# Patient Record
Sex: MALE | Race: WHITE | Employment: OTHER | URBAN - METROPOLITAN AREA
[De-identification: names, ages, dates, MRNs, and addresses within clinical notes are randomized per-mention and may not be internally consistent; named-entity substitution may affect disease eponyms.]

---

## 2017-10-22 ENCOUNTER — HOSPITAL ENCOUNTER (EMERGENCY)
Age: 62
Discharge: HOME | End: 2017-10-22
Attending: EMERGENCY MEDICINE
Payer: MEDICARE

## 2017-10-22 ENCOUNTER — APPOINTMENT (OUTPATIENT)
Dept: GENERAL RADIOLOGY | Age: 62
End: 2017-10-22
Payer: MEDICARE

## 2017-10-22 VITALS
WEIGHT: 250 LBS | TEMPERATURE: 98.1 F | SYSTOLIC BLOOD PRESSURE: 142 MMHG | DIASTOLIC BLOOD PRESSURE: 63 MMHG | BODY MASS INDEX: 35.79 KG/M2 | OXYGEN SATURATION: 95 % | HEIGHT: 70 IN | HEART RATE: 75 BPM | RESPIRATION RATE: 18 BRPM

## 2017-10-22 DIAGNOSIS — L02.511 ABSCESS OF RIGHT INDEX FINGER: Primary | ICD-10-CM

## 2017-10-22 LAB
ANION GAP SERPL CALCULATED.3IONS-SCNC: 19 MEQ/L (ref 7–13)
BASOPHILS ABSOLUTE: 0.3 K/UL (ref 0–0.2)
BASOPHILS RELATIVE PERCENT: 1.2 %
BUN BLDV-MCNC: 19 MG/DL (ref 8–23)
CALCIUM SERPL-MCNC: 9.3 MG/DL (ref 8.6–10.2)
CHLORIDE BLD-SCNC: 98 MEQ/L (ref 98–107)
CO2: 21 MEQ/L (ref 22–29)
CREAT SERPL-MCNC: 1.09 MG/DL (ref 0.7–1.2)
EOSINOPHILS ABSOLUTE: 1.3 K/UL (ref 0–0.7)
EOSINOPHILS RELATIVE PERCENT: 5.7 %
GFR AFRICAN AMERICAN: >60
GFR NON-AFRICAN AMERICAN: >60
GLUCOSE BLD-MCNC: 148 MG/DL (ref 74–109)
HCT VFR BLD CALC: 41.7 % (ref 42–52)
HEMOGLOBIN: 13.6 G/DL (ref 14–18)
LACTIC ACID: 1.5 MMOL/L (ref 0.5–2.2)
LYMPHOCYTES ABSOLUTE: 6.5 K/UL (ref 1–4.8)
LYMPHOCYTES RELATIVE PERCENT: 28.7 %
MCH RBC QN AUTO: 28.4 PG (ref 27–31.3)
MCHC RBC AUTO-ENTMCNC: 32.6 % (ref 33–37)
MCV RBC AUTO: 87.2 FL (ref 80–100)
MONOCYTES ABSOLUTE: 2.6 K/UL (ref 0.2–0.8)
MONOCYTES RELATIVE PERCENT: 11.4 %
NEUTROPHILS ABSOLUTE: 12.1 K/UL (ref 1.4–6.5)
NEUTROPHILS RELATIVE PERCENT: 53 %
PDW BLD-RTO: 14 % (ref 11.5–14.5)
PLATELET # BLD: 274 K/UL (ref 130–400)
POTASSIUM SERPL-SCNC: 4.5 MEQ/L (ref 3.5–5.1)
RBC # BLD: 4.78 M/UL (ref 4.7–6.1)
SODIUM BLD-SCNC: 138 MEQ/L (ref 132–144)
WBC # BLD: 22.8 K/UL (ref 4.8–10.8)

## 2017-10-22 PROCEDURE — 85025 COMPLETE CBC W/AUTO DIFF WBC: CPT

## 2017-10-22 PROCEDURE — 96366 THER/PROPH/DIAG IV INF ADDON: CPT

## 2017-10-22 PROCEDURE — 87070 CULTURE OTHR SPECIMN AEROBIC: CPT

## 2017-10-22 PROCEDURE — 80048 BASIC METABOLIC PNL TOTAL CA: CPT

## 2017-10-22 PROCEDURE — 87077 CULTURE AEROBIC IDENTIFY: CPT

## 2017-10-22 PROCEDURE — 99283 EMERGENCY DEPT VISIT LOW MDM: CPT

## 2017-10-22 PROCEDURE — 87075 CULTR BACTERIA EXCEPT BLOOD: CPT

## 2017-10-22 PROCEDURE — 6360000002 HC RX W HCPCS: Performed by: EMERGENCY MEDICINE

## 2017-10-22 PROCEDURE — 96375 TX/PRO/DX INJ NEW DRUG ADDON: CPT

## 2017-10-22 PROCEDURE — 87186 SC STD MICRODIL/AGAR DIL: CPT

## 2017-10-22 PROCEDURE — 83605 ASSAY OF LACTIC ACID: CPT

## 2017-10-22 PROCEDURE — 87205 SMEAR GRAM STAIN: CPT

## 2017-10-22 PROCEDURE — 87147 CULTURE TYPE IMMUNOLOGIC: CPT

## 2017-10-22 PROCEDURE — 96365 THER/PROPH/DIAG IV INF INIT: CPT

## 2017-10-22 PROCEDURE — 2580000003 HC RX 258: Performed by: EMERGENCY MEDICINE

## 2017-10-22 PROCEDURE — 73140 X-RAY EXAM OF FINGER(S): CPT

## 2017-10-22 PROCEDURE — 87040 BLOOD CULTURE FOR BACTERIA: CPT

## 2017-10-22 RX ORDER — OXYCODONE HYDROCHLORIDE AND ACETAMINOPHEN 5; 325 MG/1; MG/1
1-2 TABLET ORAL EVERY 6 HOURS PRN
Qty: 20 TABLET | Refills: 0 | Status: SHIPPED | OUTPATIENT
Start: 2017-10-22 | End: 2017-10-29

## 2017-10-22 RX ORDER — CLINDAMYCIN HYDROCHLORIDE 150 MG/1
450 CAPSULE ORAL 3 TIMES DAILY
Qty: 90 CAPSULE | Refills: 0 | Status: SHIPPED | OUTPATIENT
Start: 2017-10-22 | End: 2017-11-01

## 2017-10-22 RX ORDER — 0.9 % SODIUM CHLORIDE 0.9 %
1000 INTRAVENOUS SOLUTION INTRAVENOUS ONCE
Status: COMPLETED | OUTPATIENT
Start: 2017-10-22 | End: 2017-10-22

## 2017-10-22 RX ORDER — CHLORHEXIDINE GLUCONATE 4 G/100ML
SOLUTION TOPICAL
Qty: 236 ML | Refills: 0 | Status: SHIPPED | OUTPATIENT
Start: 2017-10-22 | End: 2017-11-05

## 2017-10-22 RX ADMIN — SODIUM CHLORIDE 1000 ML: 9 INJECTION, SOLUTION INTRAVENOUS at 18:29

## 2017-10-22 RX ADMIN — VANCOMYCIN HYDROCHLORIDE 1000 MG: 1 INJECTION, POWDER, LYOPHILIZED, FOR SOLUTION INTRAVENOUS at 18:53

## 2017-10-22 RX ADMIN — HYDROMORPHONE HYDROCHLORIDE 1 MG: 1 INJECTION, SOLUTION INTRAMUSCULAR; INTRAVENOUS; SUBCUTANEOUS at 18:29

## 2017-10-22 ASSESSMENT — PAIN SCALES - GENERAL
PAINLEVEL_OUTOF10: 7
PAINLEVEL_OUTOF10: 0
PAINLEVEL_OUTOF10: 7
PAINLEVEL_OUTOF10: 0

## 2017-10-22 ASSESSMENT — ENCOUNTER SYMPTOMS
EYE PAIN: 0
WHEEZING: 0
BACK PAIN: 0
COUGH: 0
SHORTNESS OF BREATH: 0
VOMITING: 0
DIARRHEA: 0
ABDOMINAL PAIN: 0
CONSTIPATION: 0
APNEA: 0
ABDOMINAL DISTENTION: 0
SINUS PRESSURE: 0
PHOTOPHOBIA: 0
SORE THROAT: 0
COLOR CHANGE: 0
NAUSEA: 0
RHINORRHEA: 0

## 2017-10-22 ASSESSMENT — PAIN DESCRIPTION - ONSET: ONSET: GRADUAL

## 2017-10-22 ASSESSMENT — PAIN DESCRIPTION - PROGRESSION: CLINICAL_PROGRESSION: GRADUALLY WORSENING

## 2017-10-22 ASSESSMENT — PAIN DESCRIPTION - PAIN TYPE: TYPE: ACUTE PAIN

## 2017-10-22 ASSESSMENT — PAIN DESCRIPTION - LOCATION: LOCATION: FINGER (COMMENT WHICH ONE)

## 2017-10-22 ASSESSMENT — PAIN DESCRIPTION - FREQUENCY: FREQUENCY: CONTINUOUS

## 2017-10-22 ASSESSMENT — PAIN DESCRIPTION - DESCRIPTORS: DESCRIPTORS: DULL;ACHING

## 2017-10-22 ASSESSMENT — PAIN DESCRIPTION - ORIENTATION: ORIENTATION: RIGHT;OTHER (COMMENT)

## 2017-10-22 NOTE — ED TRIAGE NOTES
Patient to room #8 for c/o abscess on right hand first finger. Abscess is draining at this time. Dr. Ethel Aranda at bedside.

## 2017-10-22 NOTE — ED PROVIDER NOTES
[address] [place] [address] ED  eMERGENCY dEPARTMENT eNCOUnter      Pt Name: Mike Byrd  MRN: [id number]  [place] 1955  Date of evaluation: 10/22/2017  Provider: Keena Page MD    [address] [place] [address]       Chief Complaint   Patient presents with    Abscess     right first finger       HISTORY OF PRESENT ILLNESS   (Location/Symptom, Timing/Onset, Context/Setting, Quality, Duration, Modifying Factors, Severity)  Note limiting factors. Mike Byrd is a 63 y.o. male who presents to the emergency department with complaint of Right index finger abscess. History of diabetes on insulin. Was seen by family doctor Dr. Gladys Dunlap and started him on Bactrim last week. Abscess has however continued to worsen. Draining purulence. Also worsening pain and swelling. Denies fever or chills. Denies nausea or vomiting. HPI    Nursing Notes were reviewed. REVIEW OF SYSTEMS    (2-9 systems for level 4, 10 or more for level 5)     Review of Systems   Constitutional: Negative. Negative for activity change, appetite change, chills, fatigue and fever. HENT: Negative for congestion, ear discharge, ear pain, hearing loss, rhinorrhea, sinus pressure and sore throat. Eyes: Negative for photophobia, pain and visual disturbance. Respiratory: Negative for apnea, cough, shortness of breath and wheezing. Cardiovascular: Negative for chest pain, palpitations and leg swelling. Gastrointestinal: Negative for abdominal distention, abdominal pain, constipation, diarrhea, nausea and vomiting. Endocrine: Negative for cold intolerance, heat intolerance and polyuria. Genitourinary: Negative for dysuria, flank pain, frequency and urgency. Musculoskeletal: Positive for joint swelling. Negative for arthralgias, back pain, gait problem, myalgias and neck stiffness. Skin: Negative for color change, pallor and rash. Allergic/Immunologic: Negative for food allergies and immunocompromised state.    Neurological: Negative for dizziness, tremors, syncope, weakness, light-headedness and headaches. Psychiatric/Behavioral: Negative for agitation, confusion and hallucinations. All other systems reviewed and are negative. Except as noted above the remainder of the review of systems was reviewed and negative. PAST MEDICAL HISTORY     Past Medical History:   Diagnosis Date    [facility]     Depression     Hodgkin disease [facility]     Hyperlipidemia     Hypertension     MRSA infection 9/2014    Left Groin     Sleep apnea     [facility]     Type II or unspecified type diabetes mellitus without mention of complication, not stated as uncontrolled          SURGICAL HISTORY       Past Surgical History:   Procedure Laterality Date    ABDOMEN SURGERY      hodgkin     CHOLECYSTECTOMY      COLONOSCOPY      COLONOSCOPY N/A 11/22/2016    COLONOSCOPY performed by Cheryl Holt MD at [facility]      umbilical    SPLENECTOMY, TOTAL     [address] [place]       Discharge Medication List as of 10/22/2017  7:01 PM      CONTINUE these medications which have NOT CHANGED    Details   aspirin 81 MG EC tablet Take 81 mg by mouth daily      insulin NPH (HUMULIN N;NOVOLIN N) 100 UNIT/ML injection vial Inject into the skin 2 times daily (before meals)      Dulaglutide (TRULICITY) 9.09 CX/6.4MA SOPN Inject into the skin      clonazePAM (KLONOPIN) 1 MG tablet Take 1 mg by mouth 2 times daily as needed      !! metoprolol (TOPROL-XL) 25 MG XL tablet Historical Med      Ipratropium-Albuterol (COMBIVENT RESPIMAT IN) Inhale  into the lungs. levothyroxine (SYNTHROID) 175 MCG tablet Take 175 mcg by mouth Daily. atorvastatin (LIPITOR) 40 MG tablet Take 40 mg by mouth daily. buPROPion SR (WELLBUTRIN SR) 150 MG SR tablet Take 150 mg by mouth 2 times daily.

## 2017-10-25 LAB
ANAEROBIC CULTURE: ABNORMAL
GRAM STAIN RESULT: ABNORMAL
ORGANISM: ABNORMAL
WOUND/ABSCESS: ABNORMAL

## 2017-10-27 LAB
BLOOD CULTURE, ROUTINE: NORMAL
CULTURE, BLOOD 2: NORMAL

## 2017-11-01 ENCOUNTER — TELEPHONE (OUTPATIENT)
Dept: INFECTIOUS DISEASES | Age: 62
End: 2017-11-01

## 2017-11-01 NOTE — TELEPHONE ENCOUNTER
Lab results of Vanco trough rcvd, Result is 8.4. Per review with Dr Coral Ring, Patient to stay on same dose. Fax Rcvd from [facility] and [facility]. Per [facility], Patient is on 105 IV Vanco Q 24 hours.

## 2017-11-08 ENCOUNTER — TELEPHONE (OUTPATIENT)
Dept: INFECTIOUS DISEASES | Age: 62
End: 2017-11-08

## 2017-11-17 ENCOUNTER — OFFICE VISIT (OUTPATIENT)
Dept: INFECTIOUS DISEASES | Age: 62
End: 2017-11-17

## 2017-11-17 VITALS
TEMPERATURE: 98.6 F | WEIGHT: 269 LBS | BODY MASS INDEX: 38.51 KG/M2 | HEART RATE: 95 BPM | RESPIRATION RATE: 20 BRPM | DIASTOLIC BLOOD PRESSURE: 68 MMHG | SYSTOLIC BLOOD PRESSURE: 124 MMHG | HEIGHT: 70 IN

## 2017-11-17 DIAGNOSIS — Z90.81 H/O SPLENECTOMY: ICD-10-CM

## 2017-11-17 DIAGNOSIS — D72.823 LEUKEMOID REACTION: ICD-10-CM

## 2017-11-17 DIAGNOSIS — L03.90 MRSA CELLULITIS: ICD-10-CM

## 2017-11-17 DIAGNOSIS — L03.011 CELLULITIS OF FINGER OF RIGHT HAND: Primary | ICD-10-CM

## 2017-11-17 DIAGNOSIS — B95.62 MRSA CELLULITIS: ICD-10-CM

## 2017-11-17 PROCEDURE — G8484 FLU IMMUNIZE NO ADMIN: HCPCS | Performed by: INTERNAL MEDICINE

## 2017-11-17 PROCEDURE — 3017F COLORECTAL CA SCREEN DOC REV: CPT | Performed by: INTERNAL MEDICINE

## 2017-11-17 PROCEDURE — G8427 DOCREV CUR MEDS BY ELIG CLIN: HCPCS | Performed by: INTERNAL MEDICINE

## 2017-11-17 PROCEDURE — 1036F TOBACCO NON-USER: CPT | Performed by: INTERNAL MEDICINE

## 2017-11-17 PROCEDURE — G8417 CALC BMI ABV UP PARAM F/U: HCPCS | Performed by: INTERNAL MEDICINE

## 2017-11-17 PROCEDURE — 99214 OFFICE O/P EST MOD 30 MIN: CPT | Performed by: INTERNAL MEDICINE

## 2017-11-17 RX ORDER — DOXYCYCLINE HYCLATE 100 MG
100 TABLET ORAL 2 TIMES DAILY
Qty: 14 TABLET | Refills: 0 | Status: SHIPPED | OUTPATIENT
Start: 2017-11-17 | End: 2017-11-24

## 2017-11-17 NOTE — PROGRESS NOTES
Infectious Disease Progress Note    11/17/2017    Patient is a followup regarding R finger infection with cellulitis - not currently on any abx. Of note he does not have a spleen. ( hodgkins ) and picc is out. Still having some erythema and swelling of the finger. Has concerns about this. No fevers. Appears to be doing ok. Subjectively, no new complaints at this time. General: Patient in no acute distress, cooperative  Skin: no new rashes   HEENT:  EOMI, MMM, Neck is supple  Heart: S1 S2  Lungs: clear bilaterally   Abdomen: soft, ND, +BS, NTTP  Extrem:+pulses, no calf pain, right finger with slight warmth and swelling, mild erythema. Neuro exam: CN II-XII intact    Imaging and labs were reviewed per medical records and any ID pertinent labs were addressed with the patient. Lab Results   Component Value Date    WBC 22.8 (H) 10/22/2017    HGB 13.6 (L) 10/22/2017    HCT 41.7 (L) 10/22/2017    MCV 87.2 10/22/2017     10/22/2017         ASSESSMENT  R index finger cellulitis - subtherapeutic vanco levels and still some leukocytosis  Hx of MRSA  Hx of DM2  Hx of splenectomy  leukomoid reaction    PLAN:    S/p Vanco ( dc'ed 11/11 ) x 2 weeks. Doxy PO x 7 days      Follow up as needed. Time spent with patient: 25 min    >50% of time spent counseling on: Wound must be kept clean. Patient must wash hands prior to and after touching the wound area. Patient has been discouraged from picking at the skin or scratching.         Jen Quintero DO

## 2018-02-02 ENCOUNTER — APPOINTMENT (OUTPATIENT)
Dept: GENERAL RADIOLOGY | Age: 63
DRG: 193 | End: 2018-02-02
Payer: MEDICARE

## 2018-02-02 ENCOUNTER — HOSPITAL ENCOUNTER (INPATIENT)
Age: 63
LOS: 3 days | Discharge: HOME | DRG: 193 | End: 2018-02-05
Attending: EMERGENCY MEDICINE | Admitting: INTERNAL MEDICINE
Payer: MEDICARE

## 2018-02-02 DIAGNOSIS — E87.1 HYPONATREMIA: ICD-10-CM

## 2018-02-02 DIAGNOSIS — R73.9 HYPERGLYCEMIA: ICD-10-CM

## 2018-02-02 DIAGNOSIS — E86.0 DEHYDRATION: ICD-10-CM

## 2018-02-02 DIAGNOSIS — J18.9 PNEUMONIA DUE TO ORGANISM: Primary | ICD-10-CM

## 2018-02-02 LAB
ALBUMIN SERPL-MCNC: 3.8 G/DL (ref 3.9–4.9)
ALP BLD-CCNC: 146 U/L (ref 35–104)
ALT SERPL-CCNC: 30 U/L (ref 0–41)
ANION GAP SERPL CALCULATED.3IONS-SCNC: 18 MEQ/L (ref 7–13)
ANION GAP SERPL CALCULATED.3IONS-SCNC: 19 MEQ/L (ref 7–13)
ANION GAP SERPL CALCULATED.3IONS-SCNC: 20 MEQ/L (ref 7–13)
AST SERPL-CCNC: 21 U/L (ref 0–40)
BANDED NEUTROPHILS RELATIVE PERCENT: 7 %
BASOPHILS ABSOLUTE: 0 K/UL (ref 0–0.2)
BASOPHILS RELATIVE PERCENT: 0.2 %
BILIRUB SERPL-MCNC: 1.4 MG/DL (ref 0–1.2)
BILIRUBIN URINE: NEGATIVE
BLOOD, URINE: NEGATIVE
BUN BLDV-MCNC: 21 MG/DL (ref 8–23)
BUN BLDV-MCNC: 28 MG/DL (ref 8–23)
BUN BLDV-MCNC: 30 MG/DL (ref 8–23)
CALCIUM SERPL-MCNC: 8.8 MG/DL (ref 8.6–10.2)
CALCIUM SERPL-MCNC: 8.9 MG/DL (ref 8.6–10.2)
CALCIUM SERPL-MCNC: 9.1 MG/DL (ref 8.6–10.2)
CHLORIDE BLD-SCNC: 89 MEQ/L (ref 98–107)
CHLORIDE BLD-SCNC: 90 MEQ/L (ref 98–107)
CHLORIDE BLD-SCNC: 91 MEQ/L (ref 98–107)
CLARITY: CLEAR
CO2: 17 MEQ/L (ref 22–29)
CO2: 18 MEQ/L (ref 22–29)
CO2: 20 MEQ/L (ref 22–29)
COLOR: YELLOW
CREAT SERPL-MCNC: 1.28 MG/DL (ref 0.7–1.2)
CREAT SERPL-MCNC: 1.33 MG/DL (ref 0.7–1.2)
CREAT SERPL-MCNC: 1.37 MG/DL (ref 0.7–1.2)
EKG ATRIAL RATE: 95 BPM
EKG P AXIS: 51 DEGREES
EKG P-R INTERVAL: 198 MS
EKG Q-T INTERVAL: 354 MS
EKG QRS DURATION: 100 MS
EKG QTC CALCULATION (BAZETT): 444 MS
EKG R AXIS: 1 DEGREES
EKG T AXIS: 31 DEGREES
EKG VENTRICULAR RATE: 95 BPM
EOSINOPHILS ABSOLUTE: 0.3 K/UL (ref 0–0.7)
EOSINOPHILS RELATIVE PERCENT: 1 %
GFR AFRICAN AMERICAN: >60
GFR NON-AFRICAN AMERICAN: 52.8
GFR NON-AFRICAN AMERICAN: 54.7
GFR NON-AFRICAN AMERICAN: 57.2
GLOBULIN: 3.4 G/DL (ref 2.3–3.5)
GLUCOSE BLD-MCNC: 285 MG/DL (ref 60–115)
GLUCOSE BLD-MCNC: 290 MG/DL (ref 60–115)
GLUCOSE BLD-MCNC: 312 MG/DL (ref 74–109)
GLUCOSE BLD-MCNC: 338 MG/DL (ref 60–115)
GLUCOSE BLD-MCNC: 346 MG/DL (ref 74–109)
GLUCOSE BLD-MCNC: 451 MG/DL (ref 60–115)
GLUCOSE BLD-MCNC: 454 MG/DL (ref 60–115)
GLUCOSE BLD-MCNC: 465 MG/DL (ref 60–115)
GLUCOSE BLD-MCNC: 471 MG/DL (ref 60–115)
GLUCOSE BLD-MCNC: 499 MG/DL (ref 60–115)
GLUCOSE BLD-MCNC: 501 MG/DL (ref 74–109)
GLUCOSE BLD-MCNC: 523 MG/DL (ref 60–115)
GLUCOSE URINE: 500 MG/DL
HBA1C MFR BLD: 10.7 % (ref 4.8–5.9)
HCT VFR BLD CALC: 42.4 % (ref 42–52)
HEMOGLOBIN: 14.3 G/DL (ref 14–18)
KETONES, URINE: ABNORMAL MG/DL
LACTIC ACID: 1.8 MMOL/L (ref 0.5–2.2)
LEUKOCYTE ESTERASE, URINE: NEGATIVE
LIPASE: 23 U/L (ref 13–60)
LYMPHOCYTES ABSOLUTE: 5.9 K/UL (ref 1–4.8)
LYMPHOCYTES RELATIVE PERCENT: 20 %
MAGNESIUM: 2.1 MG/DL (ref 1.7–2.3)
MCH RBC QN AUTO: 28.7 PG (ref 27–31.3)
MCHC RBC AUTO-ENTMCNC: 33.7 % (ref 33–37)
MCV RBC AUTO: 85.2 FL (ref 80–100)
MONOCYTES ABSOLUTE: 1.5 K/UL (ref 0.2–0.8)
MONOCYTES RELATIVE PERCENT: 5 %
NEUTROPHILS ABSOLUTE: 21.8 K/UL (ref 1.4–6.5)
NEUTROPHILS RELATIVE PERCENT: 67 %
NITRITE, URINE: NEGATIVE
PDW BLD-RTO: 14.1 % (ref 11.5–14.5)
PERFORMED ON: ABNORMAL
PH UA: 5 (ref 5–9)
PLATELET # BLD: 264 K/UL (ref 130–400)
POTASSIUM SERPL-SCNC: 4.2 MEQ/L (ref 3.5–5.1)
POTASSIUM SERPL-SCNC: 4.5 MEQ/L (ref 3.5–5.1)
POTASSIUM SERPL-SCNC: 4.8 MEQ/L (ref 3.5–5.1)
PRO-BNP: 114 PG/ML
PROTEIN UA: NEGATIVE MG/DL
RAPID INFLUENZA  B AGN: NEGATIVE
RAPID INFLUENZA A AGN: NEGATIVE
RBC # BLD: 4.98 M/UL (ref 4.7–6.1)
SODIUM BLD-SCNC: 125 MEQ/L (ref 132–144)
SODIUM BLD-SCNC: 128 MEQ/L (ref 132–144)
SODIUM BLD-SCNC: 129 MEQ/L (ref 132–144)
SPECIFIC GRAVITY UA: 1.01 (ref 1–1.03)
TOTAL PROTEIN: 7.2 G/DL (ref 6.4–8.1)
URINE REFLEX TO CULTURE: ABNORMAL
UROBILINOGEN, URINE: 0.2 E.U./DL
WBC # BLD: 29.5 K/UL (ref 4.8–10.8)

## 2018-02-02 PROCEDURE — 97162 PT EVAL MOD COMPLEX 30 MIN: CPT

## 2018-02-02 PROCEDURE — 2700000000 HC OXYGEN THERAPY PER DAY

## 2018-02-02 PROCEDURE — 93005 ELECTROCARDIOGRAM TRACING: CPT

## 2018-02-02 PROCEDURE — 2580000003 HC RX 258: Performed by: INTERNAL MEDICINE

## 2018-02-02 PROCEDURE — 6370000000 HC RX 637 (ALT 250 FOR IP): Performed by: EMERGENCY MEDICINE

## 2018-02-02 PROCEDURE — 1210000000 HC MED SURG R&B

## 2018-02-02 PROCEDURE — 36415 COLL VENOUS BLD VENIPUNCTURE: CPT

## 2018-02-02 PROCEDURE — G8979 MOBILITY GOAL STATUS: HCPCS

## 2018-02-02 PROCEDURE — 96365 THER/PROPH/DIAG IV INF INIT: CPT

## 2018-02-02 PROCEDURE — 2500000003 HC RX 250 WO HCPCS: Performed by: INTERNAL MEDICINE

## 2018-02-02 PROCEDURE — 2580000003 HC RX 258: Performed by: EMERGENCY MEDICINE

## 2018-02-02 PROCEDURE — 83690 ASSAY OF LIPASE: CPT

## 2018-02-02 PROCEDURE — 87449 NOS EACH ORGANISM AG IA: CPT

## 2018-02-02 PROCEDURE — 6370000000 HC RX 637 (ALT 250 FOR IP): Performed by: INTERNAL MEDICINE

## 2018-02-02 PROCEDURE — 99285 EMERGENCY DEPT VISIT HI MDM: CPT

## 2018-02-02 PROCEDURE — 83880 ASSAY OF NATRIURETIC PEPTIDE: CPT

## 2018-02-02 PROCEDURE — 86403 PARTICLE AGGLUT ANTBDY SCRN: CPT

## 2018-02-02 PROCEDURE — 6360000002 HC RX W HCPCS: Performed by: EMERGENCY MEDICINE

## 2018-02-02 PROCEDURE — 81003 URINALYSIS AUTO W/O SCOPE: CPT

## 2018-02-02 PROCEDURE — 80053 COMPREHEN METABOLIC PANEL: CPT

## 2018-02-02 PROCEDURE — 97166 OT EVAL MOD COMPLEX 45 MIN: CPT

## 2018-02-02 PROCEDURE — 83605 ASSAY OF LACTIC ACID: CPT

## 2018-02-02 PROCEDURE — 96375 TX/PRO/DX INJ NEW DRUG ADDON: CPT

## 2018-02-02 PROCEDURE — 94761 N-INVAS EAR/PLS OXIMETRY MLT: CPT

## 2018-02-02 PROCEDURE — 94640 AIRWAY INHALATION TREATMENT: CPT

## 2018-02-02 PROCEDURE — 83735 ASSAY OF MAGNESIUM: CPT

## 2018-02-02 PROCEDURE — 85025 COMPLETE CBC W/AUTO DIFF WBC: CPT

## 2018-02-02 PROCEDURE — 71046 X-RAY EXAM CHEST 2 VIEWS: CPT

## 2018-02-02 PROCEDURE — G8994 SUB PT/OT GOAL STATUS: HCPCS

## 2018-02-02 PROCEDURE — G8993 SUB PT/OT CURRENT STATUS: HCPCS

## 2018-02-02 PROCEDURE — 83036 HEMOGLOBIN GLYCOSYLATED A1C: CPT

## 2018-02-02 PROCEDURE — G8978 MOBILITY CURRENT STATUS: HCPCS

## 2018-02-02 PROCEDURE — 87040 BLOOD CULTURE FOR BACTERIA: CPT

## 2018-02-02 PROCEDURE — 6360000002 HC RX W HCPCS: Performed by: INTERNAL MEDICINE

## 2018-02-02 RX ORDER — LEVOFLOXACIN 5 MG/ML
750 INJECTION, SOLUTION INTRAVENOUS EVERY 24 HOURS
Status: DISCONTINUED | OUTPATIENT
Start: 2018-02-02 | End: 2018-02-05 | Stop reason: HOSPADM

## 2018-02-02 RX ORDER — IPRATROPIUM BROMIDE AND ALBUTEROL SULFATE 2.5; .5 MG/3ML; MG/3ML
1 SOLUTION RESPIRATORY (INHALATION)
Status: DISCONTINUED | OUTPATIENT
Start: 2018-02-02 | End: 2018-02-05 | Stop reason: HOSPADM

## 2018-02-02 RX ORDER — NICOTINE POLACRILEX 4 MG
15 LOZENGE BUCCAL PRN
Status: DISCONTINUED | OUTPATIENT
Start: 2018-02-02 | End: 2018-02-02 | Stop reason: SDUPTHER

## 2018-02-02 RX ORDER — METHYLPREDNISOLONE SODIUM SUCCINATE 125 MG/2ML
125 INJECTION, POWDER, LYOPHILIZED, FOR SOLUTION INTRAMUSCULAR; INTRAVENOUS ONCE
Status: COMPLETED | OUTPATIENT
Start: 2018-02-02 | End: 2018-02-02

## 2018-02-02 RX ORDER — LEVOFLOXACIN 5 MG/ML
750 INJECTION, SOLUTION INTRAVENOUS ONCE
Status: COMPLETED | OUTPATIENT
Start: 2018-02-02 | End: 2018-02-02

## 2018-02-02 RX ORDER — PHENTERMINE HYDROCHLORIDE 37.5 MG/1
37.5 TABLET ORAL
Status: DISCONTINUED | OUTPATIENT
Start: 2018-02-03 | End: 2018-02-02

## 2018-02-02 RX ORDER — SODIUM CHLORIDE 0.9 % (FLUSH) 0.9 %
10 SYRINGE (ML) INJECTION PRN
Status: DISCONTINUED | OUTPATIENT
Start: 2018-02-02 | End: 2018-02-05 | Stop reason: HOSPADM

## 2018-02-02 RX ORDER — DEXTROSE AND SODIUM CHLORIDE 5; .9 G/100ML; G/100ML
INJECTION, SOLUTION INTRAVENOUS PRN
Status: DISCONTINUED | OUTPATIENT
Start: 2018-02-02 | End: 2018-02-05 | Stop reason: HOSPADM

## 2018-02-02 RX ORDER — LISINOPRIL 10 MG/1
10 TABLET ORAL DAILY
Status: DISCONTINUED | OUTPATIENT
Start: 2018-02-02 | End: 2018-02-02

## 2018-02-02 RX ORDER — AMLODIPINE BESYLATE AND ATORVASTATIN CALCIUM 10; 20 MG/1; MG/1
1 TABLET, FILM COATED ORAL DAILY
Status: DISCONTINUED | OUTPATIENT
Start: 2018-02-02 | End: 2018-02-02

## 2018-02-02 RX ORDER — 0.9 % SODIUM CHLORIDE 0.9 %
1000 INTRAVENOUS SOLUTION INTRAVENOUS ONCE
Status: COMPLETED | OUTPATIENT
Start: 2018-02-02 | End: 2018-02-02

## 2018-02-02 RX ORDER — AMLODIPINE BESYLATE 10 MG/1
10 TABLET ORAL DAILY
Status: DISCONTINUED | OUTPATIENT
Start: 2018-02-02 | End: 2018-02-02

## 2018-02-02 RX ORDER — BISACODYL 10 MG
10 SUPPOSITORY, RECTAL RECTAL DAILY PRN
Status: DISCONTINUED | OUTPATIENT
Start: 2018-02-02 | End: 2018-02-05 | Stop reason: HOSPADM

## 2018-02-02 RX ORDER — BUPROPION HYDROCHLORIDE 150 MG/1
150 TABLET, EXTENDED RELEASE ORAL 2 TIMES DAILY
Status: DISCONTINUED | OUTPATIENT
Start: 2018-02-02 | End: 2018-02-05 | Stop reason: HOSPADM

## 2018-02-02 RX ORDER — INSULIN GLARGINE 100 [IU]/ML
30 INJECTION, SOLUTION SUBCUTANEOUS ONCE
Status: COMPLETED | OUTPATIENT
Start: 2018-02-02 | End: 2018-02-02

## 2018-02-02 RX ORDER — CLONAZEPAM 0.5 MG/1
1 TABLET ORAL 2 TIMES DAILY PRN
Status: DISCONTINUED | OUTPATIENT
Start: 2018-02-02 | End: 2018-02-05 | Stop reason: HOSPADM

## 2018-02-02 RX ORDER — IPRATROPIUM BROMIDE AND ALBUTEROL SULFATE 2.5; .5 MG/3ML; MG/3ML
1 SOLUTION RESPIRATORY (INHALATION)
Status: DISCONTINUED | OUTPATIENT
Start: 2018-02-02 | End: 2018-02-02

## 2018-02-02 RX ORDER — IPRATROPIUM BROMIDE AND ALBUTEROL SULFATE 2.5; .5 MG/3ML; MG/3ML
1 SOLUTION RESPIRATORY (INHALATION) ONCE
Status: COMPLETED | OUTPATIENT
Start: 2018-02-02 | End: 2018-02-02

## 2018-02-02 RX ORDER — DEXTROSE MONOHYDRATE 25 G/50ML
12.5 INJECTION, SOLUTION INTRAVENOUS PRN
Status: DISCONTINUED | OUTPATIENT
Start: 2018-02-02 | End: 2018-02-02 | Stop reason: SDUPTHER

## 2018-02-02 RX ORDER — ATORVASTATIN CALCIUM 10 MG/1
20 TABLET, FILM COATED ORAL DAILY
Status: DISCONTINUED | OUTPATIENT
Start: 2018-02-02 | End: 2018-02-05 | Stop reason: HOSPADM

## 2018-02-02 RX ORDER — SODIUM CHLORIDE 9 MG/ML
INJECTION, SOLUTION INTRAVENOUS
Status: DISCONTINUED
Start: 2018-02-02 | End: 2018-02-03

## 2018-02-02 RX ORDER — METHYLPREDNISOLONE SODIUM SUCCINATE 40 MG/ML
40 INJECTION, POWDER, LYOPHILIZED, FOR SOLUTION INTRAMUSCULAR; INTRAVENOUS EVERY 8 HOURS
Status: DISCONTINUED | OUTPATIENT
Start: 2018-02-02 | End: 2018-02-03

## 2018-02-02 RX ORDER — ACETAMINOPHEN 325 MG/1
650 TABLET ORAL EVERY 4 HOURS PRN
Status: DISCONTINUED | OUTPATIENT
Start: 2018-02-02 | End: 2018-02-05 | Stop reason: HOSPADM

## 2018-02-02 RX ORDER — ASPIRIN 81 MG/1
81 TABLET ORAL DAILY
Status: DISCONTINUED | OUTPATIENT
Start: 2018-02-02 | End: 2018-02-05 | Stop reason: HOSPADM

## 2018-02-02 RX ORDER — DOCUSATE SODIUM 100 MG/1
100 CAPSULE, LIQUID FILLED ORAL 2 TIMES DAILY
Status: DISCONTINUED | OUTPATIENT
Start: 2018-02-02 | End: 2018-02-05 | Stop reason: HOSPADM

## 2018-02-02 RX ORDER — NICOTINE POLACRILEX 4 MG
15 LOZENGE BUCCAL PRN
Status: DISCONTINUED | OUTPATIENT
Start: 2018-02-02 | End: 2018-02-05 | Stop reason: HOSPADM

## 2018-02-02 RX ORDER — PANTOPRAZOLE SODIUM 20 MG/1
20 TABLET, DELAYED RELEASE ORAL DAILY
Status: DISCONTINUED | OUTPATIENT
Start: 2018-02-02 | End: 2018-02-02

## 2018-02-02 RX ORDER — SODIUM CHLORIDE 0.9 % (FLUSH) 0.9 %
10 SYRINGE (ML) INJECTION EVERY 12 HOURS SCHEDULED
Status: DISCONTINUED | OUTPATIENT
Start: 2018-02-02 | End: 2018-02-05 | Stop reason: HOSPADM

## 2018-02-02 RX ORDER — DEXTROSE MONOHYDRATE 50 MG/ML
100 INJECTION, SOLUTION INTRAVENOUS PRN
Status: DISCONTINUED | OUTPATIENT
Start: 2018-02-02 | End: 2018-02-05 | Stop reason: HOSPADM

## 2018-02-02 RX ORDER — DEXTROSE MONOHYDRATE 50 MG/ML
100 INJECTION, SOLUTION INTRAVENOUS PRN
Status: DISCONTINUED | OUTPATIENT
Start: 2018-02-02 | End: 2018-02-02 | Stop reason: SDUPTHER

## 2018-02-02 RX ORDER — DEXTROSE MONOHYDRATE 25 G/50ML
12.5 INJECTION, SOLUTION INTRAVENOUS PRN
Status: DISCONTINUED | OUTPATIENT
Start: 2018-02-02 | End: 2018-02-05 | Stop reason: HOSPADM

## 2018-02-02 RX ORDER — METOPROLOL SUCCINATE 25 MG/1
25 TABLET, EXTENDED RELEASE ORAL DAILY
Status: DISCONTINUED | OUTPATIENT
Start: 2018-02-02 | End: 2018-02-05 | Stop reason: HOSPADM

## 2018-02-02 RX ORDER — ONDANSETRON 2 MG/ML
4 INJECTION INTRAMUSCULAR; INTRAVENOUS EVERY 6 HOURS PRN
Status: DISCONTINUED | OUTPATIENT
Start: 2018-02-02 | End: 2018-02-05 | Stop reason: HOSPADM

## 2018-02-02 RX ADMIN — LEVOTHYROXINE SODIUM 175 MCG: 75 TABLET ORAL at 21:37

## 2018-02-02 RX ADMIN — IPRATROPIUM BROMIDE AND ALBUTEROL SULFATE 1 AMPULE: .5; 3 SOLUTION RESPIRATORY (INHALATION) at 18:05

## 2018-02-02 RX ADMIN — ATORVASTATIN CALCIUM 20 MG: 10 TABLET, FILM COATED ORAL at 21:37

## 2018-02-02 RX ADMIN — SODIUM CHLORIDE 12.15 UNITS/HR: 9 INJECTION, SOLUTION INTRAVENOUS at 18:39

## 2018-02-02 RX ADMIN — METHYLPREDNISOLONE SODIUM SUCCINATE 40 MG: 40 INJECTION, POWDER, FOR SOLUTION INTRAMUSCULAR; INTRAVENOUS at 19:30

## 2018-02-02 RX ADMIN — IPRATROPIUM BROMIDE AND ALBUTEROL SULFATE 1 AMPULE: .5; 3 SOLUTION RESPIRATORY (INHALATION) at 10:42

## 2018-02-02 RX ADMIN — SODIUM BICARBONATE: 84 INJECTION, SOLUTION INTRAVENOUS at 17:04

## 2018-02-02 RX ADMIN — SODIUM CHLORIDE 1000 ML: 9 INJECTION, SOLUTION INTRAVENOUS at 10:42

## 2018-02-02 RX ADMIN — INSULIN GLARGINE 30 UNITS: 100 INJECTION, SOLUTION SUBCUTANEOUS at 16:31

## 2018-02-02 RX ADMIN — IPRATROPIUM BROMIDE AND ALBUTEROL SULFATE 1 AMPULE: .5; 3 SOLUTION RESPIRATORY (INHALATION) at 21:49

## 2018-02-02 RX ADMIN — IPRATROPIUM BROMIDE AND ALBUTEROL SULFATE 1 AMPULE: .5; 3 SOLUTION RESPIRATORY (INHALATION) at 13:55

## 2018-02-02 RX ADMIN — BUPROPION HYDROCHLORIDE 150 MG: 150 TABLET, EXTENDED RELEASE ORAL at 21:37

## 2018-02-02 RX ADMIN — METHYLPREDNISOLONE SODIUM SUCCINATE 125 MG: 125 INJECTION, POWDER, FOR SOLUTION INTRAMUSCULAR; INTRAVENOUS at 10:42

## 2018-02-02 RX ADMIN — METFORMIN HYDROCHLORIDE 500 MG: 500 TABLET, FILM COATED ORAL at 16:31

## 2018-02-02 RX ADMIN — PANTOPRAZOLE SODIUM 20 MG: 20 TABLET, DELAYED RELEASE ORAL at 14:28

## 2018-02-02 RX ADMIN — LEVOFLOXACIN 750 MG: 5 INJECTION, SOLUTION INTRAVENOUS at 11:47

## 2018-02-02 ASSESSMENT — ENCOUNTER SYMPTOMS
ABDOMINAL DISTENTION: 0
WHEEZING: 0
PHOTOPHOBIA: 0
BACK PAIN: 0
SHORTNESS OF BREATH: 1
VOMITING: 0
SINUS PRESSURE: 0
COLOR CHANGE: 0
ABDOMINAL PAIN: 0
RHINORRHEA: 0
CHEST TIGHTNESS: 1
DIARRHEA: 0
SORE THROAT: 0
CONSTIPATION: 0
NAUSEA: 0
APNEA: 0
COUGH: 1
EYE PAIN: 0

## 2018-02-02 ASSESSMENT — PAIN SCALES - GENERAL: PAINLEVEL_OUTOF10: 0

## 2018-02-02 NOTE — ED NOTES
Paged Dr. Lance St at 11:36, he returned call at 11:43. Dr. Dejuan Rob decision to admit 11:46. Supervisor called 11:47, bed 210.   Brian Ellis  02/02/18 [id number]

## 2018-02-02 NOTE — PROGRESS NOTES
Physical Therapy    Facility/Department: [facility] MED SURG UNIT  Initial Assessment    NAME: Pricila Segovia  : 1955  MRN: [id number]    Date of Service: 2018    Patient Diagnosis(es): The primary encounter diagnosis was Pneumonia due to organism. Diagnoses of Hyperglycemia, Hyponatremia, and Dehydration were also pertinent to this visit. has a past medical history of COPD (chronic obstructive pulmonary disease) ([facility] [address] Depression; Hodgkin disease ([facility] Hyperlipidemia; Hypertension; MRSA infection; Sleep apnea; Thyroid cancer ([facility] [address] and Type II or unspecified type diabetes mellitus without mention of complication, not stated as uncontrolled. has a past surgical history that includes Total Thyroidectomy; Cholecystectomy; Splenectomy, total; Tooth Extraction; Colonoscopy; Endoscopy, colon, diagnostic; hernia repair; Abdomen surgery; and Colonoscopy (N/A, 2016).     Restrictions  Restrictions/Precautions  Restrictions/Precautions:  (MRSA R hand, 1st digit in )  Required Braces or Orthoses?: Yes  Implants present? : Metal implants (surgical clamps abdominal region)  Position Activity Restriction  Other position/activity restrictions: 2L 02 via NC  Vision/Hearing        Subjective  General  Chart Reviewed: Yes  Patient assessed for rehabilitation services?: Yes  Additional Pertinent Hx: MRSA in index finger on right hand in 2018  Family / Caregiver Present: No  Referring Practitioner: Dr Richard Gipson  Referral Date : 18  Diagnosis: pneumonia  Subjective  Subjective: Sick for about 3 days  Pain Screening  Patient Currently in Pain: No  Vital Signs  Patient Currently in Pain: No       Orientation  Orientation  Overall Orientation Status: Within Functional Limits    Social/Functional History  Social/Functional History  Lives With: Significant other (girlfriend)  Type of Home: House  Home Layout: One level (basement, doesn't use)  Home Access: Stairs to enter without rails  Entrance Stairs - Number of Steps: 2  Bathroom Shower/Tub: Tub/Shower unit  Bathroom Toilet: Standard  Bathroom Equipment:  (none)  Home Equipment:  (None)  ADL Assistance: Independent  Homemaking Assistance: Independent  Homemaking Responsibilities: Yes  Ambulation Assistance: Independent  Transfer Assistance: Independent  Active : Yes  Mode of Transportation: Car  Occupation: Retired  Type of occupation: retired  Leisure & Hobbies: hunting (reports he gave up hunting when it became too hard), golfing, pet care: 2 hunting beagles and a cat  IADL Comments: pet care- 2 beagles (outside pets) and a cat  Objective     Observation/Palpation  Observation: 2L of O2 via NC, mild SOB    AROM RLE (degrees)  RLE AROM: WFL  AROM LLE (degrees)  LLE AROM : WFL  AROM RUE (degrees)  RUE AROM : WFL  AROM LUE (degrees)  LUE AROM : WFL  Strength RLE  Comment: grossly 4+/5 hip, knee, and ankle  Strength LLE  Comment: grossly 4+/5 hip, knee and ankle  Strength RUE  Comment: see OT eval  Strength LUE  Comment: see OT eval        Bed mobility  Rolling to Left: Independent  Rolling to Right: Independent  Supine to Sit: Independent  Sit to Supine: Independent  Transfers  Sit to Stand: Independent  Stand to sit:  Independent  Bed to Chair: Independent  Comment: maneuvers IV pole safely  Ambulation  Ambulation?: Yes  WB Status:  (CONTACT MRSA only cleared ambulation in room)  Ambulation 1  Surface: level tile  Device: No Device  Assistance: Modified Independent  Quality of Gait: wide MISSY small steps as only cleared to ambulate in room with or without IV pole, will call for assist as needed   Distance: 30ft  Comments: IV LUE, O2 at 2L  Stairs/Curb  Stairs?:  (NT as CONTACT can't leave pts room)     Balance  Sitting - Static: Good  Sitting - Dynamic: Good  Standing - Static: Good  Standing - Dynamic: Good  Exercises  Comments: verbally reviewed ankle pumps for circulation and to try to get up and walk every hour while awake     Assessment   Body structures, Functions, Activity limitations: Decreased functional mobility ; Decreased endurance  Assessment: 62yom with new onset pneumonia, history of COPD and noted decreased in fucntional mobility endurance appropriate for PT follow for endurance and strengthening and increase recovery  Treatment Diagnosis: decreased endurance for functional mobility  Decision Making: Low Complexity  Activity Tolerance  Activity Tolerance: Patient Tolerated treatment well;Patient limited by endurance (limited by breathing)     Discharge Recommendations:   home with assist PRN      Plan   Plan  Times per week:  (5-7x/week)  Times per day:  (1-2x/day)  Plan weeks: <1  Current Treatment Recommendations: Strengthening, Endurance Training, Functional Mobility Training    G-Code  PT G-Codes  Functional Assessment Tool Used: per therapy evaluation, pt was able to walk [employer] with cart pus normally  Score: approx 75% impairment only 30ft of ambulation  Functional Limitation: Mobility: Walking and moving around  Mobility: Walking and Moving Around Current Status (): At least 60 percent but less than 80 percent impaired, limited or restricted  Mobility: Walking and Moving Around Goal Status ():  At least 1 percent but less than 20 percent impaired, limited or restricted          Goals  Short term goals  Time Frame for Short term goals: 3-5 days  Short term goal 1: tolerate >= 150ft of walking before need to rest without AD or with IV pole  Short term goal 2: Curb step modified independent  Short term goal 3: tolerate 2x10 LE JENNIFER with rest periods  Short term goal 4: HEP in place for dischare  Long term goals  Time Frame for Long term goals : same as STg medical patient  Patient Goals   Patient goals : breathe better and go home       Therapy Time   Individual Concurrent Group Co-treatment   Time In  200         Time Out  230         Minutes  [id number] [place], [id number]

## 2018-02-02 NOTE — ED NOTES
Patient presents to the ED with complaint of shortness of breath and not feeling well for the past few days. Patient alert oriented. Patient has history of COPD and uses a CPap at home. Nursing Adult Assessment    General Appearance  [x] Facial Expressions, extremities, & body posture are relaxed. [] Exceptions:    Cognitive  [x] Alert, make eye contact when prompted. [x] Oriented to person, place, & situation. [] Exceptions:    Respiratory  [x] Unlabored breathing   [] Speaks in clear and complete sentences   [x] Chest expansion is symmetrical with breaths   [] Breath sounds are clear bilaterally. [x] Exceptions: faint exp wheezing noted right posterior field. Cardiovascular  [x] Regular apical heart sounds   [x] Peripheral pulses are palpable   [x] Capillary refill < 3 seconds in all extremities. [] Exceptions:    Abdomen  [x] Non-tender   [x] Non-distended   [x] Bowel sounds present. [] Exceptions:    Skin  [x] Color appropriate for ethnicity   [x] No rash or discoloration present at the area(s) of complaint   [x] Warm and dry to touch. [] Exceptions:    Extremities  [x] Non-tender   [x] Normal range of motion   [x] Normal sensation   [x] Normal Appearance, No Edema.   [x] Exceptions:      Ok Whittkaer RN  02/02/18 9738

## 2018-02-02 NOTE — ED PROVIDER NOTES
present. Cardiovascular: Normal rate, regular rhythm, normal heart sounds and intact distal pulses. Exam reveals no gallop and no friction rub. No murmur heard. Pulmonary/Chest: Effort normal and breath sounds normal. No stridor. No respiratory distress. He has no wheezes. He has no rales. He exhibits no tenderness. Abdominal: Soft. Bowel sounds are normal. He exhibits no distension and no mass. There is no tenderness. There is no rebound and no guarding. Musculoskeletal: Normal range of motion. He exhibits no edema, tenderness or deformity. Lymphadenopathy:     He has no cervical adenopathy. Neurological: He is alert and oriented to person, place, and time. He has normal reflexes. No cranial nerve deficit. He exhibits normal muscle tone. Coordination normal.   Skin: Skin is warm and dry. No rash noted. He is not diaphoretic. No erythema. No pallor. Psychiatric: He has a normal mood and affect. His behavior is normal. Judgment and thought content normal.   Nursing note and vitals reviewed. DIAGNOSTIC RESULTS     EKG: All EKG's are interpreted by the Emergency Department Physician who either signs or Co-signs this chart in the absence of a cardiologist.    Twelve-lead EKG shows normal sinus rhythm, rate 95 beats a minute, normal intervals, normal electrical axis, no acute ST-T wave changes. RADIOLOGY:   Non-plain film images such as CT, Ultrasound and MRI are read by the radiologist. Plain radiographic images are visualized and preliminarily interpreted by the emergency physician with the below findings:    Chest x-ray showed no acute cardiopulmonary process.     Interpretation per the Radiologist below, if available at the time of this note:    XR CHEST STANDARD (2 VW)    (Results Pending)         ED BEDSIDE ULTRASOUND:   Performed by ED Physician - none    LABS:  Labs Reviewed   COMPREHENSIVE METABOLIC PANEL - Abnormal; Notable for the following:        Result Value    Sodium 128 (*)

## 2018-02-03 PROBLEM — E11.10 DKA, TYPE 2, NOT AT GOAL (HCC): Status: ACTIVE | Noted: 2018-02-03

## 2018-02-03 LAB
ANION GAP SERPL CALCULATED.3IONS-SCNC: 16 MEQ/L (ref 7–13)
ANION GAP SERPL CALCULATED.3IONS-SCNC: 19 MEQ/L (ref 7–13)
ANION GAP SERPL CALCULATED.3IONS-SCNC: 20 MEQ/L (ref 7–13)
BANDED NEUTROPHILS RELATIVE PERCENT: 15 %
BASOPHILS ABSOLUTE: 0 K/UL (ref 0–0.2)
BASOPHILS RELATIVE PERCENT: 0 %
BUN BLDV-MCNC: 32 MG/DL (ref 8–23)
BUN BLDV-MCNC: 36 MG/DL (ref 8–23)
BUN BLDV-MCNC: 39 MG/DL (ref 8–23)
CALCIUM SERPL-MCNC: 8.6 MG/DL (ref 8.6–10.2)
CALCIUM SERPL-MCNC: 8.7 MG/DL (ref 8.6–10.2)
CALCIUM SERPL-MCNC: 8.7 MG/DL (ref 8.6–10.2)
CHLORIDE BLD-SCNC: 88 MEQ/L (ref 98–107)
CHLORIDE BLD-SCNC: 90 MEQ/L (ref 98–107)
CHLORIDE BLD-SCNC: 93 MEQ/L (ref 98–107)
CO2: 20 MEQ/L (ref 22–29)
CO2: 21 MEQ/L (ref 22–29)
CO2: 22 MEQ/L (ref 22–29)
CREAT SERPL-MCNC: 1.44 MG/DL (ref 0.7–1.2)
CREAT SERPL-MCNC: 1.72 MG/DL (ref 0.7–1.2)
CREAT SERPL-MCNC: 1.88 MG/DL (ref 0.7–1.2)
EOSINOPHILS ABSOLUTE: 0 K/UL (ref 0–0.7)
EOSINOPHILS RELATIVE PERCENT: 0 %
GFR AFRICAN AMERICAN: 44.4
GFR AFRICAN AMERICAN: 49.2
GFR AFRICAN AMERICAN: >60
GFR NON-AFRICAN AMERICAN: 36.7
GFR NON-AFRICAN AMERICAN: 40.6
GFR NON-AFRICAN AMERICAN: 49.9
GLUCOSE BLD-MCNC: 237 MG/DL (ref 60–115)
GLUCOSE BLD-MCNC: 240 MG/DL (ref 60–115)
GLUCOSE BLD-MCNC: 251 MG/DL (ref 60–115)
GLUCOSE BLD-MCNC: 252 MG/DL (ref 60–115)
GLUCOSE BLD-MCNC: 259 MG/DL (ref 60–115)
GLUCOSE BLD-MCNC: 270 MG/DL (ref 74–109)
GLUCOSE BLD-MCNC: 271 MG/DL (ref 60–115)
GLUCOSE BLD-MCNC: 272 MG/DL (ref 74–109)
GLUCOSE BLD-MCNC: 277 MG/DL (ref 60–115)
GLUCOSE BLD-MCNC: 277 MG/DL (ref 60–115)
GLUCOSE BLD-MCNC: 279 MG/DL (ref 60–115)
GLUCOSE BLD-MCNC: 291 MG/DL (ref 60–115)
GLUCOSE BLD-MCNC: 296 MG/DL (ref 60–115)
GLUCOSE BLD-MCNC: 319 MG/DL (ref 60–115)
GLUCOSE BLD-MCNC: 329 MG/DL (ref 60–115)
GLUCOSE BLD-MCNC: 400 MG/DL (ref 60–115)
GLUCOSE BLD-MCNC: 439 MG/DL (ref 60–115)
GLUCOSE BLD-MCNC: 458 MG/DL (ref 60–115)
GLUCOSE BLD-MCNC: 495 MG/DL (ref 74–109)
GLUCOSE BLD-MCNC: 512 MG/DL (ref 60–115)
GLUCOSE BLD-MCNC: 515 MG/DL (ref 60–115)
HCT VFR BLD CALC: 37.7 % (ref 42–52)
HEMOGLOBIN: 12.5 G/DL (ref 14–18)
LYMPHOCYTES ABSOLUTE: 1.6 K/UL (ref 1–4.8)
LYMPHOCYTES RELATIVE PERCENT: 5 %
MCH RBC QN AUTO: 28.4 PG (ref 27–31.3)
MCHC RBC AUTO-ENTMCNC: 33 % (ref 33–37)
MCV RBC AUTO: 85.8 FL (ref 80–100)
MONOCYTES ABSOLUTE: 1.3 K/UL (ref 0.2–0.8)
MONOCYTES RELATIVE PERCENT: 4 %
NEUTROPHILS ABSOLUTE: 29.7 K/UL (ref 1.4–6.5)
NEUTROPHILS RELATIVE PERCENT: 76 %
PDW BLD-RTO: 14.6 % (ref 11.5–14.5)
PERFORMED ON: ABNORMAL
PLATELET # BLD: 297 K/UL (ref 130–400)
POTASSIUM SERPL-SCNC: 4.2 MEQ/L (ref 3.5–5.1)
POTASSIUM SERPL-SCNC: 4.4 MEQ/L (ref 3.5–5.1)
POTASSIUM SERPL-SCNC: 4.4 MEQ/L (ref 3.5–5.1)
RBC # BLD: 4.4 M/UL (ref 4.7–6.1)
RBC # BLD: NORMAL 10*6/UL
SODIUM BLD-SCNC: 127 MEQ/L (ref 132–144)
SODIUM BLD-SCNC: 130 MEQ/L (ref 132–144)
SODIUM BLD-SCNC: 132 MEQ/L (ref 132–144)
WBC # BLD: 32.6 K/UL (ref 4.8–10.8)

## 2018-02-03 PROCEDURE — 6370000000 HC RX 637 (ALT 250 FOR IP): Performed by: INTERNAL MEDICINE

## 2018-02-03 PROCEDURE — 2700000000 HC OXYGEN THERAPY PER DAY

## 2018-02-03 PROCEDURE — 1210000000 HC MED SURG R&B

## 2018-02-03 PROCEDURE — 97110 THERAPEUTIC EXERCISES: CPT

## 2018-02-03 PROCEDURE — 94640 AIRWAY INHALATION TREATMENT: CPT

## 2018-02-03 PROCEDURE — 97116 GAIT TRAINING THERAPY: CPT

## 2018-02-03 PROCEDURE — 2580000003 HC RX 258: Performed by: INTERNAL MEDICINE

## 2018-02-03 PROCEDURE — 6360000002 HC RX W HCPCS: Performed by: INTERNAL MEDICINE

## 2018-02-03 PROCEDURE — 85025 COMPLETE CBC W/AUTO DIFF WBC: CPT

## 2018-02-03 PROCEDURE — 36415 COLL VENOUS BLD VENIPUNCTURE: CPT

## 2018-02-03 PROCEDURE — 80048 BASIC METABOLIC PNL TOTAL CA: CPT

## 2018-02-03 PROCEDURE — 2500000003 HC RX 250 WO HCPCS: Performed by: INTERNAL MEDICINE

## 2018-02-03 RX ORDER — INSULIN GLARGINE 100 [IU]/ML
35 INJECTION, SOLUTION SUBCUTANEOUS DAILY
Status: DISCONTINUED | OUTPATIENT
Start: 2018-02-03 | End: 2018-02-03

## 2018-02-03 RX ORDER — PREDNISONE 10 MG/1
10 TABLET ORAL 2 TIMES DAILY
Status: DISCONTINUED | OUTPATIENT
Start: 2018-02-04 | End: 2018-02-04

## 2018-02-03 RX ORDER — PREDNISONE 20 MG/1
20 TABLET ORAL 2 TIMES DAILY
Status: DISCONTINUED | OUTPATIENT
Start: 2018-02-03 | End: 2018-02-03

## 2018-02-03 RX ORDER — SODIUM CHLORIDE 9 MG/ML
INJECTION, SOLUTION INTRAVENOUS
Status: DISCONTINUED
Start: 2018-02-03 | End: 2018-02-03

## 2018-02-03 RX ORDER — DEXTROSE MONOHYDRATE 50 MG/ML
100 INJECTION, SOLUTION INTRAVENOUS PRN
Status: DISCONTINUED | OUTPATIENT
Start: 2018-02-03 | End: 2018-02-05 | Stop reason: HOSPADM

## 2018-02-03 RX ORDER — DEXTROSE MONOHYDRATE 25 G/50ML
12.5 INJECTION, SOLUTION INTRAVENOUS PRN
Status: DISCONTINUED | OUTPATIENT
Start: 2018-02-03 | End: 2018-02-05 | Stop reason: HOSPADM

## 2018-02-03 RX ORDER — NICOTINE POLACRILEX 4 MG
15 LOZENGE BUCCAL PRN
Status: DISCONTINUED | OUTPATIENT
Start: 2018-02-03 | End: 2018-02-05 | Stop reason: HOSPADM

## 2018-02-03 RX ORDER — DEXTROSE AND SODIUM CHLORIDE 5; .9 G/100ML; G/100ML
125 INJECTION, SOLUTION INTRAVENOUS CONTINUOUS
Status: DISCONTINUED | OUTPATIENT
Start: 2018-02-03 | End: 2018-02-04

## 2018-02-03 RX ORDER — INSULIN GLARGINE 100 [IU]/ML
20 INJECTION, SOLUTION SUBCUTANEOUS ONCE
Status: COMPLETED | OUTPATIENT
Start: 2018-02-03 | End: 2018-02-03

## 2018-02-03 RX ORDER — SODIUM CHLORIDE 9 MG/ML
INJECTION, SOLUTION INTRAVENOUS
Status: DISCONTINUED
Start: 2018-02-03 | End: 2018-02-04

## 2018-02-03 RX ORDER — INSULIN GLARGINE 100 [IU]/ML
55 INJECTION, SOLUTION SUBCUTANEOUS DAILY
Status: DISCONTINUED | OUTPATIENT
Start: 2018-02-04 | End: 2018-02-03

## 2018-02-03 RX ADMIN — PREDNISONE 20 MG: 20 TABLET ORAL at 10:57

## 2018-02-03 RX ADMIN — SODIUM CHLORIDE 10.8 UNITS/HR: 9 INJECTION, SOLUTION INTRAVENOUS at 08:51

## 2018-02-03 RX ADMIN — BUPROPION HYDROCHLORIDE 150 MG: 150 TABLET, EXTENDED RELEASE ORAL at 21:34

## 2018-02-03 RX ADMIN — METHYLPREDNISOLONE SODIUM SUCCINATE 40 MG: 40 INJECTION, POWDER, FOR SOLUTION INTRAMUSCULAR; INTRAVENOUS at 03:32

## 2018-02-03 RX ADMIN — METFORMIN HYDROCHLORIDE 500 MG: 500 TABLET, FILM COATED ORAL at 17:01

## 2018-02-03 RX ADMIN — CLONAZEPAM 1 MG: 0.5 TABLET ORAL at 04:40

## 2018-02-03 RX ADMIN — METFORMIN HYDROCHLORIDE 500 MG: 500 TABLET, FILM COATED ORAL at 08:10

## 2018-02-03 RX ADMIN — IPRATROPIUM BROMIDE AND ALBUTEROL SULFATE 1 AMPULE: .5; 3 SOLUTION RESPIRATORY (INHALATION) at 18:16

## 2018-02-03 RX ADMIN — IPRATROPIUM BROMIDE AND ALBUTEROL SULFATE 1 AMPULE: .5; 3 SOLUTION RESPIRATORY (INHALATION) at 22:04

## 2018-02-03 RX ADMIN — INSULIN GLARGINE 35 UNITS: 100 INJECTION, SOLUTION SUBCUTANEOUS at 09:25

## 2018-02-03 RX ADMIN — SODIUM BICARBONATE: 84 INJECTION, SOLUTION INTRAVENOUS at 15:54

## 2018-02-03 RX ADMIN — IPRATROPIUM BROMIDE AND ALBUTEROL SULFATE 1 AMPULE: .5; 3 SOLUTION RESPIRATORY (INHALATION) at 09:41

## 2018-02-03 RX ADMIN — INSULIN GLARGINE 20 UNITS: 100 INJECTION, SOLUTION SUBCUTANEOUS at 15:00

## 2018-02-03 RX ADMIN — CLONAZEPAM 1 MG: 0.5 TABLET ORAL at 21:38

## 2018-02-03 RX ADMIN — ATORVASTATIN CALCIUM 20 MG: 10 TABLET, FILM COATED ORAL at 21:34

## 2018-02-03 RX ADMIN — IPRATROPIUM BROMIDE AND ALBUTEROL SULFATE 1 AMPULE: .5; 3 SOLUTION RESPIRATORY (INHALATION) at 13:43

## 2018-02-03 RX ADMIN — SODIUM CHLORIDE 8.7 UNITS/HR: 9 INJECTION, SOLUTION INTRAVENOUS at 08:07

## 2018-02-03 RX ADMIN — SODIUM CHLORIDE 8.8 UNITS/HR: 9 INJECTION, SOLUTION INTRAVENOUS at 03:40

## 2018-02-03 RX ADMIN — PREDNISONE 20 MG: 20 TABLET ORAL at 21:38

## 2018-02-03 RX ADMIN — DEXTROSE AND SODIUM CHLORIDE 125 ML/HR: 5; 900 INJECTION, SOLUTION INTRAVENOUS at 22:55

## 2018-02-03 RX ADMIN — LEVOTHYROXINE SODIUM 175 MCG: 75 TABLET ORAL at 21:34

## 2018-02-03 RX ADMIN — BUPROPION HYDROCHLORIDE 150 MG: 150 TABLET, EXTENDED RELEASE ORAL at 08:11

## 2018-02-03 RX ADMIN — SODIUM CHLORIDE 15.16 UNITS/HR: 9 INJECTION, SOLUTION INTRAVENOUS at 18:13

## 2018-02-03 RX ADMIN — IPRATROPIUM BROMIDE AND ALBUTEROL SULFATE 1 AMPULE: .5; 3 SOLUTION RESPIRATORY (INHALATION) at 06:27

## 2018-02-03 RX ADMIN — LEVOFLOXACIN 750 MG: 5 INJECTION, SOLUTION INTRAVENOUS at 12:27

## 2018-02-03 RX ADMIN — ACETAMINOPHEN 650 MG: 325 TABLET ORAL at 19:22

## 2018-02-03 RX ADMIN — ASPIRIN 81 MG: 81 TABLET ORAL at 08:11

## 2018-02-03 RX ADMIN — METOPROLOL SUCCINATE 25 MG: 25 TABLET, FILM COATED, EXTENDED RELEASE ORAL at 08:11

## 2018-02-03 ASSESSMENT — PAIN SCALES - GENERAL
PAINLEVEL_OUTOF10: 0
PAINLEVEL_OUTOF10: 0
PAINLEVEL_OUTOF10: 4
PAINLEVEL_OUTOF10: 0

## 2018-02-03 NOTE — PROGRESS NOTES
Functional Limits  Objective   Bed mobility  Scooting: Supervision  Transfers  Sit to Stand: Independent  Stand to sit: Independent  Bed to Chair: Independent  Ambulation  Ambulation?: Yes  WB Status:  (CONTACT MRSA only cleared ambulation in room)  Ambulation 1  Surface: level tile  Device: Rolling Walker  Assistance: Supervision  Quality of Gait: ar robbins wide MISSY w/labored breathing after each trial  Distance: 40-42'x4  Comments: IV LUE, O2 at 2L  Stairs/Curb  Stairs?:  (NT as CONTACT can't leave pts room)     Balance  Sitting - Static: Good  Sitting - Dynamic: Good  Standing - Static: Good  Standing - Dynamic: Good  Exercises  Hamstring Sets: seated w/red 2x10 each  Hip Flexion: seated 2x10 each  Hip Abduction: seated w/red x20  Knee Long Arc Quad: seated x20 each  Ankle Pumps: seated x20 each  Other exercises  Other exercises?: Yes  Other exercises 1: seated hip add w/pillow x20   Other exercises 2: standing marches 2x10 each  Other exercises 3: standing hamstring curls 2x10 each                      Assessment   Body structures, Functions, Activity limitations: Decreased functional mobility ; Decreased endurance  Assessment: Pt tolerated seated exs w/no increase in fatigue. Pt emi reyes MISSY w/gait trials on 2L O2 via nasal canula and IV pole. Pt has labored breathing after each trial. Extended rest breaks to recoup. Pt states he feels better following session. Continue and increase to tolerance.   Treatment Diagnosis: decreased endurance for functional mobility  Prognosis: Good  REQUIRES PT FOLLOW UP: Yes  Activity Tolerance  Activity Tolerance: Patient Tolerated treatment well;Patient limited by endurance (limited by breathing)       Discharge Recommendations:       G-Code     OutComes Score                                                    AM-PAC Score             Goals  Short term goals  Time Frame for Short term goals: 3-5 days  Short term goal 1: tolerate >= 150ft of walking before need to rest without

## 2018-02-03 NOTE — H&P
SR tablet Take 150 mg by mouth 2 times daily. Yes Historical Provider, MD   lisinopril (PRINIVIL;ZESTRIL) 10 MG tablet Take 10 mg by mouth daily. Yes Historical Provider, MD   insulin NPH (HUMULIN N;NOVOLIN N) 100 UNIT/ML injection vial Inject into the skin 2 times daily (before meals)    Historical Provider, MD   Dulaglutide (TRULICITY) 2.09 YC/7.3NQ SOPN Inject into the skin    Historical Provider, MD   Ipratropium-Albuterol ([address]) Inhale  into the lungs. Historical Provider, MD   phentermine (ADIPEX-P) 37.5 MG tablet Take 37.5 mg by mouth every morning (before breakfast). Historical Provider, MD   atorvastatin (LIPITOR) 40 MG tablet Take 40 mg by mouth daily. Historical Provider, MD   metoprolol (TOPROL-XL) 50 MG XL tablet Take 50 mg by mouth daily. Historical Provider, MD   pantoprazole (PROTONIX) 20 MG tablet Take 20 mg by mouth daily. Historical Provider, MD       Allergies:  Nateglinide    Social History:      The patient currently lives at home    TOBACCO:   reports that he quit smoking about 14 years ago. His smoking use included Cigarettes. He has a 60.00 pack-year smoking history. He has never used smokeless tobacco.  ETOH:   reports that he does not drink alcohol. Family History:       Reviewed in detail and negative for DM, CAD, Cancer, CVA. Positive as follows:        Problem Relation Age of Onset    Stroke Mother     Heart Disease Father        REVIEW OF SYSTEMS:   Pertinent positives as noted in the HPI. All other systems reviewed and negative. PHYSICAL EXAM:    BP (!) 109/48   Pulse 96   Temp 97.3 °F (36.3 °C) (Oral)   Resp 22   Ht 5' 10\" (1.778 m)   Wt 260 lb (117.9 kg)   SpO2 96%   BMI 37.31 kg/m²     General appearance:  No apparent distress, appears stated age and cooperative. HEENT:elevation in parotids bilaterally, soft. Normal cephalic, atraumatic without obvious deformity. Pupils equal, round, and reactive to light.   Extra ocular

## 2018-02-04 LAB
ANION GAP SERPL CALCULATED.3IONS-SCNC: 17 MEQ/L (ref 7–13)
BANDED NEUTROPHILS RELATIVE PERCENT: 14 %
BASOPHILS ABSOLUTE: 0 K/UL (ref 0–0.2)
BASOPHILS RELATIVE PERCENT: 0 %
BUN BLDV-MCNC: 39 MG/DL (ref 8–23)
CALCIUM SERPL-MCNC: 8.3 MG/DL (ref 8.6–10.2)
CHLORIDE BLD-SCNC: 96 MEQ/L (ref 98–107)
CO2: 23 MEQ/L (ref 22–29)
CREAT SERPL-MCNC: 1.58 MG/DL (ref 0.7–1.2)
EOSINOPHILS ABSOLUTE: 0 K/UL (ref 0–0.7)
EOSINOPHILS RELATIVE PERCENT: 0 %
GFR AFRICAN AMERICAN: 54.2
GFR NON-AFRICAN AMERICAN: 44.8
GLUCOSE BLD-MCNC: 160 MG/DL (ref 60–115)
GLUCOSE BLD-MCNC: 165 MG/DL (ref 60–115)
GLUCOSE BLD-MCNC: 167 MG/DL (ref 60–115)
GLUCOSE BLD-MCNC: 174 MG/DL (ref 74–109)
GLUCOSE BLD-MCNC: 175 MG/DL (ref 60–115)
GLUCOSE BLD-MCNC: 179 MG/DL (ref 60–115)
GLUCOSE BLD-MCNC: 190 MG/DL (ref 60–115)
GLUCOSE BLD-MCNC: 208 MG/DL (ref 60–115)
GLUCOSE BLD-MCNC: 212 MG/DL (ref 60–115)
GLUCOSE BLD-MCNC: 229 MG/DL (ref 60–115)
GLUCOSE BLD-MCNC: 291 MG/DL (ref 60–115)
GLUCOSE BLD-MCNC: 309 MG/DL (ref 60–115)
GLUCOSE BLD-MCNC: 335 MG/DL (ref 60–115)
GLUCOSE BLD-MCNC: 344 MG/DL (ref 60–115)
GLUCOSE BLD-MCNC: 375 MG/DL (ref 60–115)
HCT VFR BLD CALC: 36.7 % (ref 42–52)
HEMOGLOBIN: 12.2 G/DL (ref 14–18)
L. PNEUMOPHILA SEROGP 1 UR AG: NEGATIVE
LYMPHOCYTES ABSOLUTE: 1.9 K/UL (ref 1–4.8)
LYMPHOCYTES RELATIVE PERCENT: 6 %
MCH RBC QN AUTO: 28.3 PG (ref 27–31.3)
MCHC RBC AUTO-ENTMCNC: 33.3 % (ref 33–37)
MCV RBC AUTO: 85 FL (ref 80–100)
MONOCYTES ABSOLUTE: 1.6 K/UL (ref 0.2–0.8)
MONOCYTES RELATIVE PERCENT: 5 %
NEUTROPHILS ABSOLUTE: 28.3 K/UL (ref 1.4–6.5)
NEUTROPHILS RELATIVE PERCENT: 75 %
PDW BLD-RTO: 14.1 % (ref 11.5–14.5)
PERFORMED ON: ABNORMAL
PLATELET # BLD: 356 K/UL (ref 130–400)
POTASSIUM SERPL-SCNC: 4.4 MEQ/L (ref 3.5–5.1)
RBC # BLD: 4.32 M/UL (ref 4.7–6.1)
RBC # BLD: NORMAL 10*6/UL
SODIUM BLD-SCNC: 136 MEQ/L (ref 132–144)
WBC # BLD: 31.8 K/UL (ref 4.8–10.8)

## 2018-02-04 PROCEDURE — 6370000000 HC RX 637 (ALT 250 FOR IP): Performed by: INTERNAL MEDICINE

## 2018-02-04 PROCEDURE — 2580000003 HC RX 258: Performed by: INTERNAL MEDICINE

## 2018-02-04 PROCEDURE — 80048 BASIC METABOLIC PNL TOTAL CA: CPT

## 2018-02-04 PROCEDURE — 85025 COMPLETE CBC W/AUTO DIFF WBC: CPT

## 2018-02-04 PROCEDURE — 36415 COLL VENOUS BLD VENIPUNCTURE: CPT

## 2018-02-04 PROCEDURE — 2700000000 HC OXYGEN THERAPY PER DAY

## 2018-02-04 PROCEDURE — 97116 GAIT TRAINING THERAPY: CPT

## 2018-02-04 PROCEDURE — 6360000002 HC RX W HCPCS: Performed by: INTERNAL MEDICINE

## 2018-02-04 PROCEDURE — 1210000000 HC MED SURG R&B

## 2018-02-04 PROCEDURE — 94640 AIRWAY INHALATION TREATMENT: CPT

## 2018-02-04 RX ORDER — INSULIN GLARGINE 100 [IU]/ML
100 INJECTION, SOLUTION SUBCUTANEOUS DAILY
Status: DISCONTINUED | OUTPATIENT
Start: 2018-02-05 | End: 2018-02-05

## 2018-02-04 RX ORDER — INSULIN GLARGINE 100 [IU]/ML
30 INJECTION, SOLUTION SUBCUTANEOUS ONCE
Status: COMPLETED | OUTPATIENT
Start: 2018-02-04 | End: 2018-02-04

## 2018-02-04 RX ORDER — INSULIN GLARGINE 100 [IU]/ML
20 INJECTION, SOLUTION SUBCUTANEOUS ONCE
Status: COMPLETED | OUTPATIENT
Start: 2018-02-04 | End: 2018-02-04

## 2018-02-04 RX ORDER — INSULIN GLARGINE 100 [IU]/ML
40 INJECTION, SOLUTION SUBCUTANEOUS DAILY
Status: DISCONTINUED | OUTPATIENT
Start: 2018-02-04 | End: 2018-02-04

## 2018-02-04 RX ORDER — INSULIN GLARGINE 100 [IU]/ML
70 INJECTION, SOLUTION SUBCUTANEOUS DAILY
Status: DISCONTINUED | OUTPATIENT
Start: 2018-02-05 | End: 2018-02-04

## 2018-02-04 RX ORDER — SODIUM CHLORIDE 9 MG/ML
INJECTION, SOLUTION INTRAVENOUS CONTINUOUS
Status: DISCONTINUED | OUTPATIENT
Start: 2018-02-04 | End: 2018-02-05 | Stop reason: HOSPADM

## 2018-02-04 RX ORDER — SODIUM CHLORIDE 9 MG/ML
INJECTION, SOLUTION INTRAVENOUS
Status: DISCONTINUED
Start: 2018-02-04 | End: 2018-02-04

## 2018-02-04 RX ORDER — INSULIN GLARGINE 100 [IU]/ML
80 INJECTION, SOLUTION SUBCUTANEOUS DAILY
Status: DISCONTINUED | OUTPATIENT
Start: 2018-02-05 | End: 2018-02-04

## 2018-02-04 RX ADMIN — DOCUSATE SODIUM 100 MG: 100 CAPSULE, LIQUID FILLED ORAL at 09:16

## 2018-02-04 RX ADMIN — SODIUM CHLORIDE 9.1 UNITS/HR: 9 INJECTION, SOLUTION INTRAVENOUS at 02:46

## 2018-02-04 RX ADMIN — IPRATROPIUM BROMIDE AND ALBUTEROL SULFATE 1 AMPULE: .5; 3 SOLUTION RESPIRATORY (INHALATION) at 06:13

## 2018-02-04 RX ADMIN — INSULIN GLARGINE 40 UNITS: 100 INJECTION, SOLUTION SUBCUTANEOUS at 09:15

## 2018-02-04 RX ADMIN — ATORVASTATIN CALCIUM 20 MG: 10 TABLET, FILM COATED ORAL at 20:21

## 2018-02-04 RX ADMIN — INSULIN GLARGINE 30 UNITS: 100 INJECTION, SOLUTION SUBCUTANEOUS at 13:23

## 2018-02-04 RX ADMIN — IPRATROPIUM BROMIDE AND ALBUTEROL SULFATE 1 AMPULE: .5; 3 SOLUTION RESPIRATORY (INHALATION) at 21:50

## 2018-02-04 RX ADMIN — INSULIN GLARGINE 20 UNITS: 100 INJECTION, SOLUTION SUBCUTANEOUS at 19:03

## 2018-02-04 RX ADMIN — CLONAZEPAM 1 MG: 0.5 TABLET ORAL at 22:32

## 2018-02-04 RX ADMIN — IPRATROPIUM BROMIDE AND ALBUTEROL SULFATE 1 AMPULE: .5; 3 SOLUTION RESPIRATORY (INHALATION) at 09:33

## 2018-02-04 RX ADMIN — IPRATROPIUM BROMIDE AND ALBUTEROL SULFATE 1 AMPULE: .5; 3 SOLUTION RESPIRATORY (INHALATION) at 13:25

## 2018-02-04 RX ADMIN — LEVOTHYROXINE SODIUM 175 MCG: 75 TABLET ORAL at 20:21

## 2018-02-04 RX ADMIN — SODIUM CHLORIDE: 9 INJECTION, SOLUTION INTRAVENOUS at 09:19

## 2018-02-04 RX ADMIN — SODIUM CHLORIDE, PRESERVATIVE FREE 10 ML: 5 INJECTION INTRAVENOUS at 09:18

## 2018-02-04 RX ADMIN — ACETAMINOPHEN 650 MG: 325 TABLET ORAL at 10:54

## 2018-02-04 RX ADMIN — SODIUM CHLORIDE: 9 INJECTION, SOLUTION INTRAVENOUS at 22:32

## 2018-02-04 RX ADMIN — ASPIRIN 81 MG: 81 TABLET ORAL at 09:16

## 2018-02-04 RX ADMIN — METOPROLOL SUCCINATE 25 MG: 25 TABLET, FILM COATED, EXTENDED RELEASE ORAL at 09:16

## 2018-02-04 RX ADMIN — DEXTROSE AND SODIUM CHLORIDE 125 ML/HR: 5; 900 INJECTION, SOLUTION INTRAVENOUS at 06:24

## 2018-02-04 RX ADMIN — LEVOFLOXACIN 750 MG: 5 INJECTION, SOLUTION INTRAVENOUS at 11:57

## 2018-02-04 RX ADMIN — IPRATROPIUM BROMIDE AND ALBUTEROL SULFATE 1 AMPULE: .5; 3 SOLUTION RESPIRATORY (INHALATION) at 18:08

## 2018-02-04 RX ADMIN — BUPROPION HYDROCHLORIDE 150 MG: 150 TABLET, EXTENDED RELEASE ORAL at 09:16

## 2018-02-04 RX ADMIN — BUPROPION HYDROCHLORIDE 150 MG: 150 TABLET, EXTENDED RELEASE ORAL at 20:21

## 2018-02-04 ASSESSMENT — PAIN DESCRIPTION - FREQUENCY: FREQUENCY: INTERMITTENT

## 2018-02-04 ASSESSMENT — PAIN DESCRIPTION - DESCRIPTORS: DESCRIPTORS: HEADACHE

## 2018-02-04 ASSESSMENT — PAIN SCALES - GENERAL
PAINLEVEL_OUTOF10: 5
PAINLEVEL_OUTOF10: 0
PAINLEVEL_OUTOF10: 2

## 2018-02-04 ASSESSMENT — PAIN DESCRIPTION - LOCATION: LOCATION: HEAD

## 2018-02-04 ASSESSMENT — PAIN DESCRIPTION - PAIN TYPE: TYPE: ACUTE PAIN

## 2018-02-04 ASSESSMENT — PAIN DESCRIPTION - ONSET: ONSET: PROGRESSIVE

## 2018-02-04 ASSESSMENT — PAIN DESCRIPTION - ORIENTATION: ORIENTATION: ANTERIOR

## 2018-02-04 NOTE — PROGRESS NOTES
Physical Therapy  Facility/Department: [facility] MED SURG UNIT  Daily Treatment Note  NAME: Salvatore Salomon  : 1955  MRN: [id number]    Date of Service: 2018    Patient Diagnosis(es):   Patient Active Problem List    Diagnosis Date Noted    DKA, type 2, not at goal [facility]) 2018    Pneumonia 2018       Past Medical History:   Diagnosis Date    [facility]     Depression     [facility]     Hyperlipidemia     Hypertension     MRSA infection 2014    Left Groin     Sleep apnea     [facility]     Type II or unspecified type diabetes mellitus without mention of complication, not stated as uncontrolled      Past Surgical History:   Procedure Laterality Date    ABDOMEN SURGERY      hodgkin     CHOLECYSTECTOMY      COLONOSCOPY      COLONOSCOPY N/A 2016    COLONOSCOPY performed by Oralia Desai MD at [address]      umbilical    SPLENECTOMY, TOTAL      TOOTH EXTRACTION      TOTAL THYROIDECTOMY      CANCER       Restrictions  Restrictions/Precautions  Restrictions/Precautions:  (MRSA R hand, 1st digit in )  Required Braces or Orthoses?: Yes  Implants present? : Metal implants (surgical clamps abdominal region)  Position Activity Restriction  Other position/activity restrictions: 2L 02 via NC  Subjective   General  Chart Reviewed: Yes  Additional Pertinent Hx: MRSA in index finger on right hand in 2018  Family / Caregiver Present: No  Referring Practitioner: Dr Miguel Tijerina  Comments: Pt lying in bed after lunch, agrred to ambulate  Pain Screening  Patient Currently in Pain: Denies  Pain Assessment  Response to Pain Intervention: Drowsy; Patient Satisfied  Vital Signs  BP Location: Left upper arm  Level of Consciousness: Alert  Patient Currently in Pain: Denies  Oxygen Therapy  O2 Device: Nasal cannula       Orientation  Orientation  Overall Orientation Status: Within Functional Limits  Objective   Bed mobility  Scooting: Supervision  Transfers  Sit to Stand: Independent  Stand to sit: Independent  Bed to Chair: Independent  Ambulation  Ambulation?: Yes  WB Status:  (CONTACT MRSA only cleared ambulation in room)  Ambulation 1  Surface: level tile  Device:  (used  pole)  Assistance: Supervision  Quality of Gait: Increased MISSY  Distance: 18' with 2 standing restts  Comments: IV LUE, O2 at 2L  Stairs/Curb  Stairs?: No (pt states doen't need)     Balance  Sitting - Static: Good  Sitting - Dynamic: Good  Standing - Static: Good  Standing - Dynamic: Good  Comments: increased MISSY      AROM RLE (degrees)  RLE AROM: WFL  AROM LLE (degrees)  LLE AROM : WFL  AROM RUE (degrees)  RUE AROM : WFL  AROM LUE (degrees)  LUE AROM : WFL  Strength RLE  Comment: grossly 4+/5 hip, knee, and ankle  Strength LLE  Comment: grossly 4+/5 hip, knee and ankle  Strength RUE  Comment: see OT eval  Strength LUE  Comment: see OT eval                 Assessment   Body structures, Functions, Activity limitations: Decreased functional mobility ; Decreased endurance  Assessment: Pt tolerated seated exs w/no increase in fatigue. Pt demo'd a wide MISSY w/gait trials on 2L O2 via nasal canula and IV pole. Pt has labored breathing after each trial. Extended rest breaks to recoup. Pt states he feels better following session. Continue and increase to tolerance.   Treatment Diagnosis: decreased endurance for functional mobility  Prognosis: Good  REQUIRES PT FOLLOW UP: Yes  Activity Tolerance  Activity Tolerance: Patient Tolerated treatment well (limited by breathing)       Discharge Recommendations:       G-Code     OutComes Score                                                    AM-PAC Score             Goals  Short term goals  Time Frame for Short term goals: 3-5 days  Short term goal 1: tolerate >= 150ft of walking before need to rest without AD or with IV pole  Short term goal 2: Curb step

## 2018-02-04 NOTE — PROGRESS NOTES
Dr Andrew Rawls called the floor today to inform me that he would see this pt 2-5-18. I have since noted the consult has been discontinued and I called the answering service to notify them.

## 2018-02-04 NOTE — PROGRESS NOTES
Hospitalist Progress Note      PCP: Esvin Lopez MD    Date of Admission: 2/2/2018    Chief Complaint: none today    Subjective: pt eating breakfast, looks comfortable     Medications:  Reviewed    Infusion Medications    sodium chloride      dextrose      dextrose      dextrose 5 % and 0.9 % NaCl       Scheduled Medications    insulin glargine  40 Units Subcutaneous Daily    insulin lispro  0-12 Units Subcutaneous TID WC    insulin lispro  0-6 Units Subcutaneous Nightly    aspirin  81 mg Oral Daily    buPROPion  150 mg Oral BID    levothyroxine  175 mcg Oral Daily    metoprolol succinate  25 mg Oral Daily    sodium chloride flush  10 mL Intravenous 2 times per day    docusate sodium  100 mg Oral BID    enoxaparin  40 mg Subcutaneous Daily    levofloxacin  750 mg Intravenous Q24H    ipratropium-albuterol  1 ampule Inhalation Q4H WA    atorvastatin  20 mg Oral Daily     PRN Meds: glucose, dextrose, glucagon (rDNA), dextrose, clonazePAM, sodium chloride flush, acetaminophen, magnesium hydroxide, bisacodyl, ondansetron, glucose, dextrose, glucagon (rDNA), dextrose, dextrose 5 % and 0.9 % NaCl      Intake/Output Summary (Last 24 hours) at 02/04/18 0841  Last data filed at 02/04/18 0149   Gross per 24 hour   Intake              870 ml   Output             3075 ml   Net            -2205 ml       Exam:    /63   Pulse 90   Temp 97.2 °F (36.2 °C) (Oral)   Resp 18   Ht 5' 10\" (1.778 m)   Wt 260 lb (117.9 kg)   SpO2 97%   BMI 37.31 kg/m²     General appearance: No apparent distress, appears stated age and cooperative. HEENT: Pupils equal, round, and reactive to light. Conjunctivae/corneas clear. Neck: Supple, with full range of motion. No jugular venous distention. Trachea midline. Respiratory:  Normal respiratory effort. Clear to auscultation, bilaterally without Rales/Wheezes/Rhonchi. Cardiovascular: Regular rate and rhythm with normal S1/S2 without murmurs, rubs or gallops.   Abdomen: Soft, non-tender, non-distended with normal bowel sounds. Musculoskeletal: No clubbing, cyanosis or edema bilaterally. Full range of motion without deformity. Skin: Skin color, texture, turgor normal.  No rashes or lesions. Neurologic:  Neurovascularly intact without any focal sensory/motor deficits. Cranial nerves: II-XII intact, grossly non-focal.  Psychiatric: Alert and oriented, thought content appropriate, normal insight  Capillary Refill: Brisk,< 3 seconds   Peripheral Pulses: +2 palpable, equal bilaterally       Labs:   Recent Labs      02/02/18   1049  02/03/18   0523  02/04/18   0517   WBC  29.5*  32.6*  31.8*   HGB  14.3  12.5*  12.2*   HCT  42.4  37.7*  36.7*   PLT  264  297  356     Recent Labs      02/03/18   1649  02/03/18   2136  02/04/18   0517   NA  127*  132  136   K  4.4  4.4  4.4   CL  88*  90*  96*   CO2  20*  22  23   BUN  36*  39*  39*   CREATININE  1.72*  1.88*  1.58*   CALCIUM  8.6  8.7  8.3*     Recent Labs      02/02/18   1049   AST  21   ALT  30   BILITOT  1.4*   ALKPHOS  146*     No results for input(s): INR in the last 72 hours. No results for input(s): Ruthine Drafts in the last 72 hours. Urinalysis:    Lab Results   Component Value Date    NITRU Negative 02/02/2018    WBCUA 0-2 10/09/2013    BACTERIA Rare 10/09/2013    RBCUA 0-2 10/09/2013    BLOODU Negative 02/02/2018    SPECGRAV 1.010 02/02/2018    GLUCOSEU 500 02/02/2018       Radiology:  XR CHEST STANDARD (2 VW)   Final Result   NO ACUTE CHEST DISEASE.               Assessment/Plan:    Active Hospital Problems    Diagnosis Date Noted    DKA, type 2, not at goal [facility]) [E13.10] 02/03/2018    Pneumonia [J18.9] 02/02/2018         DVT Prophylaxis: lovenox  Diet: DIET CARB CONTROL; Carb Control: 4 carbs/meal (approximate 1800 kcals/day)  Code Status: Full Code    PT/OT Eval Status:     Dispo - respiratory distress, generalized malaise, had sings of bronchospasm- continue IV atbs, DC steroids, looks better today   DM/DKA- improved with IV insulin, initiate lantus, will DC insulin drip, continue with ISS/diet  HTN- controlled  Leucocytosis with neutrocytosis - pt had history of Hodgkin lymphoma and splenectomy at age 17- may needs to follow hematology after DC   CKD- cr stable  Hypothyroidism- synthroid   Stable for admission at [facility]          Electronically signed by Werner Perez MD on 2/4/2018 at 8:41 AM

## 2018-02-05 VITALS
HEIGHT: 70 IN | HEART RATE: 94 BPM | DIASTOLIC BLOOD PRESSURE: 79 MMHG | TEMPERATURE: 97.3 F | BODY MASS INDEX: 37.22 KG/M2 | WEIGHT: 260 LBS | RESPIRATION RATE: 20 BRPM | SYSTOLIC BLOOD PRESSURE: 158 MMHG | OXYGEN SATURATION: 95 %

## 2018-02-05 LAB
ANION GAP SERPL CALCULATED.3IONS-SCNC: 14 MEQ/L (ref 7–13)
BUN BLDV-MCNC: 27 MG/DL (ref 8–23)
CALCIUM SERPL-MCNC: 8.5 MG/DL (ref 8.6–10.2)
CHLORIDE BLD-SCNC: 100 MEQ/L (ref 98–107)
CO2: 24 MEQ/L (ref 22–29)
CREAT SERPL-MCNC: 1.14 MG/DL (ref 0.7–1.2)
GFR AFRICAN AMERICAN: >60
GFR NON-AFRICAN AMERICAN: >60
GLUCOSE BLD-MCNC: 160 MG/DL (ref 74–109)
GLUCOSE BLD-MCNC: 169 MG/DL (ref 60–115)
GLUCOSE BLD-MCNC: 181 MG/DL (ref 60–115)
PERFORMED ON: ABNORMAL
PERFORMED ON: ABNORMAL
POTASSIUM SERPL-SCNC: 4.9 MEQ/L (ref 3.5–5.1)
SODIUM BLD-SCNC: 138 MEQ/L (ref 132–144)

## 2018-02-05 PROCEDURE — 97110 THERAPEUTIC EXERCISES: CPT

## 2018-02-05 PROCEDURE — 36415 COLL VENOUS BLD VENIPUNCTURE: CPT

## 2018-02-05 PROCEDURE — 94640 AIRWAY INHALATION TREATMENT: CPT

## 2018-02-05 PROCEDURE — 6370000000 HC RX 637 (ALT 250 FOR IP): Performed by: INTERNAL MEDICINE

## 2018-02-05 PROCEDURE — 80048 BASIC METABOLIC PNL TOTAL CA: CPT

## 2018-02-05 PROCEDURE — 97535 SELF CARE MNGMENT TRAINING: CPT

## 2018-02-05 RX ORDER — LEVOFLOXACIN 750 MG/1
750 TABLET ORAL DAILY
Qty: 5 TABLET | Refills: 0 | Status: SHIPPED | OUTPATIENT
Start: 2018-02-05 | End: 2018-02-10

## 2018-02-05 RX ADMIN — METOPROLOL SUCCINATE 25 MG: 25 TABLET, FILM COATED, EXTENDED RELEASE ORAL at 08:48

## 2018-02-05 RX ADMIN — ASPIRIN 81 MG: 81 TABLET ORAL at 08:48

## 2018-02-05 RX ADMIN — BUPROPION HYDROCHLORIDE 150 MG: 150 TABLET, EXTENDED RELEASE ORAL at 08:48

## 2018-02-05 RX ADMIN — IPRATROPIUM BROMIDE AND ALBUTEROL SULFATE 1 AMPULE: .5; 3 SOLUTION RESPIRATORY (INHALATION) at 06:07

## 2018-02-05 ASSESSMENT — PAIN SCALES - GENERAL: PAINLEVEL_OUTOF10: 0

## 2018-02-05 NOTE — PROGRESS NOTES
Talked to pt about making follow up appts with hematology and pt stated. I will go see Dr. Tone Harris first and then I will decide if I want to go see that doctor. I will make my own appointment with her because I work a lot.

## 2018-02-05 NOTE — DISCHARGE SUMMARY
Physician Discharge Summary     Patient ID:  Yenifer Lambert  [id number]  3 y.o.  1955    Admit date: 2/2/2018    Discharge date and time: 2/5/18    Admitting Physician: Yazan Holder MD     Discharge Physician: [facility]    Admission Diagnoses: Pneumonia [J18.9]  Pneumonia [J18.9]    Discharge Diagnoses: URI, DKA resolved    Admission Condition: stable    Discharged Condition:stable    Indication for Admission: ?  PNA    Hospital Course: IV ABX, IV insulin, CXR, IVF    Significant Diagnostic Studies:   Lab Results   Component Value Date    WBC 31.8 (H) 02/04/2018    HGB 12.2 (L) 02/04/2018    HCT 36.7 (L) 02/04/2018    MCV 85.0 02/04/2018     02/04/2018     Lab Results   Component Value Date     02/05/2018    K 4.9 02/05/2018     02/05/2018    CO2 24 02/05/2018    BUN 27 02/05/2018    CREATININE 1.14 02/05/2018    GLUCOSE 160 02/05/2018    CALCIUM 8.5 02/05/2018          Treatments:   Current Facility-Administered Medications   Medication Dose Route Frequency Provider Last Rate Last Dose    0.9 % sodium chloride infusion   Intravenous Continuous Yazan Holder  mL/hr at 02/04/18 2232      insulin lispro (HUMALOG) injection vial 0-12 Units  0-12 Units Subcutaneous TID WC Yazan Holder MD   8 Units at 02/04/18 1705    insulin lispro (HUMALOG) injection vial 0-6 Units  0-6 Units Subcutaneous Nightly Yazan Holder MD   4 Units at 02/04/18 2020    insulin glargine (LANTUS) injection vial 100 Units  100 Units Subcutaneous Daily Yazan Holder MD        glucose (GLUTOSE) 40 % oral gel 15 g  15 g Oral PRN Yazan Holder MD        dextrose 50 % solution 12.5 g  12.5 g Intravenous PRN Yazan Holder MD        glucagon (rDNA) injection 1 mg  1 mg Intramuscular PRN Yazan Holder MD        dextrose 5 % solution  100 mL/hr Intravenous PRN Yazan Holder MD        aspirin EC tablet 81 mg  81 mg Oral Daily Yazan Holder MD   81 mg at 02/04/18 0916    buPROPion [facility]) extended release tablet 150 mg  150 mg Oral BID Caldera Records, MD   150 mg at 02/04/18 2021    clonazePAM (KLONOPIN) tablet 1 mg  1 mg Oral BID PRN Caldera Records, MD   1 mg at 02/04/18 2232    levothyroxine (SYNTHROID) tablet 175 mcg  175 mcg Oral Daily Caldera Records, MD   175 mcg at 02/04/18 2021    metoprolol succinate (TOPROL XL) extended release tablet 25 mg  25 mg Oral Daily Caldera Records, MD   25 mg at 02/04/18 7441    sodium chloride flush 0.9 % injection 10 mL  10 mL Intravenous 2 times per day Caldera Records, MD   10 mL at 02/04/18 0918    sodium chloride flush 0.9 % injection 10 mL  10 mL Intravenous PRN Caldera Records, MD        acetaminophen (TYLENOL) tablet 650 mg  650 mg Oral Q4H PRN Caldera Records, MD   650 mg at 02/04/18 1054    magnesium hydroxide (MILK OF MAGNESIA) 400 MG/5ML suspension 30 mL  30 mL Oral Daily PRN Caldera Records, MD        docusate sodium (COLACE) capsule 100 mg  100 mg Oral BID Caldera Records, MD   100 mg at 02/04/18 5111    bisacodyl (DULCOLAX) suppository 10 mg  10 mg Rectal Daily PRN Caldera Records, MD        ondansetron [facility]) injection 4 mg  4 mg Intravenous Q6H PRN Caldera Records, MD        enoxaparin (LOVENOX) injection 40 mg  40 mg Subcutaneous Daily Caldera Records, MD        levofloxacin (LEVAQUIN) 750 MG/150ML infusion 750 mg  750 mg Intravenous Q24H Caldera Records, MD   Stopped at 02/04/18 1330    ipratropium-albuterol (DUONEB) nebulizer solution 1 ampule  1 ampule Inhalation Q4H WA Caldera Records, MD   1 ampule at 02/05/18 8626    atorvastatin (LIPITOR) tablet 20 mg  20 mg Oral Daily Caldera Records, MD   20 mg at 02/04/18 2021    glucose (GLUTOSE) 40 % oral gel 15 g  15 g Oral PRN Caldera Records, MD        dextrose 50 % solution 12.5 g  12.5 g Intravenous PRN Caldera Records, MD        glucagon (rDNA) injection 1 mg  1 mg Intramuscular PRN Caldera Records, MD        dextrose 5 % solution  100 mL/hr Intravenous PRN Caldera Records, MD        dextrose 5 % and 0.9 % sodium chloride infusion   Intravenous PRN Caldera Records, MD Discharge Exam:  HEENT: AT/NC, PERRLA, no JVD  HEART: s1/s2 wnl w/o s3  LUNG: clear  ABD: soft, NT  EXT: no edema  SKin : no rash  Neuro:no focal deficits      Disposition: home    Patient Instructions: Activity: as toelrated  Diet: diabetic, cardiac diet  PT takes NP twice a day and missed couple of doses bc he didn't feel good    Follow-up with PCP in 1 week  FU hematology in 1 week for chronic leuocytosis  Overtime on d.c summary was 45 min     Medication List      START taking these medications    levofloxacin 750 MG tablet  Commonly known as:  LEVAQUIN  Take 1 tablet by mouth daily for 5 days        CHANGE how you take these medications    metoprolol succinate 25 MG extended release tablet  Commonly known as:  TOPROL XL  What changed:  Another medication with the same name was removed. Continue taking this medication, and follow the directions you see here. CONTINUE taking these medications    amLODIPine-atorvastatatin 10-20 MG per tablet  Commonly known as:  CADUET     aspirin 81 MG EC tablet     atorvastatin 40 MG tablet  Commonly known as:  LIPITOR     clonazePAM 1 MG tablet  Commonly known as:  KLONOPIN     COMBIVENT RESPIMAT IN     insulin  UNIT/ML injection vial  Commonly known as:  HUMULIN N;NOVOLIN N     levothyroxine 175 MCG tablet  Commonly known as:  SYNTHROID     lisinopril 10 MG tablet  Commonly known as:  PRINIVIL;ZESTRIL     pantoprazole 20 MG tablet  Commonly known as:  PROTONIX     phentermine 37.5 MG tablet  Commonly known as:  ADIPEX-P     WELLBUTRIN  MG extended release tablet  Generic drug:  buPROPion        STOP taking these medications    TRULICITY 8.22 HORTENSIA/8.8HA Sopn  Generic drug:  Dulaglutide           Where to Get Your Medications      These medications were sent to [address] [place] [address],[address], [address] [facility]. [place] [phone] - F [phone]  [address] [place] [address]    Phone:  [phone]   · levofloxacin 750 MG tablet Nic Matthews  2/5/2018  8:32 AM

## 2018-02-05 NOTE — PROGRESS NOTES
Chart reviewed. Patient's care discussed in daily quality rounds. Patient assessed by PT/OT and recommending that upon DC patient DC home independently. Patient is reported to have no DC needs. This  met with patient to discuss DC planning. Upon visit patient is alert and laying in hospital bed. Patient greets this  with a smile. Patient appears well groomed and dressed in clean clothing from home. Patient reports being retired and lives at home with girlfriend. Patient reports being independent for ADLS. Patient reports being able to afford cost of food and medication. This  provided patient with resources regarding medication assistance if needed. Patient reports no issues at time time covering cost of medication. Patient identifies no DC needs.   SS to follow as needed while patient is at [facility].

## 2018-02-05 NOTE — PROGRESS NOTES
Nutrition Assessment    Type and Reason for Visit: Initial (admitted with DKA, pneumonia)    Nutrition Diagnosis:   · Problem: Altered nutrition-related lab values  · Etiology: related to Endocrine dysfunction     Signs and symptoms:  as evidenced by Lab values    Nutrition Assessment:  · Subjective Assessment: Pt visited in his room, he is dressed and ready to d/c home. Asked pt if he had any diet concerns or questions he would like to discuss. He states that he has information at home and watches how much he eats, but doesn't count CHO. He reports his usual glucose reading is < 200. Encourage him to review his diet habits and information he has at home. Discuss that a small wieght loss could benefit his diabetes, HTN, and abnormal lipids. Aslo would benefit his joints. Encourage him to look at his portion sizes of CHO rich foods and meal spacing to improve his glycemic control. He appears open to suggestions. ·   · Wound Type: None  · Current Nutrition Therapies:  · Oral Diet Orders: Carb Control 4 Carbs/Meal   · Anthropometric Measures:  · Ht: 5' 10\" (177.8 cm)   · Current Body Wt: 260 lb (117.9 kg)  · Usual Body Wt: 260 lb (117.9 kg)  · Ideal Body Wt: 166 lb (75.3 kg), % Ideal Body > 100%  · BMI Classification: BMI 35.0 - 39.9 Obese Class II  · Comparative Standards (Estimated Nutrition Needs):  · Estimated Daily Total Kcal: 9762-3262  · Estimated Daily Protein (g):     Nutrition Risk Level: Moderate    Nutrition Interventions:   Continue current diet  Discharge Planning, Education declined      See Adult Nutrition Doc Flowsheet for more detail.      Electronically signed by Marina Hopkins RD, LD on 2/5/18 at 11:30 AM

## 2018-02-07 LAB
BLOOD CULTURE, ROUTINE: NORMAL
CULTURE, BLOOD 2: NORMAL

## 2018-07-13 ENCOUNTER — TELEPHONE (OUTPATIENT)
Dept: CASE MANAGEMENT | Age: 63
End: 2018-07-13

## 2018-07-16 ENCOUNTER — HOSPITAL ENCOUNTER (OUTPATIENT)
Dept: CT IMAGING | Age: 63
Discharge: HOME | End: 2018-07-18
Payer: MEDICARE

## 2018-07-16 DIAGNOSIS — Z87.891 PERSONAL HISTORY OF TOBACCO USE, PRESENTING HAZARDS TO HEALTH: ICD-10-CM

## 2018-07-16 PROCEDURE — G0297 LDCT FOR LUNG CA SCREEN: HCPCS

## 2019-07-02 ENCOUNTER — TELEPHONE (OUTPATIENT)
Dept: INTERVENTIONAL RADIOLOGY/VASCULAR | Age: 64
End: 2019-07-02

## 2019-07-17 ENCOUNTER — HOSPITAL ENCOUNTER (OUTPATIENT)
Dept: CT IMAGING | Age: 64
Discharge: HOME | End: 2019-07-19
Payer: MEDICARE

## 2019-07-17 DIAGNOSIS — Z72.0 TOBACCO USE: ICD-10-CM

## 2019-07-17 PROCEDURE — G0297 LDCT FOR LUNG CA SCREEN: HCPCS

## 2020-08-14 ENCOUNTER — HOSPITAL ENCOUNTER (OUTPATIENT)
Age: 65
Setting detail: SPECIMEN
Discharge: HOME | End: 2020-08-14
Payer: MEDICARE

## 2020-08-14 PROCEDURE — 87186 SC STD MICRODIL/AGAR DIL: CPT

## 2020-08-14 PROCEDURE — 87086 URINE CULTURE/COLONY COUNT: CPT

## 2020-08-14 PROCEDURE — 87077 CULTURE AEROBIC IDENTIFY: CPT

## 2020-08-17 LAB
ORGANISM: ABNORMAL
URINE CULTURE, ROUTINE: ABNORMAL

## 2021-10-04 ENCOUNTER — HOSPITAL ENCOUNTER (OUTPATIENT)
Age: 66
Setting detail: SPECIMEN
Discharge: HOME | End: 2021-10-04
Payer: MEDICARE

## 2021-10-04 PROCEDURE — 87086 URINE CULTURE/COLONY COUNT: CPT

## 2021-10-06 LAB — URINE CULTURE, ROUTINE: NORMAL

## 2021-10-13 ENCOUNTER — TELEPHONE (OUTPATIENT)
Dept: CASE MANAGEMENT | Age: 66
End: 2021-10-13

## 2021-10-13 NOTE — TELEPHONE ENCOUNTER
Physician documentation on smoking history and CT Lung Screening reviewed. All required documentation complete. Patient is a former smoker (quit 2008) with a 70 pack year history ( 2 ppd x 35 years) per physician documentation. Discharged

## 2021-10-15 ENCOUNTER — HOSPITAL ENCOUNTER (OUTPATIENT)
Age: 66
Setting detail: SPECIMEN
Discharge: HOME | End: 2021-10-15
Payer: MEDICARE

## 2021-10-15 PROCEDURE — 87186 SC STD MICRODIL/AGAR DIL: CPT

## 2021-10-15 PROCEDURE — 87086 URINE CULTURE/COLONY COUNT: CPT

## 2021-10-15 PROCEDURE — 87077 CULTURE AEROBIC IDENTIFY: CPT

## 2021-10-18 LAB
ORGANISM: ABNORMAL
URINE CULTURE, ROUTINE: ABNORMAL

## 2021-10-22 ENCOUNTER — HOSPITAL ENCOUNTER (OUTPATIENT)
Dept: CT IMAGING | Age: 66
Discharge: HOME | End: 2021-10-24
Payer: MEDICARE

## 2021-10-22 DIAGNOSIS — Z87.891 PERSONAL HISTORY OF TOBACCO USE, PRESENTING HAZARDS TO HEALTH: ICD-10-CM

## 2021-10-22 PROCEDURE — 71271 CT THORAX LUNG CANCER SCR C-: CPT

## 2021-11-03 ENCOUNTER — ANESTHESIA EVENT (OUTPATIENT)
Dept: ENDOSCOPY | Age: 66
End: 2021-11-03
Payer: MEDICARE

## 2021-11-04 ENCOUNTER — ANCILLARY PROCEDURE (OUTPATIENT)
Dept: ENDOSCOPY | Age: 66
End: 2021-11-04
Attending: SPECIALIST
Payer: MEDICARE

## 2021-11-04 ENCOUNTER — ANESTHESIA (OUTPATIENT)
Dept: ENDOSCOPY | Age: 66
End: 2021-11-04
Payer: MEDICARE

## 2021-11-04 ENCOUNTER — HOSPITAL ENCOUNTER (OUTPATIENT)
Age: 66
Setting detail: OUTPATIENT SURGERY
Discharge: HOME | End: 2021-11-04
Attending: SPECIALIST | Admitting: SPECIALIST
Payer: MEDICARE

## 2021-11-04 VITALS
OXYGEN SATURATION: 97 % | DIASTOLIC BLOOD PRESSURE: 60 MMHG | RESPIRATION RATE: 15 BRPM | SYSTOLIC BLOOD PRESSURE: 131 MMHG

## 2021-11-04 VITALS
BODY MASS INDEX: 34.36 KG/M2 | OXYGEN SATURATION: 98 % | WEIGHT: 240 LBS | DIASTOLIC BLOOD PRESSURE: 60 MMHG | HEIGHT: 70 IN | HEART RATE: 80 BPM | SYSTOLIC BLOOD PRESSURE: 110 MMHG | RESPIRATION RATE: 16 BRPM | TEMPERATURE: 97.5 F

## 2021-11-04 LAB
GLUCOSE BLD-MCNC: 159 MG/DL (ref 60–115)
PERFORMED ON: ABNORMAL

## 2021-11-04 PROCEDURE — 2580000003 HC RX 258

## 2021-11-04 PROCEDURE — 2500000003 HC RX 250 WO HCPCS: Performed by: NURSE ANESTHETIST, CERTIFIED REGISTERED

## 2021-11-04 PROCEDURE — 7100000010 HC PHASE II RECOVERY - FIRST 15 MIN: Performed by: SPECIALIST

## 2021-11-04 PROCEDURE — 6370000000 HC RX 637 (ALT 250 FOR IP): Performed by: SPECIALIST

## 2021-11-04 PROCEDURE — 3700000001 HC ADD 15 MINUTES (ANESTHESIA): Performed by: SPECIALIST

## 2021-11-04 PROCEDURE — 3700000000 HC ANESTHESIA ATTENDED CARE: Performed by: SPECIALIST

## 2021-11-04 PROCEDURE — 6360000002 HC RX W HCPCS: Performed by: NURSE ANESTHETIST, CERTIFIED REGISTERED

## 2021-11-04 PROCEDURE — 2580000003 HC RX 258: Performed by: SPECIALIST

## 2021-11-04 PROCEDURE — 7100000011 HC PHASE II RECOVERY - ADDTL 15 MIN: Performed by: SPECIALIST

## 2021-11-04 PROCEDURE — 88305 TISSUE EXAM BY PATHOLOGIST: CPT

## 2021-11-04 PROCEDURE — 45385 COLONOSCOPY W/LESION REMOVAL: CPT | Performed by: SPECIALIST

## 2021-11-04 PROCEDURE — 45380 COLONOSCOPY AND BIOPSY: CPT | Performed by: SPECIALIST

## 2021-11-04 PROCEDURE — 3609027000 HC COLONOSCOPY: Performed by: SPECIALIST

## 2021-11-04 PROCEDURE — 2709999900 HC NON-CHARGEABLE SUPPLY: Performed by: SPECIALIST

## 2021-11-04 RX ORDER — SODIUM CHLORIDE 9 MG/ML
INJECTION, SOLUTION INTRAVENOUS
Status: COMPLETED
Start: 2021-11-04 | End: 2021-11-04

## 2021-11-04 RX ORDER — LIDOCAINE HYDROCHLORIDE 20 MG/ML
INJECTION, SOLUTION INFILTRATION; PERINEURAL PRN
Status: DISCONTINUED | OUTPATIENT
Start: 2021-11-04 | End: 2021-11-04 | Stop reason: SDUPTHER

## 2021-11-04 RX ORDER — TAMSULOSIN HYDROCHLORIDE 0.4 MG/1
0.4 CAPSULE ORAL DAILY
COMMUNITY

## 2021-11-04 RX ORDER — PROPOFOL 10 MG/ML
INJECTION, EMULSION INTRAVENOUS PRN
Status: DISCONTINUED | OUTPATIENT
Start: 2021-11-04 | End: 2021-11-04 | Stop reason: SDUPTHER

## 2021-11-04 RX ORDER — SODIUM CHLORIDE 9 MG/ML
INJECTION, SOLUTION INTRAVENOUS CONTINUOUS
Status: DISCONTINUED | OUTPATIENT
Start: 2021-11-04 | End: 2021-11-04 | Stop reason: HOSPADM

## 2021-11-04 RX ORDER — SIMETHICONE 20 MG/.3ML
EMULSION ORAL PRN
Status: DISCONTINUED | OUTPATIENT
Start: 2021-11-04 | End: 2021-11-04 | Stop reason: ALTCHOICE

## 2021-11-04 RX ORDER — SODIUM BICARBONATE 650 MG/1
650 TABLET ORAL 2 TIMES DAILY
COMMUNITY

## 2021-11-04 RX ORDER — GABAPENTIN 300 MG/1
300 CAPSULE ORAL 3 TIMES DAILY
COMMUNITY

## 2021-11-04 RX ORDER — MAGNESIUM HYDROXIDE 1200 MG/15ML
LIQUID ORAL PRN
Status: DISCONTINUED | OUTPATIENT
Start: 2021-11-04 | End: 2021-11-04 | Stop reason: ALTCHOICE

## 2021-11-04 RX ADMIN — SODIUM CHLORIDE: 9 INJECTION, SOLUTION INTRAVENOUS at 12:37

## 2021-11-04 RX ADMIN — PROPOFOL 200 MG: 10 INJECTION, EMULSION INTRAVENOUS at 13:17

## 2021-11-04 RX ADMIN — PROPOFOL 200 MG: 10 INJECTION, EMULSION INTRAVENOUS at 13:07

## 2021-11-04 RX ADMIN — PHENYLEPHRINE HYDROCHLORIDE 100 MCG: 10 INJECTION INTRAVENOUS at 13:18

## 2021-11-04 RX ADMIN — LIDOCAINE HYDROCHLORIDE 40 MG: 20 INJECTION, SOLUTION INFILTRATION; PERINEURAL at 13:07

## 2021-11-04 ASSESSMENT — PAIN - FUNCTIONAL ASSESSMENT: PAIN_FUNCTIONAL_ASSESSMENT: 0-10

## 2021-11-04 NOTE — H&P
Patient Name: Sania Crandall  : 1955  MRN: [id number]  DATE: 21      ENDOSCOPY  History and Physical    Procedure:    [x] Diagnostic Colonoscopy       [] Screening Colonoscopy  [] EGD      [] ERCP      [] EUS       [] Other    [x] Previous office notes/History and Physical reviewed from the patients chart. Please see EMR for further details of HPI. I have examined the patient's status immediately prior to the procedure and:      Indications/HPI:    []Abdominal Pain  []Cancer- GI/Lung  []Fhx of colon CA/polyps  []History of Polyps  []Cavazsos   []Melena  []Abnormal Imaging  []Dysphagia    []Persistent Pneumonia  []Anemia  []Food Impaction  [x]History of Polyps  []GI Bleed  []Pulmonary nodule/Mass  []Change in bowel habits []Heartburn/Reflux  []Rectal Bleed (BRBPR)  []Chest Pain - Non Cardiac []Heme (+) Stoo  l[]Ulcers  []Constipation  []Hemoptysis   []Varices  []Diarrhea  []Hypoxemia  []Nausea/Vomiting  []Screening   []Crohns/Colitis  []Other:     Anesthesia:   [x] MAC [] Moderate Sedation   [] General   [] None     ROS: 12 pt Review of Symptoms was negative unless mentioned above    Medications:   Prior to Admission medications    Medication Sig Start Date End Date Taking? Authorizing Provider   tamsulosin (FLOMAX) 0.4 MG capsule Take 0.4 mg by mouth daily   Yes Historical Provider, MD   gabapentin (NEURONTIN) 300 MG capsule Take 300 mg by mouth 3 times daily. Yes Historical Provider, MD   insulin regular (HUMULIN R;NOVOLIN R) 100 UNIT/ML injection Inject into the skin See Admin Instructions 10-15 units with meals   Yes Historical Provider, MD   sodium bicarbonate 650 MG tablet Take 650 mg by mouth 2 times daily   Yes Historical Provider, MD   metoprolol (TOPROL-XL) 25 MG XL tablet  14  Yes Historical Provider, MD   Ipratropium-Albuterol ([address]) Inhale  into the lungs. Yes Historical Provider, MD   levothyroxine (SYNTHROID) 175 MCG tablet Take 175 mcg by mouth Daily.    Yes Historical Provider, MD   buPROPion SR (WELLBUTRIN SR) 150 MG SR tablet Take 150 mg by mouth 2 times daily. Yes Historical Provider, MD   lisinopril (PRINIVIL;ZESTRIL) 10 MG tablet Take 10 mg by mouth daily. Yes Historical Provider, MD   aspirin 81 MG EC tablet Take 81 mg by mouth daily    Historical Provider, MD   insulin NPH (HUMULIN N;NOVOLIN N) 100 UNIT/ML injection vial Inject 30 Units into the skin 2 times daily (before meals)     Historical Provider, MD   clonazePAM (KLONOPIN) 1 MG tablet Take 1 mg by mouth 2 times daily as needed    Historical Provider, MD   phentermine (ADIPEX-P) 37.5 MG tablet Take 37.5 mg by mouth every morning (before breakfast). Historical Provider, MD   amLODIPine-atorvastatatin (CADUET) 10-20 MG per tablet Take 1 tablet by mouth daily. Historical Provider, MD   atorvastatin (LIPITOR) 40 MG tablet Take 40 mg by mouth daily. Historical Provider, MD   pantoprazole (PROTONIX) 20 MG tablet Take 20 mg by mouth daily. Historical Provider, MD       Allergies:    Allergies   Allergen Reactions    Metformin And Related Diarrhea    Nateglinide Nausea Only        History of allergic reaction to anesthesia:  No    Past Medical History:  Past Medical History:   Diagnosis Date    COPD (chronic obstructive pulmonary disease) ([address]     Depression     Hodgkin disease ([address]     Hyperlipidemia     Hypertension     MRSA infection 9/2014    Left Groin     Sleep apnea     Thyroid cancer ([address]     Type II or unspecified type diabetes mellitus without mention of complication, not stated as uncontrolled        Past Surgical History:  Past Surgical History:   Procedure Laterality Date    ABDOMEN SURGERY      hodgkin     CHOLECYSTECTOMY      COLONOSCOPY      COLONOSCOPY N/A 11/22/2016    COLONOSCOPY performed by Janet Leigh MD at [facility]      umbilical    SPLENECTOMY, TOTAL     [address] [facility] THYROIDECTOMY      CANCER       Social History:  Social History     Tobacco Use    Smoking status: Former Smoker     Packs/day: 2.00     Years: 35.00     Pack years: 70.00     Types: Cigarettes     Start date:      Quit date:      Years since quittin.8    Smokeless tobacco: Never Used   Vaping Use    Vaping Use: Never used   Substance Use Topics    Alcohol use: No    Drug use: No       Vital Signs:   Vitals:    21 1135   BP: (!) 179/74   Pulse: 75   Resp: 18   Temp: 97.5 °F (36.4 °C)   SpO2: 98%        Physical Exam:  Cardiac:  [x]WNL  []Comments:  Pulmonary:  [x]WNL   []Comments:   Neuro/Mental Status:  [x]WNL  []Comments:  Abdominal:  [x]WNL    []Comments:  Other:   []WNL  []Comments:    Informed Consent:  The risks and benefits of the procedure have been discussed with either the patient or if they cannot consent, their representative. Assessment:  Patient examined and appropriate for planned sedation and procedure. Plan:  Proceed with planned sedation and procedure as above.     Catarino Pennington MD  11:46 AM

## 2021-11-04 NOTE — ANESTHESIA POSTPROCEDURE EVALUATION
Department of Anesthesiology  Postprocedure Note    Patient: Everardo Kilgore  MRN: [id number]  YOB: 1955  Date of evaluation: 11/4/2021  Time:  1:31 PM     Procedure Summary     Date: 11/04/21 Room / Location: [facility] OR 01 / [facility]    Anesthesia Start: 1302 Anesthesia Stop: 1331    Procedure: COLORECTAL CANCER SCREENING, HIGH RISK WITH POLYPECTOMY (N/A ) Diagnosis: (History of colon polyps Z86.010)    Surgeons: Janet Leigh MD Responsible Provider: ROBERT Palomares CRNA    Anesthesia Type: MAC ASA Status: 3          Anesthesia Type: MAC    Cheo Phase I: Cheo Score: 10    Cheo Phase II:      Last vitals: Reviewed and per EMR flowsheets.        Anesthesia Post Evaluation    Patient location during evaluation: bedside  Patient participation: complete - patient participated  Level of consciousness: awake and awake and alert  Pain score: 0  Airway patency: patent  Nausea & Vomiting: no nausea and no vomiting  Complications: no  Cardiovascular status: blood pressure returned to baseline and hemodynamically stable  Respiratory status: acceptable and face mask  Hydration status: euvolemic

## 2021-11-04 NOTE — ANESTHESIA PRE PROCEDURE
Department of Anesthesiology  Preprocedure Note       Name:  Claudia Raymond   Age:  61 y.o.  :  1955                                          MRN:  [id number]         Date:  2021      Surgeon: Deep Larry):  Areli Mcintosh MD    Procedure: Procedure(s):  COLORECTAL CANCER SCREENING, HIGH RISK    Medications prior to admission:   Prior to Admission medications    Medication Sig Start Date End Date Taking? Authorizing Provider   aspirin 81 MG EC tablet Take 81 mg by mouth daily    Historical Provider, MD   insulin NPH (HUMULIN N;NOVOLIN N) 100 UNIT/ML injection vial Inject into the skin 2 times daily (before meals)    Historical Provider, MD   clonazePAM (KLONOPIN) 1 MG tablet Take 1 mg by mouth 2 times daily as needed    Historical Provider, MD   metoprolol (TOPROL-XL) 25 MG XL tablet  14   Historical Provider, MD   Ipratropium-Albuterol ([address]) Inhale  into the lungs. Historical Provider, MD   phentermine (ADIPEX-P) 37.5 MG tablet Take 37.5 mg by mouth every morning (before breakfast). Historical Provider, MD   levothyroxine (SYNTHROID) 175 MCG tablet Take 175 mcg by mouth Daily. Historical Provider, MD   amLODIPine-atorvastatatin (CADUET) 10-20 MG per tablet Take 1 tablet by mouth daily. Historical Provider, MD   atorvastatin (LIPITOR) 40 MG tablet Take 40 mg by mouth daily. Historical Provider, MD   buPROPion SR (WELLBUTRIN SR) 150 MG SR tablet Take 150 mg by mouth 2 times daily. Historical Provider, MD   lisinopril (PRINIVIL;ZESTRIL) 10 MG tablet Take 10 mg by mouth daily. Historical Provider, MD   pantoprazole (PROTONIX) 20 MG tablet Take 20 mg by mouth daily. Historical Provider, MD       Current medications:    No current facility-administered medications for this encounter. Allergies:     Allergies   Allergen Reactions    Nateglinide Nausea Only       Problem List:    Patient Active Problem List   Diagnosis Code    Pneumonia J18.9    DKA, type 2, not at goal [facility]) E11.10       Past Medical History:        Diagnosis Date    [facility]     Depression     Hodgkin disease ([facility]     Hyperlipidemia     Hypertension     MRSA infection 2014    Left Groin     Sleep apnea     [facility]roid [facility] ([facility]     Type II or unspecified type diabetes mellitus without mention of complication, not stated as uncontrolled        Past Surgical History:        Procedure Laterality Date    ABDOMEN SURGERY      hodgkin     CHOLECYSTECTOMY      COLONOSCOPY      COLONOSCOPY N/A 2016    COLONOSCOPY performed by Charlene Mendoza MD at [address]      umbilical    SPLENECTOMY, TOTAL      TOOTH EXTRACTION      TOTAL THYROIDECTOMY      CANCER       Social History:    Social History     Tobacco Use    Smoking status: Former Smoker     Packs/day: 2.00     Years: 35.00     Pack years: 70.00     Types: Cigarettes     Start date:      Quit date:      Years since quittin.8    Smokeless tobacco: Never Used   Substance Use Topics    Alcohol use: No                                Counseling given: Not Answered      Vital Signs (Current): There were no vitals filed for this visit.                                            BP Readings from Last 3 Encounters:   18 (!) 158/79   17 124/68   10/22/17 (!) 142/63       NPO Status:                                                                                 BMI:   Wt Readings from Last 3 Encounters:   18 260 lb (117.9 kg)   17 269 lb (122 kg)   10/22/17 250 lb (113.4 kg)     There is no height or weight on file to calculate BMI.    CBC:   Lab Results   Component Value Date    WBC 31.8 2018    RBC 4.32 2018    HGB 12.2 2018    HCT 36.7 2018    MCV 85.0 2018    RDW 14.1 2018     2018       CMP:   Lab Results   Component Value Date     2018 K 4.9 02/05/2018     02/05/2018    CO2 24 02/05/2018    BUN 27 02/05/2018    CREATININE 1.14 02/05/2018    GFRAA >60.0 02/05/2018    LABGLOM >60.0 02/05/2018    GLUCOSE 160 02/05/2018    PROT 7.2 02/02/2018    CALCIUM 8.5 02/05/2018    BILITOT 1.4 02/02/2018    ALKPHOS 146 02/02/2018    AST 21 02/02/2018    ALT 30 02/02/2018       POC Tests: No results for input(s): POCGLU, POCNA, POCK, POCCL, POCBUN, POCHEMO, POCHCT in the last 72 hours. Coags:   Lab Results   Component Value Date    PROTIME 9.8 09/17/2014    INR 0.9 09/17/2014    APTT 25.4 09/17/2014       HCG (If Applicable): No results found for: PREGTESTUR, PREGSERUM, HCG, HCGQUANT     ABGs: No results found for: PHART, PO2ART, AHE3SVB, DIJ1CQG, BEART, K0JFOIMP     Type & Screen (If Applicable):  No results found for: LABABO, LABRH    Drug/Infectious Status (If Applicable):  No results found for: HIV, HEPCAB    COVID-19 Screening (If Applicable): No results found for: COVID19        Anesthesia Evaluation  Patient summary reviewed and Nursing notes reviewed no history of anesthetic complications:   Airway: Mallampati: III  TM distance: >3 FB   Neck ROM: full  Mouth opening: > = 3 FB Dental: normal exam         Pulmonary:normal exam    (+) pneumonia:  COPD:  sleep apnea:                             Cardiovascular:  Exercise tolerance: good (>4 METS),   (+) hypertension:,                   Neuro/Psych:   (+) psychiatric history:            GI/Hepatic/Renal: Neg GI/Hepatic/Renal ROS            Endo/Other:    (+) Diabetes, . Abdominal:   (+) obese,           Vascular: negative vascular ROS. Other Findings:             Anesthesia Plan      MAC     ASA 3       Induction: intravenous. Anesthetic plan and risks discussed with patient. Use of blood products discussed with patient whom. Plan discussed with CRNA.                   ROBERT Duarte - ABNRE   11/4/2021

## 2022-10-17 ENCOUNTER — APPOINTMENT (OUTPATIENT)
Dept: GENERAL RADIOLOGY | Age: 67
End: 2022-10-17
Payer: MEDICARE

## 2022-10-17 ENCOUNTER — HOSPITAL ENCOUNTER (EMERGENCY)
Age: 67
Discharge: HOME | End: 2022-10-17
Attending: EMERGENCY MEDICINE
Payer: MEDICARE

## 2022-10-17 VITALS
TEMPERATURE: 98.6 F | WEIGHT: 240 LBS | HEART RATE: 79 BPM | OXYGEN SATURATION: 94 % | HEIGHT: 70 IN | RESPIRATION RATE: 18 BRPM | SYSTOLIC BLOOD PRESSURE: 116 MMHG | BODY MASS INDEX: 34.36 KG/M2 | DIASTOLIC BLOOD PRESSURE: 51 MMHG

## 2022-10-17 DIAGNOSIS — U07.1 COVID-19 VIRUS INFECTION: ICD-10-CM

## 2022-10-17 DIAGNOSIS — J40 BRONCHITIS: ICD-10-CM

## 2022-10-17 DIAGNOSIS — R50.9 FEBRILE ILLNESS, ACUTE: Primary | ICD-10-CM

## 2022-10-17 LAB
ALBUMIN SERPL-MCNC: 4 G/DL (ref 3.5–4.6)
ALP BLD-CCNC: 167 U/L (ref 35–104)
ALT SERPL-CCNC: 30 U/L (ref 0–41)
AMORPHOUS: NORMAL
ANION GAP SERPL CALCULATED.3IONS-SCNC: 14 MEQ/L (ref 9–15)
AST SERPL-CCNC: 50 U/L (ref 0–40)
BASOPHILS ABSOLUTE: 0 K/UL (ref 0–0.1)
BASOPHILS RELATIVE PERCENT: 1.3 % (ref 0.2–1.2)
BILIRUB SERPL-MCNC: 0.9 MG/DL (ref 0.2–0.7)
BILIRUBIN URINE: NEGATIVE
BLOOD, URINE: NEGATIVE
BUN BLDV-MCNC: 18 MG/DL (ref 8–23)
CALCIUM SERPL-MCNC: 8.6 MG/DL (ref 8.5–9.9)
CHLORIDE BLD-SCNC: 99 MEQ/L (ref 95–107)
CLARITY: CLEAR
CO2: 21 MEQ/L (ref 20–31)
COLOR: YELLOW
CREAT SERPL-MCNC: 1.44 MG/DL (ref 0.7–1.2)
EOSINOPHILS ABSOLUTE: 1.1 K/UL (ref 0–0.5)
EOSINOPHILS RELATIVE PERCENT: 7 % (ref 0.8–7)
EPITHELIAL CELLS, UA: NORMAL /HPF
GFR AFRICAN AMERICAN: 59.5
GFR SERPL CREATININE-BSD FRML MDRD: 53.7 ML/MIN/{1.73_M2}
GLOBULIN: 2.6 G/DL (ref 2.3–3.5)
GLUCOSE BLD-MCNC: 134 MG/DL (ref 70–99)
GLUCOSE URINE: NEGATIVE MG/DL
HCT VFR BLD CALC: 37.1 % (ref 42–52)
HEMOGLOBIN: 12.1 G/DL (ref 13.7–17.5)
IMMATURE GRANULOCYTES #: 0.2 K/UL
IMMATURE GRANULOCYTES %: 1 %
INR BLD: 1
KETONES, URINE: NEGATIVE MG/DL
LEUKOCYTE ESTERASE, URINE: NORMAL
LYMPHOCYTES ABSOLUTE: 1.4 K/UL (ref 1.3–3.6)
LYMPHOCYTES RELATIVE PERCENT: 9 %
MAGNESIUM: 1.5 MG/DL (ref 1.7–2.4)
MCH RBC QN AUTO: 27.8 PG (ref 25.7–32.2)
MCHC RBC AUTO-ENTMCNC: 32.6 % (ref 32.3–36.5)
MCV RBC AUTO: 85.3 FL (ref 79–92.2)
MONOCYTES ABSOLUTE: 2.5 K/UL (ref 0.3–0.8)
MONOCYTES RELATIVE PERCENT: 16 % (ref 5.3–12.2)
MUCUS: PRESENT /LPF
NEUTROPHILS ABSOLUTE: 10.7 K/UL (ref 1.8–5.4)
NEUTROPHILS RELATIVE PERCENT: 68 % (ref 34–67.9)
NITRITE, URINE: NEGATIVE
PDW BLD-RTO: 14.1 % (ref 11.6–14.4)
PH UA: 5.5 (ref 5–9)
PLATELET # BLD: 349 K/UL (ref 163–337)
POTASSIUM SERPL-SCNC: 4.4 MEQ/L (ref 3.4–4.9)
PRO-BNP: 502 PG/ML
PROTEIN UA: NEGATIVE MG/DL
PROTHROMBIN TIME: 13.7 SEC (ref 12.3–14.9)
RBC # BLD: 4.35 M/UL (ref 4.63–6.08)
SARS-COV-2, NAAT: DETECTED
SODIUM BLD-SCNC: 134 MEQ/L (ref 135–144)
SPECIFIC GRAVITY UA: 1.02 (ref 1–1.03)
TOTAL PROTEIN: 6.6 G/DL (ref 6.3–8)
TROPONIN: <0.01 NG/ML (ref 0–0.01)
URINE REFLEX TO CULTURE: NORMAL
UROBILINOGEN, URINE: 0.2 E.U./DL
WBC # BLD: 15.7 K/UL (ref 4.2–9)
WBC UA: NORMAL /HPF (ref 0–5)

## 2022-10-17 PROCEDURE — 6370000000 HC RX 637 (ALT 250 FOR IP): Performed by: EMERGENCY MEDICINE

## 2022-10-17 PROCEDURE — 87635 SARS-COV-2 COVID-19 AMP PRB: CPT

## 2022-10-17 PROCEDURE — 71045 X-RAY EXAM CHEST 1 VIEW: CPT

## 2022-10-17 PROCEDURE — 96375 TX/PRO/DX INJ NEW DRUG ADDON: CPT

## 2022-10-17 PROCEDURE — 85025 COMPLETE CBC W/AUTO DIFF WBC: CPT

## 2022-10-17 PROCEDURE — 80053 COMPREHEN METABOLIC PANEL: CPT

## 2022-10-17 PROCEDURE — 96374 THER/PROPH/DIAG INJ IV PUSH: CPT

## 2022-10-17 PROCEDURE — 81001 URINALYSIS AUTO W/SCOPE: CPT

## 2022-10-17 PROCEDURE — 6360000002 HC RX W HCPCS: Performed by: EMERGENCY MEDICINE

## 2022-10-17 PROCEDURE — 2580000003 HC RX 258: Performed by: EMERGENCY MEDICINE

## 2022-10-17 PROCEDURE — 85610 PROTHROMBIN TIME: CPT

## 2022-10-17 PROCEDURE — 83880 ASSAY OF NATRIURETIC PEPTIDE: CPT

## 2022-10-17 PROCEDURE — 99285 EMERGENCY DEPT VISIT HI MDM: CPT

## 2022-10-17 PROCEDURE — 84484 ASSAY OF TROPONIN QUANT: CPT

## 2022-10-17 PROCEDURE — 93005 ELECTROCARDIOGRAM TRACING: CPT

## 2022-10-17 PROCEDURE — 36415 COLL VENOUS BLD VENIPUNCTURE: CPT

## 2022-10-17 PROCEDURE — 83735 ASSAY OF MAGNESIUM: CPT

## 2022-10-17 RX ORDER — SODIUM CHLORIDE 0.9 % (FLUSH) 0.9 %
3 SYRINGE (ML) INJECTION EVERY 8 HOURS
Status: DISCONTINUED | OUTPATIENT
Start: 2022-10-17 | End: 2022-10-17 | Stop reason: HOSPADM

## 2022-10-17 RX ORDER — 0.9 % SODIUM CHLORIDE 0.9 %
500 INTRAVENOUS SOLUTION INTRAVENOUS ONCE
Status: COMPLETED | OUTPATIENT
Start: 2022-10-17 | End: 2022-10-17

## 2022-10-17 RX ORDER — ONDANSETRON 2 MG/ML
4 INJECTION INTRAMUSCULAR; INTRAVENOUS ONCE
Status: COMPLETED | OUTPATIENT
Start: 2022-10-17 | End: 2022-10-17

## 2022-10-17 RX ORDER — AZITHROMYCIN 250 MG/1
TABLET, FILM COATED ORAL
Qty: 1 PACKET | Refills: 0 | Status: SHIPPED | OUTPATIENT
Start: 2022-10-17 | End: 2022-10-21

## 2022-10-17 RX ORDER — KETOROLAC TROMETHAMINE 30 MG/ML
30 INJECTION, SOLUTION INTRAMUSCULAR; INTRAVENOUS ONCE
Status: COMPLETED | OUTPATIENT
Start: 2022-10-17 | End: 2022-10-17

## 2022-10-17 RX ORDER — DEXAMETHASONE SODIUM PHOSPHATE 10 MG/ML
8 INJECTION, SOLUTION INTRAMUSCULAR; INTRAVENOUS ONCE
Status: COMPLETED | OUTPATIENT
Start: 2022-10-17 | End: 2022-10-17

## 2022-10-17 RX ORDER — MAGNESIUM SULFATE 1 G/100ML
1000 INJECTION INTRAVENOUS ONCE
Status: DISCONTINUED | OUTPATIENT
Start: 2022-10-17 | End: 2022-10-17

## 2022-10-17 RX ORDER — LANOLIN ALCOHOL/MO/W.PET/CERES
800 CREAM (GRAM) TOPICAL DAILY
Status: DISCONTINUED | OUTPATIENT
Start: 2022-10-17 | End: 2022-10-17 | Stop reason: HOSPADM

## 2022-10-17 RX ADMIN — DEXAMETHASONE SODIUM PHOSPHATE 8 MG: 10 INJECTION INTRAMUSCULAR; INTRAVENOUS at 18:18

## 2022-10-17 RX ADMIN — SODIUM CHLORIDE 500 ML: 9 INJECTION, SOLUTION INTRAVENOUS at 16:50

## 2022-10-17 RX ADMIN — Medication 800 MG: at 18:26

## 2022-10-17 RX ADMIN — KETOROLAC TROMETHAMINE 30 MG: 30 INJECTION, SOLUTION INTRAMUSCULAR at 16:50

## 2022-10-17 RX ADMIN — ONDANSETRON 4 MG: 2 INJECTION INTRAMUSCULAR; INTRAVENOUS at 16:51

## 2022-10-17 ASSESSMENT — ENCOUNTER SYMPTOMS
STRIDOR: 0
BACK PAIN: 0
CONSTIPATION: 0
SINUS PAIN: 1
SORE THROAT: 0
COUGH: 1
EYE REDNESS: 0
CHOKING: 0
FACIAL SWELLING: 0
EYE PAIN: 0
BLOOD IN STOOL: 0
ABDOMINAL PAIN: 0
TROUBLE SWALLOWING: 0
DIARRHEA: 0
SHORTNESS OF BREATH: 1
CHEST TIGHTNESS: 0
SINUS PRESSURE: 0
VOICE CHANGE: 0
EYE DISCHARGE: 0
WHEEZING: 0
VOMITING: 0

## 2022-10-17 ASSESSMENT — PAIN DESCRIPTION - LOCATION: LOCATION: SHOULDER

## 2022-10-17 ASSESSMENT — PAIN SCALES - GENERAL
PAINLEVEL_OUTOF10: 1
PAINLEVEL_OUTOF10: 6

## 2022-10-17 ASSESSMENT — PAIN - FUNCTIONAL ASSESSMENT: PAIN_FUNCTIONAL_ASSESSMENT: 0-10

## 2022-10-17 NOTE — ED PROVIDER NOTES
[facility] ED  eMERGENCY dEPARTMENT eNCOUnter      Pt Name: Maria C Rogers  MRN: [id number]  [place] 1955  Date of evaluation: 10/17/2022  Provider: Isaiah Link MD    [address] [place] [address]       Chief Complaint   Patient presents with    Shortness of Breath     Positive covid         HISTORY OF PRESENT ILLNESS   (Location/Symptom, Timing/Onset,Context/Setting, Quality, Duration, Modifying Factors, Severity)  Note limiting factors. Maria C Rogers is a 74 y.o. male who presents to the emergency department patient come to the emergency because of sickness for the past 3 to 4 days time patient at home for COVID and came back positive so decided to come here to be checked out history diabetes mellitus non-smoker increasing cough congestion body ache fatigue tired weakness no appetite for the last 3 days as per patient no one is sick at home at this time patient stated that he lost appetite for last 2 days time    HPI    NursingNotes were reviewed. REVIEW OF SYSTEMS    (2-9 systems for level 4, 10 or more for level 5)     Review of Systems   Constitutional:  Positive for activity change, appetite change, chills, diaphoresis, fatigue and fever. HENT:  Positive for congestion, postnasal drip and sinus pain. Negative for drooling, facial swelling, mouth sores, nosebleeds, sinus pressure, sore throat, trouble swallowing and voice change. Eyes:  Negative for pain, discharge, redness and visual disturbance. Respiratory:  Positive for cough and shortness of breath. Negative for choking, chest tightness, wheezing and stridor. Cardiovascular:  Negative for chest pain, palpitations and leg swelling. Gastrointestinal:  Negative for abdominal pain, blood in stool, constipation, diarrhea and vomiting. Endocrine: Negative for cold intolerance, polyphagia and polyuria. Genitourinary:  Negative for dysuria, flank pain, frequency, genital sores and urgency.    Musculoskeletal:  Negative for back pain, joint swelling, neck pain and neck stiffness. Skin:  Negative for pallor and rash. Neurological:  Positive for weakness. Negative for tremors, seizures, syncope, numbness and headaches. Hematological:  Negative for adenopathy. Does not bruise/bleed easily. Psychiatric/Behavioral:  Negative for agitation, behavioral problems, hallucinations and sleep disturbance. The patient is not hyperactive. All other systems reviewed and are negative. Except as noted above the remainder of the review of systems was reviewed and negative. PAST MEDICAL HISTORY     Past Medical History:   Diagnosis Date    COPD (chronic obstructive pulmonary disease) ([facility]     Depression     Hodgkin disease ([facility]     Hyperlipidemia     Hypertension     MRSA infection 9/2014    Left Groin     Sleep apnea     [facility]     Type II or unspecified type diabetes mellitus without mention of complication, not stated as uncontrolled          SURGICALHISTORY       Past Surgical History:   Procedure Laterality Date    ABDOMEN SURGERY      hodgkin     CHOLECYSTECTOMY      COLONOSCOPY      COLONOSCOPY N/A 11/22/2016    COLONOSCOPY performed by Mare Montano MD at [facility]    COLONOSCOPY N/A 11/4/2021    COLORECTAL CANCER SCREENING, HIGH RISK WITH POLYPECTOMY performed by Mare Montano MD at [place] [address], COLON, DIAGNOSTIC      HERNIA REPAIR      umbilical    SPLENECTOMY, TOTAL      TOOTH EXTRACTION      TOTAL THYROIDECTOMY      CANCER         CURRENT MEDICATIONS       Previous Medications    AMLODIPINE-ATORVASTATATIN (CADUET) 10-20 MG PER TABLET    Take 1 tablet by mouth daily. ASPIRIN 81 MG EC TABLET    Take 81 mg by mouth daily    ATORVASTATIN (LIPITOR) 40 MG TABLET    Take 40 mg by mouth daily. BUPROPION SR (WELLBUTRIN SR) 150 MG SR TABLET    Take 150 mg by mouth 2 times daily.     CLONAZEPAM (KLONOPIN) 1 MG TABLET    Take 1 mg by mouth 2 times daily as needed    GABAPENTIN (NEURONTIN) 300 MG CAPSULE    Take 300 mg by mouth 3 times daily. INSULIN NPH (HUMULIN N;NOVOLIN N) 100 UNIT/ML INJECTION VIAL    Inject 30 Units into the skin 2 times daily (before meals)     INSULIN REGULAR (HUMULIN R;NOVOLIN R) 100 UNIT/ML INJECTION    Inject into the skin See Admin Instructions 10-15 units with meals    IPRATROPIUM-ALBUTEROL (COMBIVENT RESPIMAT IN)    Inhale  into the lungs. LEVOTHYROXINE (SYNTHROID) 175 MCG TABLET    Take 175 mcg by mouth Daily. LISINOPRIL (PRINIVIL;ZESTRIL) 10 MG TABLET    Take 10 mg by mouth daily. METOPROLOL (TOPROL-XL) 25 MG XL TABLET        PANTOPRAZOLE (PROTONIX) 20 MG TABLET    Take 20 mg by mouth daily. PHENTERMINE (ADIPEX-P) 37.5 MG TABLET    Take 37.5 mg by mouth every morning (before breakfast).     SODIUM BICARBONATE 650 MG TABLET    Take 650 mg by mouth 2 times daily    TAMSULOSIN (FLOMAX) 0.4 MG CAPSULE    Take 0.4 mg by mouth daily       ALLERGIES     Metformin and related and Nateglinide    FAMILY HISTORY       Family History   Problem Relation Age of Onset    Stroke Mother     Heart Disease Father     Colon Cancer Neg Hx           SOCIAL HISTORY       Social History     Socioeconomic History    Marital status:      Spouse name: None    Number of children: None    Years of education: None    Highest education level: None   Occupational History     Comment: exposures to diesel fuels   Tobacco Use    Smoking status: Former     Packs/day: 2.00     Years: 35.00     Pack years: 70.00     Types: Cigarettes     Start date:      Quit date:      Years since quittin.8    Smokeless tobacco: Never   Vaping Use    Vaping Use: Never used   Substance and Sexual Activity    Alcohol use: No    Drug use: No       SCREENINGS    Jordan Coma Scale  Eye Opening: Spontaneous  Best Verbal Response: Oriented  Best Motor Response: Obeys commands  Jordan Coma Scale Score: 15 @FLOW([id number])@      PHYSICAL EXAM    (up to 7 for level 4, 8 or more for level 5)     ED Triage Vitals [10/17/22 1618]   BP Temp Temp src Heart Rate Resp SpO2 Height Weight   (!) 158/72 99.6 °F (37.6 °C) -- 91 18 94 % 5' 10\" (1.778 m) 240 lb (108.9 kg)       Physical Exam  Vitals and nursing note reviewed. Constitutional:       General: He is not in acute distress. Appearance: He is obese. He is not ill-appearing, toxic-appearing or diaphoretic. Interventions: He is not intubated. Comments: Alert cooperative patient nontoxic appearance ambulatory otherwise tired and weak look low-grade fever noted talks in full sentence   HENT:      Head: Normocephalic and atraumatic. Mouth/Throat:      Pharynx: No pharyngeal swelling or oropharyngeal exudate. Eyes:      Extraocular Movements: Extraocular movements intact. Pupils: Pupils are equal, round, and reactive to light. Neck:      Thyroid: No thyromegaly. Vascular: No hepatojugular reflux or JVD. Trachea: No tracheal deviation. Cardiovascular:      Rate and Rhythm: Normal rate and regular rhythm. No extrasystoles are present. Pulses: Normal pulses. No decreased pulses. Heart sounds: Normal heart sounds. No murmur heard. No friction rub. Pulmonary:      Effort: No tachypnea, bradypnea, accessory muscle usage or respiratory distress. He is not intubated. Breath sounds: No stridor. No decreased breath sounds, wheezing, rhonchi or rales. Chest:      Chest wall: No mass, deformity, tenderness, crepitus or edema. There is no dullness to percussion. Musculoskeletal:      Right lower leg: No tenderness. No edema. Left lower leg: No tenderness. No edema. Lymphadenopathy:      Cervical: No cervical adenopathy. Skin:     Coloration: Skin is not cyanotic or pale. Findings: No ecchymosis, erythema or rash. Nails: There is no clubbing. Neurological:      Mental Status: He is alert and oriented to person, place, and time. Cranial Nerves: No cranial nerve deficit. Motor: No weakness. Psychiatric:         Mood and Affect: Mood normal.       DIAGNOSTIC RESULTS     EKG: All EKG's are interpreted by the Emergency Department Physician who either signs or Co-signsthis chart in the absence of a cardiologist.        RADIOLOGY:   [place] such as CT, Ultrasound and MRI are read by the radiologist. Plain radiographic images are visualized and preliminarily interpreted by the emergency physician with the below findings:        Interpretation per the Radiologist below, if available at the time ofthis note:    XR CHEST PORTABLE   Final Result   Mild bronchovascular prominence but no evidence of focal infiltrate or   consolidation.                ED BEDSIDE ULTRASOUND:   Performed by ED Physician - none    LABS:  Labs Reviewed   COVID-19, RAPID - Abnormal; Notable for the following components:       Result Value    SARS-CoV-2, NAAT DETECTED (*)     All other components within normal limits   COMPREHENSIVE METABOLIC PANEL - Abnormal; Notable for the following components:    Sodium 134 (*)     Glucose 134 (*)     Creatinine 1.44 (*)     Est, Glom Filt Rate 53.7 (*)     GFR  59.5 (*)     Total Bilirubin 0.9 (*)     Alkaline Phosphatase 167 (*)     AST 50 (*)     All other components within normal limits   CBC WITH AUTO DIFFERENTIAL - Abnormal; Notable for the following components:    WBC 15.7 (*)     RBC 4.35 (*)     Hemoglobin 12.1 (*)     Hematocrit 37.1 (*)     Platelets 878 (*)     Neutrophils % 68.0 (*)     Monocytes % 16.0 (*)     Basophils % 1.3 (*)     Neutrophils Absolute 10.7 (*)     Monocytes Absolute 2.5 (*)     Eosinophils Absolute 1.1 (*)     All other components within normal limits   MAGNESIUM - Abnormal; Notable for the following components:    Magnesium 1.5 (*)     All other components within normal limits   TROPONIN   BRAIN NATRIURETIC PEPTIDE   PROTIME-INR   URINALYSIS WITH REFLEX TO CULTURE       All other labs were within normal range or not returned as of this dictation. EMERGENCY DEPARTMENT COURSE and DIFFERENTIAL DIAGNOSIS/MDM:   Vitals:    Vitals:    10/17/22 1618 10/17/22 1740   BP: (!) 158/72 (!) 116/51   Pulse: 91 79   Resp: 18 18   Temp: 99.6 °F (37.6 °C) 98.6 °F (37 °C)   SpO2: 94% 94%   Weight: 240 lb (108.9 kg)    Height: 5' 10\" (1.778 m)        MDM  Number of Diagnoses or Management Options  COVID-19 virus infection: established and improving  Febrile illness, acute: established and improving  Diagnosis management comments: Patient did receive COVID vaccination with booster no one sick at home at this time sick for the past 3 to 4 days time with fever chills cough congestion lost appetite generalized weakness body ache flulike symptoms patient did perform rapid COVID testing at home which came back positive so came here for evaluation patient EKG performed arrival normal sinus rhythm first-degree AV block has a rate of 89/min VA interval 2 1 6 ms QRS duration 102 ms and QT interval 384 ms has no ischemia no ST elevation on EKG no PVCs noted and hydration magnesium was also given magnesium replenishment patient feeling better at this time will be going home with follow-up with her primary care physician patient rested very well       Amount and/or Complexity of Data Reviewed  Clinical lab tests: ordered and reviewed  Tests in the radiology section of CPT®: ordered and reviewed        CRITICAL CARE TIME   Total Critical Care time was  minutes, excluding separately reportableprocedures. There was a high probability of clinicallysignificant/life threatening deterioration in the patient's condition which required my urgent intervention. CONSULTS:  None    PROCEDURES:  Unless otherwise noted below, none     Procedures    FINAL IMPRESSION      1. Febrile illness, acute    2. COVID-19 virus infection          DISPOSITION/PLAN   DISPOSITION        PATIENT REFERRED TO:  No follow-up provider specified.     DISCHARGE MEDICATIONS:  New Prescriptions    No medications on file          (Please note that portions of this note were completed with a voice recognition program.  Efforts were made to edit the dictations but occasionally words are mis-transcribed.)    Jessy Martino MD (electronically signed)  Attending Emergency Physician        Jessy Martino MD  10/17/22 [id number]

## 2022-10-19 LAB
EKG ATRIAL RATE: 89 BPM
EKG P AXIS: 61 DEGREES
EKG P-R INTERVAL: 216 MS
EKG Q-T INTERVAL: 384 MS
EKG QRS DURATION: 102 MS
EKG QTC CALCULATION (BAZETT): 467 MS
EKG R AXIS: 7 DEGREES
EKG T AXIS: 34 DEGREES
EKG VENTRICULAR RATE: 89 BPM

## 2023-11-02 PROBLEM — R01.1 SYSTOLIC MURMUR: Status: ACTIVE | Noted: 2023-11-02

## 2023-11-02 PROBLEM — E78.5 HYPERLIPIDEMIA: Status: ACTIVE | Noted: 2023-11-02

## 2023-11-02 PROBLEM — R07.9 CHEST PAIN: Status: ACTIVE | Noted: 2023-11-02

## 2023-11-02 PROBLEM — J44.9 CHRONIC OBSTRUCTIVE PULMONARY DISEASE (MULTI): Status: ACTIVE | Noted: 2023-11-02

## 2023-11-02 PROBLEM — R09.89 BILATERAL CAROTID BRUITS: Status: ACTIVE | Noted: 2023-11-02

## 2023-11-02 PROBLEM — I35.0 NONRHEUMATIC AORTIC VALVE STENOSIS: Status: ACTIVE | Noted: 2023-11-02

## 2023-11-02 PROBLEM — R06.02 SHORTNESS OF BREATH ON EXERTION: Status: ACTIVE | Noted: 2023-11-02

## 2023-11-02 PROBLEM — I35.1 NONRHEUMATIC AORTIC VALVE INSUFFICIENCY: Status: ACTIVE | Noted: 2023-11-02

## 2023-11-02 PROBLEM — E11.9 TYPE II DIABETES MELLITUS (MULTI): Status: ACTIVE | Noted: 2023-11-02

## 2023-11-02 PROBLEM — M25.532 LEFT WRIST PAIN: Status: ACTIVE | Noted: 2023-11-02

## 2023-11-02 PROBLEM — M65.4 TENOSYNOVITIS, DE QUERVAIN: Status: ACTIVE | Noted: 2023-11-02

## 2023-11-02 PROBLEM — E03.9 HYPOTHYROIDISM: Status: ACTIVE | Noted: 2023-11-02

## 2023-11-02 PROBLEM — M65.839 INTERSECTION SYNDROME OF WRIST: Status: ACTIVE | Noted: 2023-11-02

## 2023-11-02 PROBLEM — J93.12 SECONDARY SPONTANEOUS PNEUMOTHORAX: Status: ACTIVE | Noted: 2023-11-02

## 2023-11-02 PROBLEM — E66.9 OBESITY, UNSPECIFIED: Status: ACTIVE | Noted: 2023-11-02

## 2023-11-02 PROBLEM — I70.1 RENAL ARTERY STENOSIS (CMS-HCC): Status: ACTIVE | Noted: 2023-11-02

## 2023-11-02 RX ORDER — LISINOPRIL 10 MG/1
1 TABLET ORAL DAILY
COMMUNITY
Start: 2020-12-14

## 2023-11-02 RX ORDER — METOPROLOL SUCCINATE 25 MG/1
1 TABLET, EXTENDED RELEASE ORAL DAILY
COMMUNITY
Start: 2020-12-14

## 2023-11-02 RX ORDER — CLONAZEPAM 0.5 MG/1
1 TABLET ORAL 2 TIMES DAILY PRN
COMMUNITY
Start: 2020-12-07

## 2023-11-02 RX ORDER — TAMSULOSIN HYDROCHLORIDE 0.4 MG/1
0.4 CAPSULE ORAL DAILY
COMMUNITY
Start: 2021-01-05

## 2023-11-02 RX ORDER — ATORVASTATIN CALCIUM 40 MG/1
1 TABLET, FILM COATED ORAL DAILY
COMMUNITY
Start: 2020-10-29 | End: 2023-11-20 | Stop reason: SDUPTHER

## 2023-11-02 RX ORDER — BUPROPION HYDROCHLORIDE 150 MG/1
TABLET, EXTENDED RELEASE ORAL 2 TIMES DAILY
COMMUNITY
Start: 2020-12-21

## 2023-11-02 RX ORDER — LEVOTHYROXINE SODIUM 175 UG/1
TABLET ORAL
COMMUNITY
Start: 2020-12-09

## 2023-11-02 RX ORDER — GABAPENTIN 300 MG/1
1 CAPSULE ORAL 3 TIMES DAILY
COMMUNITY
Start: 2020-11-05

## 2023-11-02 RX ORDER — NAPROXEN SODIUM 220 MG/1
1 TABLET, FILM COATED ORAL DAILY
COMMUNITY

## 2023-11-14 ENCOUNTER — ANCILLARY PROCEDURE (OUTPATIENT)
Dept: CARDIOLOGY | Facility: CLINIC | Age: 68
End: 2023-11-14
Payer: MEDICARE

## 2023-11-14 ENCOUNTER — CLINICAL SUPPORT (OUTPATIENT)
Dept: CARDIOLOGY | Facility: CLINIC | Age: 68
End: 2023-11-14
Payer: MEDICARE

## 2023-11-14 DIAGNOSIS — R09.89 BILATERAL CAROTID BRUITS: ICD-10-CM

## 2023-11-14 DIAGNOSIS — R01.1 SYSTOLIC MURMUR: ICD-10-CM

## 2023-11-14 LAB
AORTIC VALVE MEAN GRADIENT: 20
AORTIC VALVE PEAK VELOCITY: 2.96
AV PEAK GRADIENT: 35
AVA (PEAK VEL): 1
AVA (VTI): 1.08
EJECTION FRACTION APICAL 4 CHAMBER: 53.3
EJECTION FRACTION: 52
LEFT VENTRICLE INTERNAL DIMENSION DIASTOLE: 5.02 (ref 3.5–6)
LEFT VENTRICULAR OUTFLOW TRACT DIAMETER: 1.9
MITRAL VALVE E/A RATIO: 1.25
MITRAL VALVE E/E' RATIO: 13.1
RIGHT VENTRICLE PEAK SYSTOLIC PRESSURE: 37.1

## 2023-11-14 PROCEDURE — 93306 TTE W/DOPPLER COMPLETE: CPT | Performed by: INTERNAL MEDICINE

## 2023-11-14 PROCEDURE — 2500000004 HC RX 250 GENERAL PHARMACY W/ HCPCS (ALT 636 FOR OP/ED): Performed by: INTERNAL MEDICINE

## 2023-11-14 PROCEDURE — 93880 EXTRACRANIAL BILAT STUDY: CPT | Performed by: INTERNAL MEDICINE

## 2023-11-14 PROCEDURE — 93306 TTE W/DOPPLER COMPLETE: CPT

## 2023-11-14 PROCEDURE — 93880 EXTRACRANIAL BILAT STUDY: CPT

## 2023-11-14 RX ADMIN — HUMAN ALBUMIN MICROSPHERES AND PERFLUTREN 0.5 ML: 10; .22 INJECTION, SOLUTION INTRAVENOUS at 08:37

## 2023-11-20 ENCOUNTER — OFFICE VISIT (OUTPATIENT)
Dept: CARDIOLOGY | Facility: CLINIC | Age: 68
End: 2023-11-20
Payer: MEDICARE

## 2023-11-20 VITALS
WEIGHT: 254 LBS | HEIGHT: 70 IN | HEART RATE: 75 BPM | SYSTOLIC BLOOD PRESSURE: 126 MMHG | DIASTOLIC BLOOD PRESSURE: 56 MMHG | BODY MASS INDEX: 36.36 KG/M2

## 2023-11-20 DIAGNOSIS — I35.1 NONRHEUMATIC AORTIC VALVE INSUFFICIENCY: ICD-10-CM

## 2023-11-20 DIAGNOSIS — E11.9 TYPE 2 DIABETES MELLITUS WITHOUT COMPLICATION, WITH LONG-TERM CURRENT USE OF INSULIN (MULTI): ICD-10-CM

## 2023-11-20 DIAGNOSIS — E78.2 MIXED HYPERLIPIDEMIA: ICD-10-CM

## 2023-11-20 DIAGNOSIS — I35.0 NONRHEUMATIC AORTIC VALVE STENOSIS: Primary | ICD-10-CM

## 2023-11-20 DIAGNOSIS — Z79.4 TYPE 2 DIABETES MELLITUS WITHOUT COMPLICATION, WITH LONG-TERM CURRENT USE OF INSULIN (MULTI): ICD-10-CM

## 2023-11-20 DIAGNOSIS — J44.9 CHRONIC OBSTRUCTIVE PULMONARY DISEASE, UNSPECIFIED COPD TYPE (MULTI): ICD-10-CM

## 2023-11-20 DIAGNOSIS — R09.89 BILATERAL CAROTID BRUITS: ICD-10-CM

## 2023-11-20 PROCEDURE — 1036F TOBACCO NON-USER: CPT | Performed by: INTERNAL MEDICINE

## 2023-11-20 PROCEDURE — 99214 OFFICE O/P EST MOD 30 MIN: CPT | Performed by: INTERNAL MEDICINE

## 2023-11-20 PROCEDURE — 3008F BODY MASS INDEX DOCD: CPT | Performed by: INTERNAL MEDICINE

## 2023-11-20 PROCEDURE — 3074F SYST BP LT 130 MM HG: CPT | Performed by: INTERNAL MEDICINE

## 2023-11-20 PROCEDURE — 1159F MED LIST DOCD IN RCRD: CPT | Performed by: INTERNAL MEDICINE

## 2023-11-20 PROCEDURE — 3078F DIAST BP <80 MM HG: CPT | Performed by: INTERNAL MEDICINE

## 2023-11-20 PROCEDURE — 4010F ACE/ARB THERAPY RXD/TAKEN: CPT | Performed by: INTERNAL MEDICINE

## 2023-11-20 RX ORDER — ATORVASTATIN CALCIUM 40 MG/1
40 TABLET, FILM COATED ORAL DAILY
Qty: 90 TABLET | Refills: 3 | Status: SHIPPED | OUTPATIENT
Start: 2023-11-20 | End: 2024-11-19

## 2023-11-20 RX ORDER — ESCITALOPRAM OXALATE 10 MG/1
5 TABLET ORAL DAILY
COMMUNITY
Start: 2023-09-19

## 2023-11-20 RX ORDER — IPRATROPIUM BROMIDE AND ALBUTEROL SULFATE 2.5; .5 MG/3ML; MG/3ML
3 SOLUTION RESPIRATORY (INHALATION) 3 TIMES DAILY
COMMUNITY
Start: 2022-11-16

## 2023-11-20 RX ORDER — INSULIN ASPART 100 [IU]/ML
30 INJECTION, SOLUTION INTRAVENOUS; SUBCUTANEOUS
COMMUNITY
Start: 2023-02-16

## 2023-11-20 RX ORDER — INSULIN DEGLUDEC 200 U/ML
54 INJECTION, SOLUTION SUBCUTANEOUS
COMMUNITY
Start: 2023-02-16

## 2023-11-20 NOTE — PROGRESS NOTES
Patient:  Miguel A Bojorquez  YOB: 1955  MRN: [id number]       HPI:       Miguel A Bojorquez is a 68 y.o. male who returns today for cardiac follow-up.   He underwent evaluation on October 15, 2021 for exertional shortness of breath. Remote cardiac catheterization in August 2000 showed a 50 to 60% distal circumflex lesion with otherwise mild disease. There was normal LV systolic function. An echocardiogram showed an estimated LV ejection of 60%. There was minimal calcific aortic stenosis with peak velocity across the aortic valve of 2.1 m/s. There was 1-2+ aortic regurgitation. He has a history of COPD related to prior tobacco abuse. He was hospitalized in the past for 2 separate spontaneous pneumothoraces. He has hyperlipidemia, essential hypertension, and type 2 diabetes mellitus. He quit smoking more than 15 years ago.    A Lexiscan myocardial perfusion study on November 22, 2021 was negative for ischemia or infarction. Calculated LV ejection fraction of 57%. A follow-up echocardiogram September 16, 2023 showed an estimated LV ejection fraction 55 to 60%.  There was moderate LVH.  Peak velocity across aortic valve 3.0 m/s with a peak gradient 36 mmHg and mean gradient 20 mm.  The aortic valve area 1.0 with dimensionless ratio 0.38.  Mild (1-2+) aortic regurgitation.  There was mild (1+) mitral regurgitation.  Carotid ultrasound done the same day showed plaque bilaterally with less than 50% stenosis in the left and right internal carotid arteries.    He continues to do well since his last visit. He denies any chest discomfort. He has chronic mild exertional shortness of breath. He denies any orthopnea, PND, or increasing peripheral edema. He denies any palpitations, lightheadedness, near-syncope, or syncope. He denies any fever, chills, or cough. He denies any nausea, vomiting, or diaphoresis. He denies any hemoptysis, hematemesis, melena, or hematochezia. Lab studies dated November 3, 2022 showed a normal  comprehensive metabolic profile. LDL cholesterol was 88. His atorvastatin dose was recently increased. EKG November 2021 showed normal sinus rhythm, normal tracing. He was advised to take ASA 81 mg daily.  Other details as noted below.    The above portion of this note was dictated by me using voice recognition software. I personally performed the services described in the documentation. The scribe entering the documentation below was in my presence. I affirm that the information is both accurate and complete.       Objective:     There were no vitals filed for this visit.    Wt Readings from Last 4 Encounters:   01/03/23 115 kg (253 lb 8.5 oz)   11/21/22 118 kg (260 lb)   11/22/21 115 kg (253 lb)   10/15/21 112 kg (246 lb)       Allergies:     Allergies   Allergen Reactions    Doxycycline Unknown    Enoxaparin Unknown    Fenofibrate Unknown    Metformin Other    Nateglinide Unknown        Medications:     Current Outpatient Medications   Medication Instructions    aspirin 81 mg chewable tablet 1 tablet, oral, Daily    atorvastatin (Lipitor) 40 mg tablet 1 tablet, oral, Daily    buPROPion SR (Wellbutrin SR) 150 mg 12 hr tablet oral, 2 times daily    clonazePAM (KlonoPIN) 0.5 mg tablet 1 tablet, oral, 2 times daily PRN    escitalopram (LEXAPRO) 5 mg, oral, Daily    gabapentin (Neurontin) 300 mg capsule 1 capsule, 3 times daily    insulin degludec (TRESIBA FLEXTOUCH) 54 Units, subcutaneous    ipratropium-albuteroL (Duo-Neb) 0.5-2.5 mg/3 mL nebulizer solution 3 mL, nebulization, 3 times daily    levothyroxine (Synthroid, Levoxyl) 175 mcg tablet TAKE 1 TABLET MONDAY THROUGH SATURDAY    lisinopril 10 mg tablet 1 tablet, Daily    metoprolol succinate XL (Toprol-XL) 25 mg 24 hr tablet 1 tablet, Daily    NovoLOG Flexpen U-100 Insulin 30 Units, subcutaneous    tamsulosin (FLOMAX) 0.4 mg, Daily       Physical Examination:   GENERAL:  Well developed, well nourished, in no acute distress.  CHEST:  Symmetric and  nontender.  NEURO/PSYCH:  Alert and oriented times three with approppriate behavior and responses.  NECK:  Supple, no JVD, no bruit.  LUNGS:  Clear to auscultation bilaterally, normal respiratory effort.  HEART:  Rate and rhythm regular with II/VI TOSHIA RUSB, no gallop appreciated.        There are no rubs, clicks or heaves.  EXTREMITIES:  Warm with good color, no clubbing or cyanosis.  There is no edema noted.  PERIPHERAL VASCULAR:  Pulses present and equally palpable; 2+ throughout.        Diagnostic Studies:     === 11/11/21 ===    - Impression -  Normal Lexiscan Myoview cardiac perfusion stress test.  No evidence of ischemia or myocardial infarction by perfusion imaging.  Normal left ventricular systolic function, ejection fraction 57%.  Abnormal resting electrocardiogram is noted.  Comparison to study of May 2010 is unchanged.  Clinical correlation is advised..      Problem List:     Patient Active Problem List   Diagnosis    Bilateral carotid bruits    Chest pain    Hyperlipidemia    Chronic obstructive pulmonary disease (CMS/HCC)    Hypothyroidism    Left wrist pain    Nonrheumatic aortic valve insufficiency    Nonrheumatic aortic valve stenosis    Obesity, unspecified    Renal artery stenosis (CMS/HCC)    Secondary spontaneous pneumothorax    Shortness of breath on exertion    Systolic murmur    Intersection syndrome of wrist    Tenosynovitis, de Quervain    Type II diabetes mellitus (CMS/HCC)       Asessment:     Problem List Items Addressed This Visit             ICD-10-CM    Bilateral carotid bruits R09.89    Relevant Orders    Follow Up In Cardiology    Hyperlipidemia E78.5    Relevant Medications    atorvastatin (Lipitor) 40 mg tablet    Other Relevant Orders    Follow Up In Cardiology    Chronic obstructive pulmonary disease (CMS/HCC) J44.9    Relevant Orders    Follow Up In Cardiology    Nonrheumatic aortic valve insufficiency I35.1    Relevant Orders    Transthoracic Echo (TTE) Complete    Follow Up In  Cardiology    Nonrheumatic aortic valve stenosis - Primary I35.0    Relevant Orders    Transthoracic Echo (TTE) Complete    Follow Up In Cardiology    Type II diabetes mellitus (CMS/HCC) E11.9    Relevant Orders    Follow Up In Cardiology

## 2024-05-13 ENCOUNTER — TRANSCRIBE ORDERS (OUTPATIENT)
Dept: ADMINISTRATIVE | Age: 69
End: 2024-05-13

## 2024-05-13 DIAGNOSIS — Z87.891 PERSONAL HISTORY OF TOBACCO USE, PRESENTING HAZARDS TO HEALTH: Primary | ICD-10-CM

## 2024-05-13 DIAGNOSIS — F17.211 CIGARETTE NICOTINE DEPENDENCE IN REMISSION: ICD-10-CM

## 2024-05-24 ENCOUNTER — HOSPITAL ENCOUNTER (OUTPATIENT)
Dept: CT IMAGING | Age: 69
End: 2024-05-24
Payer: MEDICARE

## 2024-05-24 DIAGNOSIS — Z87.891 PERSONAL HISTORY OF TOBACCO USE, PRESENTING HAZARDS TO HEALTH: ICD-10-CM

## 2024-05-24 DIAGNOSIS — F17.211 CIGARETTE NICOTINE DEPENDENCE IN REMISSION: ICD-10-CM

## 2024-05-24 PROCEDURE — 71271 CT THORAX LUNG CANCER SCR C-: CPT

## 2024-07-19 ENCOUNTER — HOSPITAL ENCOUNTER (INPATIENT)
Age: 69
LOS: 2 days | Discharge: HOME | DRG: 176 | End: 2024-07-21
Attending: STUDENT IN AN ORGANIZED HEALTH CARE EDUCATION/TRAINING PROGRAM | Admitting: INTERNAL MEDICINE
Payer: MEDICARE

## 2024-07-19 ENCOUNTER — HOSPITAL ENCOUNTER (OUTPATIENT)
Dept: CT IMAGING | Age: 69
End: 2024-07-19
Attending: INTERNAL MEDICINE
Payer: MEDICARE

## 2024-07-19 ENCOUNTER — HOSPITAL ENCOUNTER (OUTPATIENT)
Dept: LAB | Age: 69
Discharge: HOME | End: 2024-07-19
Attending: INTERNAL MEDICINE
Payer: MEDICARE

## 2024-07-19 ENCOUNTER — TRANSCRIBE ORDERS (OUTPATIENT)
Dept: ADMINISTRATIVE | Age: 69
End: 2024-07-19

## 2024-07-19 DIAGNOSIS — R04.2 COUGHING UP BLOOD: Primary | ICD-10-CM

## 2024-07-19 DIAGNOSIS — R05.9 COUGH, UNSPECIFIED TYPE: ICD-10-CM

## 2024-07-19 DIAGNOSIS — I26.99 OTHER ACUTE PULMONARY EMBOLISM WITHOUT ACUTE COR PULMONALE (HCC): Primary | ICD-10-CM

## 2024-07-19 DIAGNOSIS — R91.8 LUNG MASS: ICD-10-CM

## 2024-07-19 DIAGNOSIS — R06.09 DYSPNEA ON EXERTION: ICD-10-CM

## 2024-07-19 DIAGNOSIS — R04.2 COUGHING UP BLOOD: ICD-10-CM

## 2024-07-19 DIAGNOSIS — I26.99 PE (PULMONARY THROMBOEMBOLISM) (HCC): ICD-10-CM

## 2024-07-19 LAB
ALBUMIN SERPL-MCNC: 3.8 G/DL (ref 3.5–4.6)
ALP SERPL-CCNC: 150 U/L (ref 35–104)
ALT SERPL-CCNC: 34 U/L (ref 0–41)
ANION GAP SERPL CALCULATED.3IONS-SCNC: 12 MEQ/L (ref 9–15)
ANISOCYTOSIS BLD QL SMEAR: ABNORMAL
APTT PPP: 27.4 SEC (ref 24.4–36.8)
AST SERPL-CCNC: 32 U/L (ref 0–40)
BASOPHILS # BLD: 0 K/UL (ref 0–0.2)
BASOPHILS NFR BLD: 1.4 %
BILIRUB SERPL-MCNC: 0.4 MG/DL (ref 0.2–0.7)
BNP BLD-MCNC: 448 PG/ML
BUN SERPL-MCNC: 34 MG/DL (ref 8–23)
CALCIUM SERPL-MCNC: 8.5 MG/DL (ref 8.5–9.9)
CHLORIDE SERPL-SCNC: 104 MEQ/L (ref 95–107)
CO2 SERPL-SCNC: 20 MEQ/L (ref 20–31)
CREAT SERPL-MCNC: 1.67 MG/DL (ref 0.7–1.2)
CREAT SERPL-MCNC: 1.76 MG/DL (ref 0.7–1.2)
EOSINOPHIL # BLD: 1.9 K/UL (ref 0–0.7)
EOSINOPHIL NFR BLD: 11 %
ERYTHROCYTE [DISTWIDTH] IN BLOOD BY AUTOMATED COUNT: 15.9 % (ref 11.5–14.5)
GLOBULIN SER CALC-MCNC: 2.5 G/DL (ref 2.3–3.5)
GLUCOSE BLD-MCNC: 105 MG/DL (ref 70–99)
GLUCOSE BLD-MCNC: 119 MG/DL (ref 70–99)
GLUCOSE SERPL-MCNC: 121 MG/DL (ref 70–99)
HCT VFR BLD AUTO: 36 % (ref 42–52)
HGB BLD-MCNC: 11.4 G/DL (ref 14–18)
INR PPP: 1.1
LYMPHOCYTES # BLD: 4.2 K/UL (ref 1–4.8)
LYMPHOCYTES NFR BLD: 24 %
MAGNESIUM SERPL-MCNC: 1.8 MG/DL (ref 1.7–2.4)
MCH RBC QN AUTO: 26.1 PG (ref 27–31.3)
MCHC RBC AUTO-ENTMCNC: 31.7 % (ref 33–37)
MCV RBC AUTO: 82.6 FL (ref 79–92.2)
MONOCYTES # BLD: 0.7 K/UL (ref 0.2–0.8)
MONOCYTES NFR BLD: 3.8 %
NEUTROPHILS # BLD: 11 K/UL (ref 1.4–6.5)
NEUTS SEG NFR BLD: 62 %
PERFORMED ON: ABNORMAL
PERFORMED ON: ABNORMAL
PLATELET # BLD AUTO: 381 K/UL (ref 130–400)
PLATELET BLD QL SMEAR: ADEQUATE
POTASSIUM SERPL-SCNC: 5.3 MEQ/L (ref 3.4–4.9)
PROT SERPL-MCNC: 6.3 G/DL (ref 6.3–8)
PROTHROMBIN TIME: 14 SEC (ref 12.3–14.9)
RBC # BLD AUTO: 4.36 M/UL (ref 4.7–6.1)
SODIUM SERPL-SCNC: 136 MEQ/L (ref 135–144)
TROPONIN, HIGH SENSITIVITY: 28 NG/L (ref 0–19)
TROPONIN, HIGH SENSITIVITY: 29 NG/L (ref 0–19)
WBC # BLD AUTO: 17.7 K/UL (ref 4.8–10.8)

## 2024-07-19 PROCEDURE — 93005 ELECTROCARDIOGRAM TRACING: CPT | Performed by: STUDENT IN AN ORGANIZED HEALTH CARE EDUCATION/TRAINING PROGRAM

## 2024-07-19 PROCEDURE — 80053 COMPREHEN METABOLIC PANEL: CPT

## 2024-07-19 PROCEDURE — 2060000000 HC ICU INTERMEDIATE R&B

## 2024-07-19 PROCEDURE — 36415 COLL VENOUS BLD VENIPUNCTURE: CPT

## 2024-07-19 PROCEDURE — 71275 CT ANGIOGRAPHY CHEST: CPT

## 2024-07-19 PROCEDURE — 6360000002 HC RX W HCPCS: Performed by: INTERNAL MEDICINE

## 2024-07-19 PROCEDURE — 85610 PROTHROMBIN TIME: CPT

## 2024-07-19 PROCEDURE — 83880 ASSAY OF NATRIURETIC PEPTIDE: CPT

## 2024-07-19 PROCEDURE — 2580000003 HC RX 258: Performed by: INTERNAL MEDICINE

## 2024-07-19 PROCEDURE — 84484 ASSAY OF TROPONIN QUANT: CPT

## 2024-07-19 PROCEDURE — 85025 COMPLETE CBC W/AUTO DIFF WBC: CPT

## 2024-07-19 PROCEDURE — 6360000004 HC RX CONTRAST MEDICATION: Performed by: INTERNAL MEDICINE

## 2024-07-19 PROCEDURE — 83735 ASSAY OF MAGNESIUM: CPT

## 2024-07-19 PROCEDURE — 85730 THROMBOPLASTIN TIME PARTIAL: CPT

## 2024-07-19 PROCEDURE — 6370000000 HC RX 637 (ALT 250 FOR IP): Performed by: INTERNAL MEDICINE

## 2024-07-19 PROCEDURE — 99285 EMERGENCY DEPT VISIT HI MDM: CPT

## 2024-07-19 RX ORDER — INSULIN LISPRO 100 [IU]/ML
0-4 INJECTION, SOLUTION INTRAVENOUS; SUBCUTANEOUS
Status: DISCONTINUED | OUTPATIENT
Start: 2024-07-19 | End: 2024-07-21 | Stop reason: HOSPADM

## 2024-07-19 RX ORDER — CYANOCOBALAMIN/FOLIC AC/VIT B6 1-2.5-25MG
1 TABLET ORAL DAILY
COMMUNITY

## 2024-07-19 RX ORDER — CLONAZEPAM 0.5 MG/1
0.5 TABLET ORAL 2 TIMES DAILY PRN
Status: DISCONTINUED | OUTPATIENT
Start: 2024-07-19 | End: 2024-07-21 | Stop reason: HOSPADM

## 2024-07-19 RX ORDER — ALBUTEROL SULFATE 90 UG/1
2 AEROSOL, METERED RESPIRATORY (INHALATION) EVERY 4 HOURS PRN
COMMUNITY

## 2024-07-19 RX ORDER — SODIUM CHLORIDE 0.9 % (FLUSH) 0.9 %
5-40 SYRINGE (ML) INJECTION PRN
Status: DISCONTINUED | OUTPATIENT
Start: 2024-07-19 | End: 2024-07-21 | Stop reason: HOSPADM

## 2024-07-19 RX ORDER — ONDANSETRON 4 MG/1
4 TABLET, ORALLY DISINTEGRATING ORAL EVERY 8 HOURS PRN
Status: DISCONTINUED | OUTPATIENT
Start: 2024-07-19 | End: 2024-07-21 | Stop reason: HOSPADM

## 2024-07-19 RX ORDER — ACETAMINOPHEN 650 MG/1
650 SUPPOSITORY RECTAL EVERY 6 HOURS PRN
Status: DISCONTINUED | OUTPATIENT
Start: 2024-07-19 | End: 2024-07-21 | Stop reason: HOSPADM

## 2024-07-19 RX ORDER — METOPROLOL SUCCINATE 25 MG/1
25 TABLET, EXTENDED RELEASE ORAL DAILY
Status: DISCONTINUED | OUTPATIENT
Start: 2024-07-20 | End: 2024-07-21 | Stop reason: HOSPADM

## 2024-07-19 RX ORDER — POTASSIUM CHLORIDE 20 MEQ/1
40 TABLET, EXTENDED RELEASE ORAL PRN
Status: DISCONTINUED | OUTPATIENT
Start: 2024-07-19 | End: 2024-07-21 | Stop reason: HOSPADM

## 2024-07-19 RX ORDER — TAMSULOSIN HYDROCHLORIDE 0.4 MG/1
0.4 CAPSULE ORAL DAILY
Status: DISCONTINUED | OUTPATIENT
Start: 2024-07-20 | End: 2024-07-21 | Stop reason: HOSPADM

## 2024-07-19 RX ORDER — ONDANSETRON 2 MG/ML
4 INJECTION INTRAMUSCULAR; INTRAVENOUS EVERY 6 HOURS PRN
Status: DISCONTINUED | OUTPATIENT
Start: 2024-07-19 | End: 2024-07-21 | Stop reason: HOSPADM

## 2024-07-19 RX ORDER — GABAPENTIN 300 MG/1
300 CAPSULE ORAL 3 TIMES DAILY
Status: DISCONTINUED | OUTPATIENT
Start: 2024-07-19 | End: 2024-07-21 | Stop reason: HOSPADM

## 2024-07-19 RX ORDER — POLYETHYLENE GLYCOL 3350 17 G/17G
17 POWDER, FOR SOLUTION ORAL DAILY PRN
Status: DISCONTINUED | OUTPATIENT
Start: 2024-07-19 | End: 2024-07-21 | Stop reason: HOSPADM

## 2024-07-19 RX ORDER — BUPROPION HYDROCHLORIDE 150 MG/1
150 TABLET, EXTENDED RELEASE ORAL 2 TIMES DAILY
Status: DISCONTINUED | OUTPATIENT
Start: 2024-07-19 | End: 2024-07-21 | Stop reason: HOSPADM

## 2024-07-19 RX ORDER — LEVOTHYROXINE SODIUM 0.15 MG/1
150 TABLET ORAL DAILY
COMMUNITY
Start: 2024-05-09

## 2024-07-19 RX ORDER — DEXTROSE MONOHYDRATE 100 MG/ML
INJECTION, SOLUTION INTRAVENOUS CONTINUOUS PRN
Status: DISCONTINUED | OUTPATIENT
Start: 2024-07-19 | End: 2024-07-21 | Stop reason: HOSPADM

## 2024-07-19 RX ORDER — INSULIN LISPRO 100 [IU]/ML
0-4 INJECTION, SOLUTION INTRAVENOUS; SUBCUTANEOUS NIGHTLY
Status: DISCONTINUED | OUTPATIENT
Start: 2024-07-19 | End: 2024-07-21 | Stop reason: HOSPADM

## 2024-07-19 RX ORDER — 0.9 % SODIUM CHLORIDE 0.9 %
1000 INTRAVENOUS SOLUTION INTRAVENOUS ONCE
Status: DISCONTINUED | OUTPATIENT
Start: 2024-07-19 | End: 2024-07-21 | Stop reason: HOSPADM

## 2024-07-19 RX ORDER — MAGNESIUM SULFATE IN WATER 40 MG/ML
2000 INJECTION, SOLUTION INTRAVENOUS PRN
Status: DISCONTINUED | OUTPATIENT
Start: 2024-07-19 | End: 2024-07-21 | Stop reason: HOSPADM

## 2024-07-19 RX ORDER — ENOXAPARIN SODIUM 150 MG/ML
1 INJECTION SUBCUTANEOUS 2 TIMES DAILY
Status: DISCONTINUED | OUTPATIENT
Start: 2024-07-19 | End: 2024-07-21 | Stop reason: HOSPADM

## 2024-07-19 RX ORDER — CLONAZEPAM 0.5 MG/1
0.5 TABLET ORAL 2 TIMES DAILY PRN
COMMUNITY
Start: 2024-06-05

## 2024-07-19 RX ORDER — TRIAMCINOLONE ACETONIDE 5 MG/G
1 CREAM TOPICAL 2 TIMES DAILY PRN
COMMUNITY

## 2024-07-19 RX ORDER — ESCITALOPRAM OXALATE 10 MG/1
5 TABLET ORAL DAILY
Status: DISCONTINUED | OUTPATIENT
Start: 2024-07-20 | End: 2024-07-21 | Stop reason: HOSPADM

## 2024-07-19 RX ORDER — ESCITALOPRAM OXALATE 10 MG/1
5 TABLET ORAL DAILY
COMMUNITY

## 2024-07-19 RX ORDER — ATORVASTATIN CALCIUM 40 MG/1
40 TABLET, FILM COATED ORAL
Status: DISCONTINUED | OUTPATIENT
Start: 2024-07-19 | End: 2024-07-21 | Stop reason: HOSPADM

## 2024-07-19 RX ORDER — SODIUM CHLORIDE 9 MG/ML
INJECTION, SOLUTION INTRAVENOUS PRN
Status: DISCONTINUED | OUTPATIENT
Start: 2024-07-19 | End: 2024-07-21 | Stop reason: HOSPADM

## 2024-07-19 RX ORDER — INSULIN ASPART 100 [IU]/ML
30 INJECTION, SOLUTION INTRAVENOUS; SUBCUTANEOUS
COMMUNITY
Start: 2023-02-16

## 2024-07-19 RX ORDER — POTASSIUM CHLORIDE 7.45 MG/ML
10 INJECTION INTRAVENOUS PRN
Status: DISCONTINUED | OUTPATIENT
Start: 2024-07-19 | End: 2024-07-21 | Stop reason: HOSPADM

## 2024-07-19 RX ORDER — SODIUM CHLORIDE 0.9 % (FLUSH) 0.9 %
5-40 SYRINGE (ML) INJECTION EVERY 12 HOURS SCHEDULED
Status: DISCONTINUED | OUTPATIENT
Start: 2024-07-19 | End: 2024-07-21 | Stop reason: HOSPADM

## 2024-07-19 RX ORDER — ASPIRIN 81 MG/1
81 TABLET ORAL DAILY
Status: DISCONTINUED | OUTPATIENT
Start: 2024-07-20 | End: 2024-07-21 | Stop reason: HOSPADM

## 2024-07-19 RX ORDER — FLUTICASONE FUROATE AND VILANTEROL TRIFENATATE 100; 25 UG/1; UG/1
1 POWDER RESPIRATORY (INHALATION) DAILY
COMMUNITY

## 2024-07-19 RX ORDER — ACETAMINOPHEN 325 MG/1
650 TABLET ORAL EVERY 6 HOURS PRN
Status: DISCONTINUED | OUTPATIENT
Start: 2024-07-19 | End: 2024-07-21 | Stop reason: HOSPADM

## 2024-07-19 RX ORDER — LEVOTHYROXINE SODIUM 0.15 MG/1
150 TABLET ORAL DAILY
Status: DISCONTINUED | OUTPATIENT
Start: 2024-07-20 | End: 2024-07-21 | Stop reason: HOSPADM

## 2024-07-19 RX ORDER — GLUCAGON 1 MG/ML
1 KIT INJECTION PRN
Status: DISCONTINUED | OUTPATIENT
Start: 2024-07-19 | End: 2024-07-21 | Stop reason: HOSPADM

## 2024-07-19 RX ORDER — LISINOPRIL 10 MG/1
10 TABLET ORAL DAILY
Status: DISCONTINUED | OUTPATIENT
Start: 2024-07-20 | End: 2024-07-21 | Stop reason: HOSPADM

## 2024-07-19 RX ADMIN — ENOXAPARIN SODIUM 120 MG: 150 INJECTION SUBCUTANEOUS at 21:37

## 2024-07-19 RX ADMIN — ATORVASTATIN CALCIUM 40 MG: 40 TABLET, FILM COATED ORAL at 21:37

## 2024-07-19 RX ADMIN — BUPROPION HYDROCHLORIDE 150 MG: 150 TABLET, EXTENDED RELEASE ORAL at 21:36

## 2024-07-19 RX ADMIN — CLONAZEPAM 0.5 MG: 0.5 TABLET ORAL at 23:51

## 2024-07-19 RX ADMIN — IOPAMIDOL 75 ML: 755 INJECTION, SOLUTION INTRAVENOUS at 11:31

## 2024-07-19 RX ADMIN — SODIUM CHLORIDE, PRESERVATIVE FREE 10 ML: 5 INJECTION INTRAVENOUS at 21:37

## 2024-07-19 RX ADMIN — GABAPENTIN 300 MG: 300 CAPSULE ORAL at 21:36

## 2024-07-19 ASSESSMENT — PAIN - FUNCTIONAL ASSESSMENT
PAIN_FUNCTIONAL_ASSESSMENT: NONE - DENIES PAIN
PAIN_FUNCTIONAL_ASSESSMENT: NONE - DENIES PAIN

## 2024-07-19 ASSESSMENT — PAIN SCALES - GENERAL
PAINLEVEL_OUTOF10: 0

## 2024-07-19 ASSESSMENT — PAIN DESCRIPTION - FREQUENCY: FREQUENCY: CONTINUOUS

## 2024-07-19 ASSESSMENT — PAIN DESCRIPTION - ONSET: ONSET: ON-GOING

## 2024-07-19 ASSESSMENT — PAIN DESCRIPTION - PAIN TYPE: TYPE: ACUTE PAIN

## 2024-07-19 ASSESSMENT — LIFESTYLE VARIABLES
HOW MANY STANDARD DRINKS CONTAINING ALCOHOL DO YOU HAVE ON A TYPICAL DAY: PATIENT DOES NOT DRINK
HOW OFTEN DO YOU HAVE A DRINK CONTAINING ALCOHOL: MONTHLY OR LESS

## 2024-07-19 NOTE — ED PROVIDER NOTES
rhonchi present.   Chest:      Chest wall: No tenderness.   Abdominal:      Palpations: Abdomen is soft.      Tenderness: There is no abdominal tenderness.   Musculoskeletal:         General: No tenderness.      Right lower leg: No edema.      Left lower leg: No edema.   Skin:     General: Skin is warm and dry.      Capillary Refill: Capillary refill takes less than 2 seconds.   Neurological:      General: No focal deficit present.      Mental Status: He is alert and oriented to person, place, and time.   Psychiatric:         Mood and Affect: Mood normal.         MDM:   Chart Reviewed: PMH and additional information as noted in HPI obtained from outside chart review.    Vitals:    Vitals:    07/19/24 1543 07/19/24 1630   BP: (!) 151/63 (!) 131/38   Pulse: 77 72   Resp: 16 15   Temp: 98.2 °F (36.8 °C)    TempSrc: Oral    SpO2: 94% 96%   Weight: 113.9 kg (251 lb)    Height: 1.778 m (5' 10\")        PROCEDURES:  Unless otherwise noted below, none  Procedures    LABS:  Labs Reviewed   COMPREHENSIVE METABOLIC PANEL - Abnormal; Notable for the following components:       Result Value    Potassium 5.3 (*)     Glucose 121 (*)     BUN 34 (*)     Creatinine 1.67 (*)     Est, Glom Filt Rate 44.5 (*)     Alkaline Phosphatase 150 (*)     All other components within normal limits   CBC WITH AUTO DIFFERENTIAL - Abnormal; Notable for the following components:    WBC 17.7 (*)     RBC 4.36 (*)     Hemoglobin 11.4 (*)     Hematocrit 36.0 (*)     MCH 26.1 (*)     MCHC 31.7 (*)     RDW 15.9 (*)     Neutrophils Absolute 11.0 (*)     Eosinophils Absolute 1.9 (*)     All other components within normal limits   TROPONIN - Abnormal; Notable for the following components:    Troponin, High Sensitivity 28 (*)     All other components within normal limits   MAGNESIUM   BRAIN NATRIURETIC PEPTIDE   APTT   PROTIME-INR   TROPONIN       No orders to display       ED Course as of 07/19/24 1733   Fri Jul 19, 2024   1648 EKG 12 Lead  EKG showing sinus  rhythm, rate of 70bpm. Normal axis, normal intervals otherwise. No gross acute ST-T wave abnls. [NA]   1727 Creatinine(!): 1.67 [NA]   1727 BUN,BUNPL(!): 34 [NA]   1727 Pro-BNP: 448 [NA]   1727 WBC(!): 17.7  Chronic leukocytosis. [NA]      ED Course User Index  [NA] Raza Tompkins MD       69 y.o. male with a PMH clinically significant for URBANO, Hodkins lymphoma, Thyroid CA, AV insufficiency, HTN, HLD, DM II, CAD, COPD, Obesity, LANA, Hypothyroidism, and Tobacco Smoking, s/p Thyroidectomy presenting to the ED via PV c/o increased cough intermittently productive of bloody sputum, shortness of breath on exertion, shortness of breath at rest, generalized weakness and fatigue ongoing over the past 3 weeks.   Upon initial evaluation, Pt fatigued-appearing, but otherwise Afebrile, HDS and in NAD. PE as noted above.  Labs, EKG, and Imaging visualized and interpreted by myself and as noted above.  Given findings, clinical presentation most likely consistent w/ acute pulmonary emboli in the setting of newly diagnosed lung mass in the setting of tobacco smoking and prior history of thyroid cancer and Hodgkin's lymphoma.  [facility]A obtained at [facility] as noted in HPI.  Although considered, no gross evidence of acute respiratory failure at this time.  Chronic leukocytosis unchanged from prior.  Afebrile at this time.  Lower suspicion for acute infectious processes at this time. AC deferred to admitting service. High Risk PESI scoring. Will be admitted at this time.  Pt was administered   Medications   sodium chloride 0.9 % bolus 1,000 mL (has no administration in time range)       Plan: Admit to IM for further evaluation and management. Report given to Dr. Sarkar. Patient understanding and amenable to the POC.    CRITICAL CARE TIME   Total CriticalCare time was 0 minutes, excluding separately reportable procedures.  There was a high probability of clinically significant/life threatening deterioration in the

## 2024-07-19 NOTE — H&P
Hospital Medicine  History and Physical    Patient:  Gerardo Esquivel  MRN: [id number]    CHIEF COMPLAINT:    Chief Complaint   Patient presents with    Shortness of Breath     Sent from [facility]        History Obtained From:  Patient, EMR  Primary Care Physician: Sandra Barrera MD    HISTORY OF PRESENT ILLNESS:   The patient is a 69 y.o. male with PMH of nonobstructive CAD, HTN, AS, DM2, CKD3, obesity, LANA, hypothyroidism, s/p splenectomy, MRSA perineal abscess in 2014, tobacco use, COPD, who presented with the above CC. Patient has been experiencing progressively worsening shortness of breath for the last 3 weeks associated with productive cough for the last 10 days that's bloody occasionally, had CTA of the chest done  today as outpatient that showed a LLL mass with associated adenopathy and bilateral lower love PE, sent to the hospital for further evaluation. Patient is stable HD, on RA on arrival to ED, labs showed leukocytosis, hyperkalemia and CKD3, admitted for further management.    Past Medical History:      Diagnosis Date    COPD (chronic obstructive pulmonary disease) (HCC)     Depression     Hodgkin disease (HCC)     Hyperlipidemia     Hypertension     MRSA infection 9/2014    Left Groin     Sleep apnea     Thyroid cancer (HCC)     Type II or unspecified type diabetes mellitus without mention of complication, not stated as uncontrolled        Past Surgical History:      Procedure Laterality Date    ABDOMEN SURGERY      hodgkin     CHOLECYSTECTOMY      COLONOSCOPY      COLONOSCOPY N/A 11/22/2016    COLONOSCOPY performed by Saundra Blount MD at [facility]    COLONOSCOPY N/A 11/4/2021    COLORECTAL CANCER SCREENING, HIGH RISK WITH POLYPECTOMY performed by Saundra Blount MD at [facility]    ENDOSCOPY, COLON, DIAGNOSTIC      HERNIA REPAIR      umbilical    SPLENECTOMY, TOTAL      TOOTH EXTRACTION      TOTAL THYROIDECTOMY      CANCER       Medications Prior to Admission:    Prior to Admission  seen. Upper Abdomen: Limited images of the upper abdomen show surgical clips in the left upper quadrant..  Soft tissue density is seen also that may reflect a splenial.  The gallbladder surgically absent. Soft Tissues/Bones: Degenerative changes are seen in the thoracic spine at multiple levels.  A sclerotic focus is seen in the upper thoracic spine and may reflect bone island.     1.  There are small filling defects seen in the peripheral vessels in the lower lobes most consistent with pulmonary emboli.  2.  A mass is now seen in the left lower lobe as described.  There is also adenopathy.  Findings discussed with Dr. Cho on today's date.           Assessment and Plan     69 y.o. male with PMH of nonobstructive CAD, HTN, AS, DM2, CKD3, obesity, LANA, hypothyroidism, s/p splenectomy, MRSA perineal abscess in 2014, tobacco use, COPD, who presented with:    Progressively worsening dyspnea, productive cough, leukocytosis   - CTA of the chest done today as outpatient that showed a LLL mass with associated adenopathy and bilateral lower love PE,   - on RA , is stable HD,   - start SC Lovenox, Duonebs  - consult pulmonology    Hyperkalemia   - mild, monitor    HTN  - continue home meds    DM2  - ISS    CKD3  - monitor renal function    LANA  - CPAP at HS            TRUDY CAMPOS MD, MD  Admitting Hospitalist

## 2024-07-20 LAB
ALBUMIN SERPL-MCNC: 3.6 G/DL (ref 3.5–4.6)
ANION GAP SERPL CALCULATED.3IONS-SCNC: 12 MEQ/L (ref 9–15)
ANISOCYTOSIS BLD QL SMEAR: ABNORMAL
BASOPHILS # BLD: 0 K/UL (ref 0–0.2)
BASOPHILS NFR BLD: 1.7 %
BUN SERPL-MCNC: 28 MG/DL (ref 8–23)
CALCIUM SERPL-MCNC: 8.3 MG/DL (ref 8.5–9.9)
CHLORIDE SERPL-SCNC: 104 MEQ/L (ref 95–107)
CO2 SERPL-SCNC: 19 MEQ/L (ref 20–31)
CREAT SERPL-MCNC: 1.52 MG/DL (ref 0.7–1.2)
CRP SERPL HS-MCNC: 23.6 MG/L (ref 0–5)
EOSINOPHIL # BLD: 1.7 K/UL (ref 0–0.7)
EOSINOPHIL NFR BLD: 10 %
ERYTHROCYTE [DISTWIDTH] IN BLOOD BY AUTOMATED COUNT: 15.9 % (ref 11.5–14.5)
ESTIMATED AVERAGE GLUCOSE: 203 MG/DL
GLUCOSE BLD-MCNC: 133 MG/DL (ref 70–99)
GLUCOSE BLD-MCNC: 185 MG/DL (ref 70–99)
GLUCOSE BLD-MCNC: 194 MG/DL (ref 70–99)
GLUCOSE BLD-MCNC: 214 MG/DL (ref 70–99)
GLUCOSE SERPL-MCNC: 125 MG/DL (ref 70–99)
HBA1C MFR BLD: 8.7 % (ref 4–6)
HCT VFR BLD AUTO: 34.3 % (ref 42–52)
HGB BLD-MCNC: 11 G/DL (ref 14–18)
LYMPHOCYTES # BLD: 5 K/UL (ref 1–4.8)
LYMPHOCYTES NFR BLD: 29 %
MAGNESIUM SERPL-MCNC: 1.8 MG/DL (ref 1.7–2.4)
MCH RBC QN AUTO: 26.5 PG (ref 27–31.3)
MCHC RBC AUTO-ENTMCNC: 32.1 % (ref 33–37)
MCV RBC AUTO: 82.7 FL (ref 79–92.2)
METAMYELOCYTES NFR BLD MANUAL: 2 %
MONOCYTES # BLD: 1 K/UL (ref 0.2–0.8)
MONOCYTES NFR BLD: 5.8 %
NEUTROPHILS # BLD: 9.5 K/UL (ref 1.4–6.5)
NEUTS SEG NFR BLD: 53 %
PERFORMED ON: ABNORMAL
PHOSPHATE SERPL-MCNC: 3.1 MG/DL (ref 2.3–4.8)
PLATELET # BLD AUTO: 386 K/UL (ref 130–400)
PLATELET BLD QL SMEAR: ADEQUATE
POTASSIUM SERPL-SCNC: 5.1 MEQ/L (ref 3.4–4.9)
PROCALCITONIN SERPL IA-MCNC: 0.09 NG/ML (ref 0–0.15)
RBC # BLD AUTO: 4.15 M/UL (ref 4.7–6.1)
SODIUM SERPL-SCNC: 135 MEQ/L (ref 135–144)
WBC # BLD AUTO: 17.2 K/UL (ref 4.8–10.8)

## 2024-07-20 PROCEDURE — 2060000000 HC ICU INTERMEDIATE R&B

## 2024-07-20 PROCEDURE — 94660 CPAP INITIATION&MGMT: CPT

## 2024-07-20 PROCEDURE — 80069 RENAL FUNCTION PANEL: CPT

## 2024-07-20 PROCEDURE — 5A09357 ASSISTANCE WITH RESPIRATORY VENTILATION, LESS THAN 24 CONSECUTIVE HOURS, CONTINUOUS POSITIVE AIRWAY PRESSURE: ICD-10-PCS | Performed by: INTERNAL MEDICINE

## 2024-07-20 PROCEDURE — 83036 HEMOGLOBIN GLYCOSYLATED A1C: CPT

## 2024-07-20 PROCEDURE — 84145 PROCALCITONIN (PCT): CPT

## 2024-07-20 PROCEDURE — 6360000002 HC RX W HCPCS: Performed by: INTERNAL MEDICINE

## 2024-07-20 PROCEDURE — 83735 ASSAY OF MAGNESIUM: CPT

## 2024-07-20 PROCEDURE — 6370000000 HC RX 637 (ALT 250 FOR IP): Performed by: INTERNAL MEDICINE

## 2024-07-20 PROCEDURE — 85025 COMPLETE CBC W/AUTO DIFF WBC: CPT

## 2024-07-20 PROCEDURE — 2580000003 HC RX 258: Performed by: INTERNAL MEDICINE

## 2024-07-20 PROCEDURE — 99223 1ST HOSP IP/OBS HIGH 75: CPT | Performed by: INTERNAL MEDICINE

## 2024-07-20 PROCEDURE — 36415 COLL VENOUS BLD VENIPUNCTURE: CPT

## 2024-07-20 PROCEDURE — 86140 C-REACTIVE PROTEIN: CPT

## 2024-07-20 RX ADMIN — ENOXAPARIN SODIUM 120 MG: 150 INJECTION SUBCUTANEOUS at 09:15

## 2024-07-20 RX ADMIN — GABAPENTIN 300 MG: 300 CAPSULE ORAL at 20:31

## 2024-07-20 RX ADMIN — ASPIRIN 81 MG: 81 TABLET, COATED ORAL at 09:16

## 2024-07-20 RX ADMIN — ENOXAPARIN SODIUM 120 MG: 150 INJECTION SUBCUTANEOUS at 20:31

## 2024-07-20 RX ADMIN — ATORVASTATIN CALCIUM 40 MG: 40 TABLET, FILM COATED ORAL at 20:31

## 2024-07-20 RX ADMIN — METOPROLOL SUCCINATE 25 MG: 25 TABLET, EXTENDED RELEASE ORAL at 09:16

## 2024-07-20 RX ADMIN — GABAPENTIN 300 MG: 300 CAPSULE ORAL at 13:44

## 2024-07-20 RX ADMIN — GABAPENTIN 300 MG: 300 CAPSULE ORAL at 09:16

## 2024-07-20 RX ADMIN — BUPROPION HYDROCHLORIDE 150 MG: 150 TABLET, EXTENDED RELEASE ORAL at 09:16

## 2024-07-20 RX ADMIN — TAMSULOSIN HYDROCHLORIDE 0.4 MG: 0.4 CAPSULE ORAL at 09:16

## 2024-07-20 RX ADMIN — ESCITALOPRAM OXALATE 5 MG: 10 TABLET ORAL at 09:16

## 2024-07-20 RX ADMIN — SODIUM CHLORIDE, PRESERVATIVE FREE 10 ML: 5 INJECTION INTRAVENOUS at 09:16

## 2024-07-20 RX ADMIN — CLONAZEPAM 0.5 MG: 0.5 TABLET ORAL at 21:58

## 2024-07-20 RX ADMIN — LISINOPRIL 10 MG: 10 TABLET ORAL at 09:16

## 2024-07-20 RX ADMIN — LEVOTHYROXINE SODIUM 150 MCG: 150 TABLET ORAL at 06:17

## 2024-07-20 RX ADMIN — BUPROPION HYDROCHLORIDE 150 MG: 150 TABLET, EXTENDED RELEASE ORAL at 20:31

## 2024-07-20 RX ADMIN — Medication 1 TABLET: at 09:15

## 2024-07-20 ASSESSMENT — PAIN SCALES - GENERAL: PAINLEVEL_OUTOF10: 0

## 2024-07-20 NOTE — PLAN OF CARE
Problem: Discharge Planning  Goal: Discharge to home or other facility with appropriate resources  Outcome: Progressing  Flowsheets (Taken 7/20/2024 0904)  Discharge to home or other facility with appropriate resources:   Identify barriers to discharge with patient and caregiver   Arrange for needed discharge resources and transportation as appropriate   Identify discharge learning needs (meds, wound care, etc)     Problem: Pain  Goal: Verbalizes/displays adequate comfort level or baseline comfort level  Outcome: Progressing     Problem: Safety - Adult  Goal: Free from fall injury  Outcome: Progressing     Problem: ABCDS Injury Assessment  Goal: Absence of physical injury  Outcome: Progressing     Problem: Chronic Conditions and Co-morbidities  Goal: Patient's chronic conditions and co-morbidity symptoms are monitored and maintained or improved  Outcome: Progressing  Flowsheets (Taken 7/20/2024 0904)  Care Plan - Patient's Chronic Conditions and Co-Morbidity Symptoms are Monitored and Maintained or Improved: Monitor and assess patient's chronic conditions and comorbid symptoms for stability, deterioration, or improvement   Electronically signed by Gosia Cortes RN on 7/20/2024 at 3:55 PM

## 2024-07-20 NOTE — PROGRESS NOTES
Hospitalist Progress Note      PCP: Sandra Barrera MD    Date of Admission: 7/19/2024    Chief Complaint:  no acute events, afebrile, stable HD, on RA    Medications:  Reviewed    Infusion Medications    sodium chloride      dextrose       Scheduled Medications    sodium chloride  1,000 mL IntraVENous Once    sodium chloride flush  5-40 mL IntraVENous 2 times per day    enoxaparin  1 mg/kg SubCUTAneous BID    insulin lispro  0-4 Units SubCUTAneous TID WC    insulin lispro  0-4 Units SubCUTAneous Nightly    aspirin  81 mg Oral Daily    atorvastatin  40 mg Oral QHS    buPROPion  150 mg Oral BID    escitalopram  5 mg Oral Daily    gabapentin  300 mg Oral TID    levothyroxine  150 mcg Oral Daily    lisinopril  10 mg Oral Daily    metoprolol succinate  25 mg Oral Daily    tamsulosin  0.4 mg Oral Daily    folic acid-pyridoxine-cyancobalamin 2.5-25-2 mg tablet  1 tablet Oral Daily     PRN Meds: sodium chloride flush, sodium chloride, potassium chloride **OR** potassium alternative oral replacement **OR** potassium chloride, magnesium sulfate, ondansetron **OR** ondansetron, polyethylene glycol, acetaminophen **OR** acetaminophen, glucose, dextrose bolus **OR** dextrose bolus, glucagon (rDNA), dextrose, clonazePAM      Intake/Output Summary (Last 24 hours) at 7/20/2024 1036  Last data filed at 7/20/2024 0916  Gross per 24 hour   Intake 10 ml   Output --   Net 10 ml       Exam:    BP (!) 142/53   Pulse 70   Temp 98.1 °F (36.7 °C) (Oral)   Resp 18   Ht 1.778 m (5' 10\")   Wt 113.6 kg (250 lb 7.1 oz)   SpO2 94%   BMI 35.93 kg/m²     General appearance: alert, cooperative  Lungs: diminished breath sounds bilaterally  Heart: S1/S2,RRR, murmur present  Abdomen: soft, active BS  Extremities: no edema      Labs:   Recent Labs     07/19/24  1615 07/20/24  0558   WBC 17.7* 17.2*   HGB 11.4* 11.0*   HCT 36.0* 34.3*    386     Recent Labs     07/19/24  1044 07/19/24  1615 07/20/24  0558   NA  --  136 135   K  --  5.3*

## 2024-07-20 NOTE — PLAN OF CARE
Problem: Discharge Planning  Goal: Discharge to home or other facility with appropriate resources  7/20/2024 0005 by Sheeba Sevilla RN  Outcome: Progressing     Problem: Pain  Goal: Verbalizes/displays adequate comfort level or baseline comfort level  7/20/2024 0005 by Sheeba Sevilla RN  Outcome: Progressing  Flowsheets (Taken 7/19/2024 1937)  Verbalizes/displays adequate comfort level or baseline comfort level: Encourage patient to monitor pain and request assistance     Problem: Safety - Adult  Goal: Free from fall injury  7/20/2024 0005 by Sheeba Sevilla RN  Outcome: Progressing     Problem: ABCDS Injury Assessment  Goal: Absence of physical injury  7/20/2024 0005 by Sheeba Sevilla, RN  Outcome: Progressing

## 2024-07-20 NOTE — CONSULTS
Spiritual Care Services     Summary of Visit:    Patient greeted  warmly, sat up in bed to engage in conversation.  Patient very pleasant and engaging.  Spoke of his life - his passion for golf, his love of Christian and oliverio.  Patient expressed missing Christian.  Patient spoke of his well-being, his hopes, and his fears.  Patient is not , has no children.  Patient close with his brother, Live.      Patient appreciated conversation with  and expressed an openness for ongoing visits from Spiritual Care.        Encounter Summary  Encounter Overview/Reason: Spiritual/Emotional Needs, Initial Encounter  Service Provided For: Patient  Referral/Consult From: Physician, Nurse  Support System: Family members  Complexity of Encounter: Moderate  Begin Time: 1015  End Time : 1035  Total Time Calculated: 20 min        Spiritual/Emotional needs  Type: Spiritual Support, Emotional Distress                      Spiritual Assessment/Intervention/Outcomes:    Assessment: Anxious, Compromised coping, Fearful, Hopeful    Intervention: Active listening, Discussed belief system/Jewish practices/oliverio, Discussed illness injury and it’s impact, Prayer (assurance of)/Wilmer, Sustaining Presence/Ministry of presence    Outcome: Comfort, Connection/Belonging, Encouraged      Care Plan:    Plan and Referrals  Plan/Referrals: Continue Support (comment)    Request that week-day  follow up with patient to provide spiritual support and encouragement.        Spiritual Care Services   Electronically signed by ROZINA Thornton on 7/20/2024 at 11:15 AM.    To reach a  for emotional and spiritual support, place an EPIC consult request.   If a  is needed immediately, dial “0” and ask to page the on-call .

## 2024-07-20 NOTE — FLOWSHEET NOTE
Pt requesting CPAP for overnight uses it at home every HS. Request for CPAP order placed via secure message to Dr. Salvador. Order placed and carried out per RT. CPAP is intact and pt resting comfortably with call light within reach. No issues or concerns to address. Will continue to monitor.

## 2024-07-20 NOTE — CONSULTS
Hematology/Oncology Consult  Encounter Date: 2024 6:50 PM    Mr. Gerardo Esquivel is a 69 y.o. male  : 1955  MRN: [id number]  Acct Number: [id number]  Requesting Provider: Gini Sarkar MD    Reason for request: lung mass      CONSULTANT: Maine Soriano MD    HPI:   This is a 69 y.o. male with PMH significant for COPD, tobacco abuse, LANA on CPAP, history of pneumothorax status post pleurodesis on right side.  Nonobstructive CAD, HTN, AS, DM2, CKD3, obesity, hypothyroidism, s/p splenectomy, MRSA perineal abscess in , who was presented with increased shortness of breath which has been progressively worsening.  for the last 3 weeks associated with productive cough for the last 10 days that's bloody occasionally, had CTA of the chest done  yesterday as outpatient that showed a 3.2 X 4.1 cm LLL mass with associated adenopathy and bilateral lower love PE, sent to the hospital for further evaluation.      Of note pt had CT lung cancer screening on 24 which did not reveal any mass on left side. Dr. Sarkar spoke with radiologist who stated the mass was there on 24 CT scan. Unclear about evidence of PE on recent CTA.    Patient is currently on Lovenox 1 mg/kg subcutaneous twice a day.  Patient is on RA and O2 saturation 94%.  On arrival to ED, labs showed leukocytosis, hyperkalemia and CKD3, admitted for further management.  Oncology was consulted for management of lung mass    Pt was seen today stated SOB, cough has all improved. Still feels tired. Stated now the sputum is clear from dark brown since admission.    Patient Active Problem List   Diagnosis    Pneumonia    DKA, type 2, not at goal (HCC)    Adenomatous polyp of sigmoid colon    Adenomatous polyp of descending colon    Adenomatous polyp of colon    Adenomatous polyp of transverse colon    PE (pulmonary thromboembolism) (HCC)     Past Medical History:   Diagnosis Date    COPD (chronic obstructive pulmonary disease) (HCC)     Depression      Hodgkin disease (HCC)     Hyperlipidemia     Hypertension     MRSA infection 2014    Left Groin     Sleep apnea     Thyroid cancer (HCC)     Type II or unspecified type diabetes mellitus without mention of complication, not stated as uncontrolled      @PSH@  Family History   Problem Relation Age of Onset    Stroke Mother     Heart Disease Father     Colon Cancer Neg Hx      Social History     Socioeconomic History    Marital status:      Spouse name: Not on file    Number of children: Not on file    Years of education: Not on file    Highest education level: Not on file   Occupational History     Comment: exposures to diesel fuels   Tobacco Use    Smoking status: Former     Current packs/day: 0.00     Average packs/day: 2.0 packs/day for 35.0 years (70.0 ttl pk-yrs)     Types: Cigarettes     Start date:      Quit date:      Years since quittin.5    Smokeless tobacco: Never   Vaping Use    Vaping Use: Never used   Substance and Sexual Activity    Alcohol use: No    Drug use: No    Sexual activity: Not on file   Other Topics Concern    Not on file   Social History Narrative    Not on file     Social Determinants of Health     Financial Resource Strain: Not on file   Food Insecurity: No Food Insecurity (2024)    Hunger Vital Sign     Worried About Running Out of Food in the Last Year: Never true     Ran Out of Food in the Last Year: Never true   Transportation Needs: No Transportation Needs (2024)    PRAPARE - Transportation     Lack of Transportation (Medical): No     Lack of Transportation (Non-Medical): No   Physical Activity: Not on file   Stress: Not on file   Social Connections: Not on file   Intimate Partner Violence: Not on file   Housing Stability: Low Risk  (2024)    Housing Stability Vital Sign     Unable to Pay for Housing in the Last Year: No     Number of Places Lived in the Last Year: 1     Unstable Housing in the Last Year: No          Current Facility-Administered

## 2024-07-20 NOTE — CONSULTS
Pulmonary Medicine  Consult Note      Reason for consultation: Shortness of breath    Consulting physician: Dr. Sarkar    HISTORY OF PRESENT ILLNESS:      This is a 69 y.o. male with PMH significant for COPD, tobacco abuse, LANA on CPAP, history of pneumothorax status post pleurodesis on right side.  Nonobstructive CAD, HTN, AS, DM2, CKD3, obesity, hypothyroidism, s/p splenectomy, MRSA perineal abscess in 2014, who was presented with increased shortness of breath which has been progressively worsening.  for the last 3 weeks associated with productive cough for the last 10 days that's bloody occasionally, had CTA of the chest done  today as outpatient that showed a LLL mass with associated adenopathy and bilateral lower love PE, sent to the hospital for further evaluation.  Patient is currently on Lovenox 1 mg/kg subcutaneous twice a day.  Patient is on RA and O2 saturation 94%.  On arrival to ED, labs showed leukocytosis, hyperkalemia and CKD3, admitted for further management.  Pulmonary was consulted regarding abnormal CT chest.    Past Medical History:        Diagnosis Date    COPD (chronic obstructive pulmonary disease) (HCC)     Depression     Hodgkin disease (HCC)     Hyperlipidemia     Hypertension     MRSA infection 9/2014    Left Groin     Sleep apnea     Thyroid cancer (HCC)     Type II or unspecified type diabetes mellitus without mention of complication, not stated as uncontrolled        Past Surgical History:        Procedure Laterality Date    ABDOMEN SURGERY      hodgkin     CHOLECYSTECTOMY      COLONOSCOPY      COLONOSCOPY N/A 11/22/2016    COLONOSCOPY performed by Saundra Blount MD at [facility]    COLONOSCOPY N/A 11/4/2021    COLORECTAL CANCER SCREENING, HIGH RISK WITH POLYPECTOMY performed by Saundra Blount MD at [facility]    ENDOSCOPY, COLON, DIAGNOSTIC      HERNIA REPAIR      umbilical    SPLENECTOMY, TOTAL      TOOTH EXTRACTION      TOTAL THYROIDECTOMY      CANCER       Social  in the left lower lobe as described.  There is also adenopathy.  Findings discussed with Dr. Cho on today's date.       Assessment and  plan:     Left lower lung mass with adenopathy, malignancy until proven otherwise  Bilateral lower lobe pulmonary embolism on anticoagulation therapy  COPD  Obstructive sleep apnea on CPAP therapy  History of pneumothorax status post pleurodesis on right  Hypertension  CKD stage III    Patient is currently being treated for bilateral lower lobe PE with Lovenox 1 mg/kg subcutaneous twice a day.  Found having left lower lung lung mass with adenopathy and most likely malignancy.  He has a history of tobacco abuse.  Underlying COPD.  He said he has also collapsed lungs on the right side requiring chest tube and pleurodesis in past.  I do She is some calcifications pleural placed on right side.  At this time continue anticoagulation therapy due to bilateral PE recommend to have a PET scan and lung biopsy which can be done as outpatient.  Continue CPAP therapy during sleep.  Check DVT study if not done.  If DVT study is negative switch anticoagulation therapy to p.o. Eliquis or Xarelto and can be discharged home tomorrow and PET scan and [facility] as outpatient.  Will follow    Thank you for consult    NOTE: This report was transcribed using voice recognition software. Every effort was made to ensure accuracy; however, inadvertent computerized transcription errors may be present.    Electronically signed by Nicholas Malcolm MD, FCCP on 7/20/2024 at 12:16 PM     No

## 2024-07-21 ENCOUNTER — APPOINTMENT (OUTPATIENT)
Dept: CT IMAGING | Age: 69
DRG: 176 | End: 2024-07-21
Attending: INTERNAL MEDICINE
Payer: MEDICARE

## 2024-07-21 VITALS
TEMPERATURE: 97.9 F | RESPIRATION RATE: 18 BRPM | OXYGEN SATURATION: 96 % | HEART RATE: 69 BPM | HEIGHT: 70 IN | SYSTOLIC BLOOD PRESSURE: 142 MMHG | DIASTOLIC BLOOD PRESSURE: 69 MMHG | BODY MASS INDEX: 35.85 KG/M2 | WEIGHT: 250.44 LBS

## 2024-07-21 LAB
ALBUMIN SERPL-MCNC: 3.7 G/DL (ref 3.5–4.6)
ANION GAP SERPL CALCULATED.3IONS-SCNC: 10 MEQ/L (ref 9–15)
ANISOCYTOSIS BLD QL SMEAR: ABNORMAL
BASOPHILS # BLD: 0.2 K/UL (ref 0–0.2)
BASOPHILS NFR BLD: 1 %
BUN SERPL-MCNC: 25 MG/DL (ref 8–23)
CALCIUM SERPL-MCNC: 8.6 MG/DL (ref 8.5–9.9)
CHLORIDE SERPL-SCNC: 105 MEQ/L (ref 95–107)
CO2 SERPL-SCNC: 21 MEQ/L (ref 20–31)
CREAT SERPL-MCNC: 1.41 MG/DL (ref 0.7–1.2)
EOSINOPHIL # BLD: 1.6 K/UL (ref 0–0.7)
EOSINOPHIL NFR BLD: 9 %
ERYTHROCYTE [DISTWIDTH] IN BLOOD BY AUTOMATED COUNT: 15.9 % (ref 11.5–14.5)
GLUCOSE BLD-MCNC: 145 MG/DL (ref 70–99)
GLUCOSE BLD-MCNC: 148 MG/DL (ref 70–99)
GLUCOSE BLD-MCNC: 150 MG/DL (ref 70–99)
GLUCOSE SERPL-MCNC: 144 MG/DL (ref 70–99)
HCT VFR BLD AUTO: 36.9 % (ref 42–52)
HGB BLD-MCNC: 11.6 G/DL (ref 14–18)
LYMPHOCYTES # BLD: 4.6 K/UL (ref 1–4.8)
LYMPHOCYTES NFR BLD: 21 %
MAGNESIUM SERPL-MCNC: 1.7 MG/DL (ref 1.7–2.4)
MCH RBC QN AUTO: 26.1 PG (ref 27–31.3)
MCHC RBC AUTO-ENTMCNC: 31.4 % (ref 33–37)
MCV RBC AUTO: 82.9 FL (ref 79–92.2)
MONOCYTES # BLD: 1.6 K/UL (ref 0.2–0.8)
MONOCYTES NFR BLD: 8.6 %
NEUTROPHILS # BLD: 10.4 K/UL (ref 1.4–6.5)
NEUTS BAND NFR BLD MANUAL: 2 %
NEUTS SEG NFR BLD: 55 %
PERFORMED ON: ABNORMAL
PHOSPHATE SERPL-MCNC: 3 MG/DL (ref 2.3–4.8)
PLATELET # BLD AUTO: 382 K/UL (ref 130–400)
PLATELET BLD QL SMEAR: ADEQUATE
POIKILOCYTOSIS BLD QL SMEAR: ABNORMAL
POTASSIUM SERPL-SCNC: 4.8 MEQ/L (ref 3.4–4.9)
RBC # BLD AUTO: 4.45 M/UL (ref 4.7–6.1)
SLIDE REVIEW: ABNORMAL
SMUDGE CELLS BLD QL SMEAR: 15.2
SODIUM SERPL-SCNC: 136 MEQ/L (ref 135–144)
VARIANT LYMPHS NFR BLD: 4 %
WBC # BLD AUTO: 18.2 K/UL (ref 4.8–10.8)

## 2024-07-21 PROCEDURE — 36415 COLL VENOUS BLD VENIPUNCTURE: CPT

## 2024-07-21 PROCEDURE — 80069 RENAL FUNCTION PANEL: CPT

## 2024-07-21 PROCEDURE — 6360000004 HC RX CONTRAST MEDICATION: Performed by: INTERNAL MEDICINE

## 2024-07-21 PROCEDURE — 2580000003 HC RX 258: Performed by: INTERNAL MEDICINE

## 2024-07-21 PROCEDURE — 6360000002 HC RX W HCPCS: Performed by: INTERNAL MEDICINE

## 2024-07-21 PROCEDURE — 2700000000 HC OXYGEN THERAPY PER DAY

## 2024-07-21 PROCEDURE — 6370000000 HC RX 637 (ALT 250 FOR IP): Performed by: INTERNAL MEDICINE

## 2024-07-21 PROCEDURE — 85025 COMPLETE CBC W/AUTO DIFF WBC: CPT

## 2024-07-21 PROCEDURE — 83735 ASSAY OF MAGNESIUM: CPT

## 2024-07-21 PROCEDURE — 71275 CT ANGIOGRAPHY CHEST: CPT

## 2024-07-21 PROCEDURE — 99233 SBSQ HOSP IP/OBS HIGH 50: CPT | Performed by: INTERNAL MEDICINE

## 2024-07-21 RX ORDER — AZITHROMYCIN 500 MG/1
500 TABLET, FILM COATED ORAL DAILY
Status: DISCONTINUED | OUTPATIENT
Start: 2024-07-21 | End: 2024-07-21 | Stop reason: HOSPADM

## 2024-07-21 RX ADMIN — SODIUM CHLORIDE, PRESERVATIVE FREE 10 ML: 5 INJECTION INTRAVENOUS at 08:43

## 2024-07-21 RX ADMIN — METOPROLOL SUCCINATE 25 MG: 25 TABLET, EXTENDED RELEASE ORAL at 08:40

## 2024-07-21 RX ADMIN — ENOXAPARIN SODIUM 120 MG: 150 INJECTION SUBCUTANEOUS at 08:42

## 2024-07-21 RX ADMIN — TAMSULOSIN HYDROCHLORIDE 0.4 MG: 0.4 CAPSULE ORAL at 08:40

## 2024-07-21 RX ADMIN — IOPAMIDOL 75 ML: 755 INJECTION, SOLUTION INTRAVENOUS at 14:46

## 2024-07-21 RX ADMIN — AZITHROMYCIN 500 MG: 500 TABLET, FILM COATED ORAL at 08:40

## 2024-07-21 RX ADMIN — GABAPENTIN 300 MG: 300 CAPSULE ORAL at 08:40

## 2024-07-21 RX ADMIN — ASPIRIN 81 MG: 81 TABLET, COATED ORAL at 08:40

## 2024-07-21 RX ADMIN — GABAPENTIN 300 MG: 300 CAPSULE ORAL at 13:49

## 2024-07-21 RX ADMIN — LISINOPRIL 10 MG: 10 TABLET ORAL at 08:41

## 2024-07-21 RX ADMIN — CEFTRIAXONE SODIUM 1000 MG: 1 INJECTION, POWDER, FOR SOLUTION INTRAMUSCULAR; INTRAVENOUS at 09:19

## 2024-07-21 RX ADMIN — LEVOTHYROXINE SODIUM 150 MCG: 150 TABLET ORAL at 06:03

## 2024-07-21 RX ADMIN — Medication 1 TABLET: at 08:40

## 2024-07-21 RX ADMIN — ESCITALOPRAM OXALATE 5 MG: 10 TABLET ORAL at 08:41

## 2024-07-21 RX ADMIN — BUPROPION HYDROCHLORIDE 150 MG: 150 TABLET, EXTENDED RELEASE ORAL at 08:40

## 2024-07-21 ASSESSMENT — PAIN SCALES - GENERAL: PAINLEVEL_OUTOF10: 0

## 2024-07-21 NOTE — DISCHARGE INSTR - COC
Impairments/Disabilities:150234966}    Nutrition Therapy:  Current Nutrition Therapy:   { TRENT Diet List:856145521}    Routes of Feeding: {[facility] DME Other Feedings:762792919}  Liquids: {Slp liquid thickness:31317}  Daily Fluid Restriction: {CHP DME Yes amt example:361535690}  Last Modified Barium Swallow with Video (Video Swallowing Test): {Done Not Done Date:}    Treatments at the Time of Hospital Discharge:   Respiratory Treatments: ***  Oxygen Therapy:  {Therapy; copd oxygen:96017}  Ventilator:    { CC Vent List:313343913}    Rehab Therapies: {THERAPEUTIC INTERVENTION:6467404757}  Weight Bearing Status/Restrictions: { CC Weight Bearin}  Other Medical Equipment (for information only, NOT a DME order):  {EQUIPMENT:920597497}  Other Treatments: ***    Patient's personal belongings (please select all that are sent with patient):  {[facility] DME Belongings:500256252}    RN SIGNATURE:  {Esignature:144028931}    CASE MANAGEMENT/SOCIAL WORK SECTION    Inpatient Status Date: ***    Readmission Risk Assessment Score:  Readmission Risk              Risk of Unplanned Readmission:  19           Discharging to Facility/ Agency   Name:   Address:  Phone:  Fax:    Dialysis Facility (if applicable)   Name:  Address:  Dialysis Schedule:  Phone:  Fax:    / signature: {Esignature:421665981}    PHYSICIAN SECTION    Prognosis: {Prognosis:7341188123}    Condition at Discharge: { Patient Condition:502928154}    Rehab Potential (if transferring to Rehab): {Prognosis:4493039336}    Recommended Labs or Other Treatments After Discharge: ***    Physician Certification: I certify the above information and transfer of Gerardo Esquivel  is necessary for the continuing treatment of the diagnosis listed and that he requires {Admit to Appropriate Level of Care:18006} for {GREATER/LESS:832214505} 30 days.     Update Admission H&P: {CHP DME Changes in HandP:263161066}    PHYSICIAN SIGNATURE:

## 2024-07-21 NOTE — PROGRESS NOTES
Hospitalist Progress Note      PCP: Sandra Barrera MD    Date of Admission: 7/19/2024    Chief Complaint:  no acute events, afebrile, stable HD, on RA    Medications:  Reviewed    Infusion Medications    sodium chloride      dextrose       Scheduled Medications    cefTRIAXone (ROCEPHIN) IV  1,000 mg IntraVENous Q24H    azithromycin  500 mg Oral Daily    sodium chloride  1,000 mL IntraVENous Once    sodium chloride flush  5-40 mL IntraVENous 2 times per day    enoxaparin  1 mg/kg SubCUTAneous BID    insulin lispro  0-4 Units SubCUTAneous TID WC    insulin lispro  0-4 Units SubCUTAneous Nightly    aspirin  81 mg Oral Daily    atorvastatin  40 mg Oral QHS    buPROPion  150 mg Oral BID    escitalopram  5 mg Oral Daily    gabapentin  300 mg Oral TID    levothyroxine  150 mcg Oral Daily    lisinopril  10 mg Oral Daily    metoprolol succinate  25 mg Oral Daily    tamsulosin  0.4 mg Oral Daily    folic acid-pyridoxine-cyancobalamin 2.5-25-2 mg tablet  1 tablet Oral Daily     PRN Meds: sodium chloride flush, sodium chloride, potassium chloride **OR** potassium alternative oral replacement **OR** potassium chloride, magnesium sulfate, ondansetron **OR** ondansetron, polyethylene glycol, acetaminophen **OR** acetaminophen, glucose, dextrose bolus **OR** dextrose bolus, glucagon (rDNA), dextrose, clonazePAM      Intake/Output Summary (Last 24 hours) at 7/21/2024 1004  Last data filed at 7/20/2024 1754  Gross per 24 hour   Intake 360 ml   Output --   Net 360 ml         Exam:    BP (!) 141/56   Pulse 71   Temp 98.6 °F (37 °C) (Oral)   Resp 20   Ht 1.778 m (5' 10\")   Wt 113.6 kg (250 lb 7.1 oz)   SpO2 95%   BMI 35.93 kg/m²     General appearance: alert, cooperative  Lungs: diminished breath sounds bilaterally  Heart: S1/S2,RRR, murmur present  Abdomen: soft, active BS  Extremities: no edema      Labs:   Recent Labs     07/19/24  1615 07/20/24  0558 07/21/24  0506   WBC 17.7* 17.2* 18.2*   HGB 11.4* 11.0* 11.6*   HCT 36.0*

## 2024-07-21 NOTE — CARE COORDINATION
Case Management Assessment  Initial Evaluation    Date/Time of Evaluation: 7/21/2024 10:40 AM  Assessment Completed by: Fabiola Gupta    If patient is discharged prior to next notation, then this note serves as note for discharge by case management.    Patient Name: Gerardo Esquivel                   YOB: 1955  Diagnosis: Lung mass [R91.8]  PE (pulmonary thromboembolism) (HCC) [I26.99]  Other acute pulmonary embolism without acute cor pulmonale (HCC) [I26.99]                   Date / Time: 7/19/2024  3:46 PM    Patient Admission Status: Inpatient   Readmission Risk (Low < 19, Mod (19-27), High > 27): Readmission Risk Score: 12.4    Current PCP: Sandra Barrera MD  PCP verified by CM? Yes    Chart Reviewed: Yes      History Provided by: Patient  Patient Orientation: Alert and Oriented, Person, Place, Situation, Self    Patient Cognition: Alert    Hospitalization in the last 30 days (Readmission):  No    If yes, Readmission Assessment in CM Navigator will be completed.    Advance Directives:      Code Status: Full Code   Patient's Primary Decision Maker is: Legal Next of Kin    Primary Decision Maker: Krys Almeida - Other - [phone]    Secondary Decision Maker: Live Esquivel - Brother/Sister - [phone]    Discharge Planning:    Patient lives with: Spouse/Significant Other Type of Home: House  Primary Care Giver: Self  Patient Support Systems include: Family Members, Spouse/Significant Other   Current Financial resources:    Current community resources:    Current services prior to admission: C-pap            Current DME:              Type of Home Care services:  None    ADLS  Prior functional level: Independent in ADLs/IADLs  Current functional level: Independent in ADLs/IADLs, Other (see comment) (Per pt stating he is steady . I did not see pt up .)    PT AM-PAC:   /24  OT AM-PAC:   /24    Family can provide assistance at DC: Other (comment) (Pt has LUIS Marcano who lives with him)  Would

## 2024-07-21 NOTE — DISCHARGE SUMMARY
Hospital Medicine Discharge Summary    Gerardo Esquivel  :  1955  MRN:  [id number]    Admit date:  2024  Discharge date:  2024    Admitting Physician:  Gini Sarkar MD  Primary Care Physician:  Sandra Barrera MD      Discharge Diagnoses:    Principal Problem:    PE (pulmonary thromboembolism) (HCC)  Resolved Problems:    * No resolved hospital problems. *      Hospital Course:   Gerardo Esquivel is a 69 y.o. male that was admitted and treated at [facility] for the following medical issues:     Progressively worsening dyspnea, productive cough, leukocytosis   - CTA of the chest done as outpatient showed a LLL mass with associated adenopathy and concerning for bilateral lower lobe PE,   - treated with SC Lovenox, remained on RA  - discussed with radiology, pulmonology and oncology  - repeat CTA today was negative for PE  - discharged home with outpatient follow with pulmonology to make arrangements for biopsy        Patient was seen by the following consultants while admitted to [facility]:   Consults:  IP CONSULT TO SPIRITUAL SERVICES  IP CONSULT TO PULMONOLOGY  IP CONSULT TO ONCOLOGY  IP CONSULT TO SPIRITUAL SERVICES    Significant Diagnostic Studies:    CTA CHEST W WO CONTRAST    Result Date: 2024  EXAMINATION: CTA OF THE CHEST WITH AND WITHOUT CONTRAST 2024 11:32 am TECHNIQUE: CTA of the chest was performed before and after the administration of intravenous contrast.  Multiplanar reformatted images are provided for review.  MIP images are provided for review. Automated exposure control, iterative reconstruction, and/or weight based adjustment of the mA/kV was utilized to reduce the radiation dose to as low as reasonably achievable. Radiation dose: Total exam DLP: 826.98 mGy-cm COMPARISON: None. HISTORY: ORDERING SYSTEM PROVIDED HISTORY: Cough, unspecified type, Dyspnea on exertion, Coughing up blood TECHNOLOGIST PROVIDED HISTORY: Additional  Medications:         Medication List        CONTINUE taking these medications      albuterol sulfate  (90 Base) MCG/ACT inhaler  Commonly known as: PROVENTIL;VENTOLIN;PROAIR     aspirin 81 MG EC tablet     atorvastatin 40 MG tablet  Commonly known as: LIPITOR     clonazePAM 0.5 MG tablet  Commonly known as: KLONOPIN     escitalopram 10 MG tablet  Commonly known as: LEXAPRO     gabapentin 300 MG capsule  Commonly known as: NEURONTIN     Insulin Degludec 200 UNIT/ML Sopn     levothyroxine 150 MCG tablet  Commonly known as: SYNTHROID     lisinopril 10 MG tablet  Commonly known as: PRINIVIL;ZESTRIL     metoprolol succinate 25 MG extended release tablet  Commonly known as: TOPROL XL     NovoLOG FlexPen 100 UNIT/ML injection pen  Generic drug: insulin aspart     tamsulosin 0.4 MG capsule  Commonly known as: FLOMAX     triamcinolone 0.5 % cream  Commonly known as: ARISTOCORT     Wellbutrin  MG extended release tablet  Generic drug: buPROPion     WesTab One 2.5-25-1 MG Tabs tablet  Generic drug: folic acid-pyridoxine-cyanocobalamine            STOP taking these medications      phentermine 37.5 MG tablet  Commonly known as: ADIPEX-P     sodium bicarbonate 650 MG tablet            ASK your doctor about these medications      Breo Ellipta 100-25 MCG/ACT inhaler  Generic drug: fluticasone furoate-vilanterol              Disposition:   Discharged to Home. Any [facility] needs that were indicated and/or required as been addressed and set up by Social Work.     Condition at discharge: Pt was medically stable at the time of discharge.     Activity: activity as tolerated, fall precautions.     Total time taken for discharging this patient: 40 minutes. Greater than 70% of time was spent focused exclusively on this patient. Time was taken to review chart, discuss plans with consultants, reconciling medications, discussing plan answering questions with patient.     Signed:  TRUDY CAMPOS MD  7/21/2024, 4:38

## 2024-07-21 NOTE — ACP (ADVANCE CARE PLANNING)
Advance Care Planning   Healthcare Decision Maker:    Primary Decision Maker: Krys Almeida - Other - [phone]    Secondary Decision Maker: Live Esquivel - Brother/Sister - [phone]    Click here to complete Healthcare Decision Makers including selection of the Healthcare Decision Maker Relationship (ie \"Primary\").          Ref to Spiritual care for A.D.

## 2024-07-21 NOTE — PLAN OF CARE
Problem: Discharge Planning  Goal: Discharge to home or other facility with appropriate resources  Outcome: Progressing  Flowsheets (Taken 7/21/2024 0845)  Discharge to home or other facility with appropriate resources:   Identify barriers to discharge with patient and caregiver   Arrange for needed discharge resources and transportation as appropriate   Identify discharge learning needs (meds, wound care, etc)     Problem: Pain  Goal: Verbalizes/displays adequate comfort level or baseline comfort level  Outcome: Progressing  Flowsheets (Taken 7/21/2024 0845)  Verbalizes/displays adequate comfort level or baseline comfort level:   Encourage patient to monitor pain and request assistance   Assess pain using appropriate pain scale   Administer analgesics based on type and severity of pain and evaluate response     Problem: Safety - Adult  Goal: Free from fall injury  Outcome: Progressing  Flowsheets (Taken 7/21/2024 0845)  Free From Fall Injury: Instruct family/caregiver on patient safety     Problem: ABCDS Injury Assessment  Goal: Absence of physical injury  Outcome: Progressing     Problem: Chronic Conditions and Co-morbidities  Goal: Patient's chronic conditions and co-morbidity symptoms are monitored and maintained or improved  Outcome: Progressing  Flowsheets (Taken 7/21/2024 0845)  Care Plan - Patient's Chronic Conditions and Co-Morbidity Symptoms are Monitored and Maintained or Improved:   Monitor and assess patient's chronic conditions and comorbid symptoms for stability, deterioration, or improvement   Collaborate with multidisciplinary team to address chronic and comorbid conditions and prevent exacerbation or deterioration

## 2024-07-21 NOTE — DISCHARGE INSTR - DIET

## 2024-07-21 NOTE — PROGRESS NOTES
INPATIENT PROGRESS NOTES    PATIENT NAME: Gerardo Esquivel  MRN: [id number]  SERVICE DATE:  July 21, 2024   SERVICE TIME:  12:09 PM      PRIMARY SERVICE: Pulmonary Disease    CHIEF COMPLAIN: Left lung mass      INTERVAL HPI: Patient seen and examined at bedside, Interval Notes, orders reviewed. Nursing notes noted  Patient still coughing but no hemoptysis.  He has no fever.  No chills.  He does have a leukocytosis.  He said he has been coughing for months.  He had CT chest done for lung screening on May 24, 2024 and it also shows there is left-sided lung mass which was not reported and lung screening.  There is also small filling defect in lower lobes which also can be artifact versus pulmonary emboli and will have a repeat CT chest done.  If it does not show any pulmonary emboli then patient can be discharged home and will arrange for biopsy of left lung mass as outpatient.  I discussed with Dr. Sarkar         OBJECTIVE   I/O:24HR INTAKE/OUTPUT:    Intake/Output Summary (Last 24 hours) at 7/21/2024 1209  Last data filed at 7/21/2024 0845  Gross per 24 hour   Intake 600 ml   Output --   Net 600 ml     07/20 0701 - 07/21 0700  In: 610 [P.O.:600; I.V.:10]  Out: -   Body mass index is 35.93 kg/m².    PHYSICAL EXAM:  Vitals:  /82   Pulse 66   Temp 98.2 °F (36.8 °C) (Oral)   Resp 18   Ht 1.778 m (5' 10\")   Wt 113.6 kg (250 lb 7.1 oz)   SpO2 96%   BMI 35.93 kg/m²     General: Alert, awake .comfortable in bed, No distress.  Head: Atraumatic , Normocephalic   Eyes: PERRL. No sclera icterus. No conjunctival injection. No discharge   ENT: No nasal  discharge. Pharynx clear.  Neck:  Trachea midline. No thyromegaly, no JVD, No cervical adenopathy.  Chest : Bilaterally symmetrical ,Normal effort,  No accessory muscle use  Lung : Diminished breath sound bilaterally No Rales. No wheezing. No rhonchi.   Heart:: Normal rate. Regular rhythm. No mumur ,  Rub or gallop  ABD: Non-tender. Non-distended. No masses. No  voice recognition software. Every effort was made to ensure accuracy; however, inadvertent computerized transcription errors may be present.      Electronically signed by Nicholas Malcolm MD, FCCP on 7/21/2024 at 12:09 PM

## 2024-07-22 ENCOUNTER — PREP FOR PROCEDURE (OUTPATIENT)
Dept: PULMONOLOGY | Age: 69
End: 2024-07-22

## 2024-07-22 DIAGNOSIS — R91.8 LUNG MASS: ICD-10-CM

## 2024-07-22 LAB
EKG ATRIAL RATE: 70 BPM
EKG P AXIS: 58 DEGREES
EKG P-R INTERVAL: 252 MS
EKG Q-T INTERVAL: 412 MS
EKG QRS DURATION: 98 MS
EKG QTC CALCULATION (BAZETT): 444 MS
EKG R AXIS: 3 DEGREES
EKG T AXIS: 32 DEGREES
EKG VENTRICULAR RATE: 70 BPM

## 2024-07-24 ENCOUNTER — OFFICE VISIT (OUTPATIENT)
Dept: PULMONOLOGY | Age: 69
End: 2024-07-24
Payer: MEDICARE

## 2024-07-24 VITALS
WEIGHT: 256 LBS | OXYGEN SATURATION: 95 % | HEART RATE: 77 BPM | DIASTOLIC BLOOD PRESSURE: 58 MMHG | BODY MASS INDEX: 36.73 KG/M2 | SYSTOLIC BLOOD PRESSURE: 116 MMHG

## 2024-07-24 DIAGNOSIS — R05.3 CHRONIC COUGH: ICD-10-CM

## 2024-07-24 DIAGNOSIS — R91.8 LUNG MASS: Primary | ICD-10-CM

## 2024-07-24 PROCEDURE — 99214 OFFICE O/P EST MOD 30 MIN: CPT | Performed by: INTERNAL MEDICINE

## 2024-07-24 PROCEDURE — 1123F ACP DISCUSS/DSCN MKR DOCD: CPT | Performed by: INTERNAL MEDICINE

## 2024-07-24 PROCEDURE — 1036F TOBACCO NON-USER: CPT | Performed by: INTERNAL MEDICINE

## 2024-07-24 PROCEDURE — 3017F COLORECTAL CA SCREEN DOC REV: CPT | Performed by: INTERNAL MEDICINE

## 2024-07-24 PROCEDURE — G8427 DOCREV CUR MEDS BY ELIG CLIN: HCPCS | Performed by: INTERNAL MEDICINE

## 2024-07-24 PROCEDURE — G8417 CALC BMI ABV UP PARAM F/U: HCPCS | Performed by: INTERNAL MEDICINE

## 2024-07-24 PROCEDURE — 1111F DSCHRG MED/CURRENT MED MERGE: CPT | Performed by: INTERNAL MEDICINE

## 2024-07-24 NOTE — PROGRESS NOTES
Subjective:             Gerardo Esquivel is a 69 y.o. male who complains today of:     Chief Complaint   Patient presents with    Follow-Up from Hospital     F/u on SOB, lung mass        HPI  He said he went  home 3 days ago from hospital .  He  still coughing but no hemoptysis. He said he has been coughing for months.  Mild shortness of breath , mostly with exertion.  He has no fever. No chills. He does have chronic  leukocytosis.      He had CT chest done for lung screening on May 24, 2024 and it also shows there is left-sided lung mass which was not reported and lung screening.     He is going to have bronch with EBUS , on Friday by Dr. Hu   he is only on ASA 81 mg , which he will stop.     He had splenectomy due to Hodgkin's at age of 15  He has chronic anxiety depression . Take Wellbutrin , lexapro and  Kobazepam     Allergies:  Doxycycline, Enoxaparin, Fenofibrate, Metformin and related, and Nateglinide  Past Medical History:   Diagnosis Date    COPD (chronic obstructive pulmonary disease) (HCC)     Depression     Hodgkin disease (HCC)     Hyperlipidemia     Hypertension     MRSA infection 9/2014    Left Groin     Sleep apnea     Thyroid cancer (HCC)     Type II or unspecified type diabetes mellitus without mention of complication, not stated as uncontrolled      Past Surgical History:   Procedure Laterality Date    ABDOMEN SURGERY      hodgkin     CHOLECYSTECTOMY      COLONOSCOPY      COLONOSCOPY N/A 11/22/2016    COLONOSCOPY performed by Saundra Blount MD at [facility]    COLONOSCOPY N/A 11/4/2021    COLORECTAL CANCER SCREENING, HIGH RISK WITH POLYPECTOMY performed by Saundra Blount MD at [facility]    ENDOSCOPY, COLON, DIAGNOSTIC      HERNIA REPAIR      umbilical    SPLENECTOMY, TOTAL      TOOTH EXTRACTION      TOTAL THYROIDECTOMY      CANCER     Family History   Problem Relation Age of Onset    Stroke Mother     Heart Disease Father     Colon Cancer Neg Hx      Social History

## 2024-07-25 NOTE — PROGRESS NOTES
Phone PAT completed, pt given pre-op instructions (per surgery booklet) and verbalized understanding. Pt instructed to take metoprolol AM of OR with SOW as well as albuterol inhaler if needed.  POC OT ordered for AM of OR.  Electronically signed by Sobeida Cotto RN on 7/25/2024 at 1:00 PM

## 2024-07-26 ENCOUNTER — APPOINTMENT (OUTPATIENT)
Dept: GENERAL RADIOLOGY | Age: 69
End: 2024-07-26
Attending: INTERNAL MEDICINE
Payer: MEDICARE

## 2024-07-26 ENCOUNTER — ANESTHESIA EVENT (OUTPATIENT)
Dept: OPERATING ROOM | Age: 69
End: 2024-07-26
Payer: MEDICARE

## 2024-07-26 ENCOUNTER — HOSPITAL ENCOUNTER (OUTPATIENT)
Age: 69
Setting detail: OUTPATIENT SURGERY
Discharge: HOME | End: 2024-07-26
Attending: INTERNAL MEDICINE | Admitting: INTERNAL MEDICINE
Payer: MEDICARE

## 2024-07-26 ENCOUNTER — ANESTHESIA (OUTPATIENT)
Dept: OPERATING ROOM | Age: 69
End: 2024-07-26
Payer: MEDICARE

## 2024-07-26 VITALS
HEART RATE: 74 BPM | BODY MASS INDEX: 36.65 KG/M2 | SYSTOLIC BLOOD PRESSURE: 135 MMHG | RESPIRATION RATE: 16 BRPM | OXYGEN SATURATION: 95 % | DIASTOLIC BLOOD PRESSURE: 63 MMHG | TEMPERATURE: 98 F | WEIGHT: 256 LBS | HEIGHT: 70 IN

## 2024-07-26 DIAGNOSIS — R91.8 LUNG MASS: ICD-10-CM

## 2024-07-26 DIAGNOSIS — R52 PAIN: ICD-10-CM

## 2024-07-26 LAB
GLUCOSE BLD-MCNC: 181 MG/DL (ref 70–99)
GLUCOSE BLD-MCNC: 184 MG/DL (ref 70–99)
INR PPP: 1
PERFORMED ON: ABNORMAL
PERFORMED ON: ABNORMAL
PROTHROMBIN TIME: 13.5 SEC (ref 12.3–14.9)

## 2024-07-26 PROCEDURE — 3603165200 HC BRNCHSC EBUS GUIDED SAMPL 1/2 NODE STATION/STRUX: Performed by: INTERNAL MEDICINE

## 2024-07-26 PROCEDURE — 85610 PROTHROMBIN TIME: CPT

## 2024-07-26 PROCEDURE — 2580000003 HC RX 258: Performed by: INTERNAL MEDICINE

## 2024-07-26 PROCEDURE — 88173 CYTOPATH EVAL FNA REPORT: CPT

## 2024-07-26 PROCEDURE — 87102 FUNGUS ISOLATION CULTURE: CPT

## 2024-07-26 PROCEDURE — 7100000011 HC PHASE II RECOVERY - ADDTL 15 MIN: Performed by: INTERNAL MEDICINE

## 2024-07-26 PROCEDURE — 88342 IMHCHEM/IMCYTCHM 1ST ANTB: CPT

## 2024-07-26 PROCEDURE — 87205 SMEAR GRAM STAIN: CPT

## 2024-07-26 PROCEDURE — 7100000001 HC PACU RECOVERY - ADDTL 15 MIN: Performed by: INTERNAL MEDICINE

## 2024-07-26 PROCEDURE — 2720000010 HC SURG SUPPLY STERILE: Performed by: INTERNAL MEDICINE

## 2024-07-26 PROCEDURE — 7100000000 HC PACU RECOVERY - FIRST 15 MIN: Performed by: INTERNAL MEDICINE

## 2024-07-26 PROCEDURE — 87070 CULTURE OTHR SPECIMN AEROBIC: CPT

## 2024-07-26 PROCEDURE — 88305 TISSUE EXAM BY PATHOLOGIST: CPT

## 2024-07-26 PROCEDURE — 88112 CYTOPATH CELL ENHANCE TECH: CPT

## 2024-07-26 PROCEDURE — 88341 IMHCHEM/IMCYTCHM EA ADD ANTB: CPT

## 2024-07-26 PROCEDURE — 89051 BODY FLUID CELL COUNT: CPT

## 2024-07-26 PROCEDURE — A4217 STERILE WATER/SALINE, 500 ML: HCPCS | Performed by: INTERNAL MEDICINE

## 2024-07-26 PROCEDURE — 87077 CULTURE AEROBIC IDENTIFY: CPT

## 2024-07-26 PROCEDURE — 2580000003 HC RX 258: Performed by: NURSE PRACTITIONER

## 2024-07-26 PROCEDURE — 2500000003 HC RX 250 WO HCPCS: Performed by: INTERNAL MEDICINE

## 2024-07-26 PROCEDURE — 2709999900 HC NON-CHARGEABLE SUPPLY: Performed by: INTERNAL MEDICINE

## 2024-07-26 PROCEDURE — 87116 MYCOBACTERIA CULTURE: CPT

## 2024-07-26 PROCEDURE — 87186 SC STD MICRODIL/AGAR DIL: CPT

## 2024-07-26 PROCEDURE — 6360000002 HC RX W HCPCS: Performed by: ANESTHESIOLOGIST ASSISTANT

## 2024-07-26 PROCEDURE — 2500000003 HC RX 250 WO HCPCS: Performed by: ANESTHESIOLOGIST ASSISTANT

## 2024-07-26 PROCEDURE — 31625 BRONCHOSCOPY W/BIOPSY(S): CPT | Performed by: INTERNAL MEDICINE

## 2024-07-26 PROCEDURE — 3700000000 HC ANESTHESIA ATTENDED CARE: Performed by: INTERNAL MEDICINE

## 2024-07-26 PROCEDURE — 31624 DX BRONCHOSCOPE/LAVAGE: CPT | Performed by: INTERNAL MEDICINE

## 2024-07-26 PROCEDURE — 3700000001 HC ADD 15 MINUTES (ANESTHESIA): Performed by: INTERNAL MEDICINE

## 2024-07-26 PROCEDURE — 7100000010 HC PHASE II RECOVERY - FIRST 15 MIN: Performed by: INTERNAL MEDICINE

## 2024-07-26 PROCEDURE — 31653 BRONCH EBUS SAMPLNG 3/> NODE: CPT | Performed by: INTERNAL MEDICINE

## 2024-07-26 RX ORDER — METOCLOPRAMIDE HYDROCHLORIDE 5 MG/ML
10 INJECTION INTRAMUSCULAR; INTRAVENOUS
Status: DISCONTINUED | OUTPATIENT
Start: 2024-07-26 | End: 2024-07-26 | Stop reason: HOSPADM

## 2024-07-26 RX ORDER — SODIUM CHLORIDE 0.9 % (FLUSH) 0.9 %
5-40 SYRINGE (ML) INJECTION PRN
Status: DISCONTINUED | OUTPATIENT
Start: 2024-07-26 | End: 2024-07-26 | Stop reason: HOSPADM

## 2024-07-26 RX ORDER — SODIUM CHLORIDE 0.9 % (FLUSH) 0.9 %
5-40 SYRINGE (ML) INJECTION EVERY 12 HOURS SCHEDULED
Status: DISCONTINUED | OUTPATIENT
Start: 2024-07-26 | End: 2024-07-26 | Stop reason: HOSPADM

## 2024-07-26 RX ORDER — SODIUM CHLORIDE 9 MG/ML
INJECTION, SOLUTION INTRAVENOUS PRN
Status: DISCONTINUED | OUTPATIENT
Start: 2024-07-26 | End: 2024-07-26 | Stop reason: HOSPADM

## 2024-07-26 RX ORDER — LABETALOL HYDROCHLORIDE 5 MG/ML
10 INJECTION, SOLUTION INTRAVENOUS
Status: DISCONTINUED | OUTPATIENT
Start: 2024-07-26 | End: 2024-07-26 | Stop reason: HOSPADM

## 2024-07-26 RX ORDER — ONDANSETRON 2 MG/ML
INJECTION INTRAMUSCULAR; INTRAVENOUS PRN
Status: DISCONTINUED | OUTPATIENT
Start: 2024-07-26 | End: 2024-07-26 | Stop reason: SDUPTHER

## 2024-07-26 RX ORDER — ONDANSETRON 2 MG/ML
4 INJECTION INTRAMUSCULAR; INTRAVENOUS
Status: DISCONTINUED | OUTPATIENT
Start: 2024-07-26 | End: 2024-07-26 | Stop reason: HOSPADM

## 2024-07-26 RX ORDER — LIDOCAINE HYDROCHLORIDE 10 MG/ML
1 INJECTION, SOLUTION EPIDURAL; INFILTRATION; INTRACAUDAL; PERINEURAL
Status: DISCONTINUED | OUTPATIENT
Start: 2024-07-26 | End: 2024-07-26 | Stop reason: HOSPADM

## 2024-07-26 RX ORDER — PROPOFOL 10 MG/ML
INJECTION, EMULSION INTRAVENOUS PRN
Status: DISCONTINUED | OUTPATIENT
Start: 2024-07-26 | End: 2024-07-26 | Stop reason: SDUPTHER

## 2024-07-26 RX ORDER — SODIUM CHLORIDE 9 MG/ML
INJECTION, SOLUTION INTRAVENOUS CONTINUOUS
Status: DISCONTINUED | OUTPATIENT
Start: 2024-07-26 | End: 2024-07-26 | Stop reason: HOSPADM

## 2024-07-26 RX ORDER — LIDOCAINE HYDROCHLORIDE 20 MG/ML
INJECTION, SOLUTION INTRAVENOUS PRN
Status: DISCONTINUED | OUTPATIENT
Start: 2024-07-26 | End: 2024-07-26 | Stop reason: SDUPTHER

## 2024-07-26 RX ORDER — ROCURONIUM BROMIDE 10 MG/ML
INJECTION, SOLUTION INTRAVENOUS PRN
Status: DISCONTINUED | OUTPATIENT
Start: 2024-07-26 | End: 2024-07-26 | Stop reason: SDUPTHER

## 2024-07-26 RX ORDER — DEXTROSE MONOHYDRATE 100 MG/ML
INJECTION, SOLUTION INTRAVENOUS CONTINUOUS PRN
Status: DISCONTINUED | OUTPATIENT
Start: 2024-07-26 | End: 2024-07-26 | Stop reason: HOSPADM

## 2024-07-26 RX ORDER — FENTANYL CITRATE 0.05 MG/ML
25 INJECTION, SOLUTION INTRAMUSCULAR; INTRAVENOUS EVERY 5 MIN PRN
Status: DISCONTINUED | OUTPATIENT
Start: 2024-07-26 | End: 2024-07-26 | Stop reason: HOSPADM

## 2024-07-26 RX ORDER — OXYCODONE HYDROCHLORIDE 5 MG/1
5 TABLET ORAL PRN
Status: DISCONTINUED | OUTPATIENT
Start: 2024-07-26 | End: 2024-07-26 | Stop reason: HOSPADM

## 2024-07-26 RX ORDER — MEPERIDINE HYDROCHLORIDE 25 MG/ML
12.5 INJECTION INTRAMUSCULAR; INTRAVENOUS; SUBCUTANEOUS EVERY 5 MIN PRN
Status: DISCONTINUED | OUTPATIENT
Start: 2024-07-26 | End: 2024-07-26 | Stop reason: HOSPADM

## 2024-07-26 RX ORDER — DEXAMETHASONE SODIUM PHOSPHATE 10 MG/ML
INJECTION INTRAMUSCULAR; INTRAVENOUS PRN
Status: DISCONTINUED | OUTPATIENT
Start: 2024-07-26 | End: 2024-07-26 | Stop reason: SDUPTHER

## 2024-07-26 RX ORDER — NALOXONE HYDROCHLORIDE 0.4 MG/ML
INJECTION, SOLUTION INTRAMUSCULAR; INTRAVENOUS; SUBCUTANEOUS PRN
Status: DISCONTINUED | OUTPATIENT
Start: 2024-07-26 | End: 2024-07-26 | Stop reason: HOSPADM

## 2024-07-26 RX ORDER — LIDOCAINE HYDROCHLORIDE 20 MG/ML
INJECTION, SOLUTION EPIDURAL; INFILTRATION; INTRACAUDAL; PERINEURAL PRN
Status: DISCONTINUED | OUTPATIENT
Start: 2024-07-26 | End: 2024-07-26 | Stop reason: HOSPADM

## 2024-07-26 RX ORDER — SODIUM CHLORIDE, SODIUM LACTATE, POTASSIUM CHLORIDE, CALCIUM CHLORIDE 600; 310; 30; 20 MG/100ML; MG/100ML; MG/100ML; MG/100ML
INJECTION, SOLUTION INTRAVENOUS CONTINUOUS
Status: DISCONTINUED | OUTPATIENT
Start: 2024-07-26 | End: 2024-07-26 | Stop reason: HOSPADM

## 2024-07-26 RX ORDER — GLUCAGON 1 MG/ML
1 KIT INJECTION PRN
Status: DISCONTINUED | OUTPATIENT
Start: 2024-07-26 | End: 2024-07-26 | Stop reason: HOSPADM

## 2024-07-26 RX ORDER — IPRATROPIUM BROMIDE AND ALBUTEROL SULFATE 2.5; .5 MG/3ML; MG/3ML
1 SOLUTION RESPIRATORY (INHALATION)
Status: DISCONTINUED | OUTPATIENT
Start: 2024-07-26 | End: 2024-07-26 | Stop reason: HOSPADM

## 2024-07-26 RX ORDER — FENTANYL CITRATE 50 UG/ML
INJECTION, SOLUTION INTRAMUSCULAR; INTRAVENOUS PRN
Status: DISCONTINUED | OUTPATIENT
Start: 2024-07-26 | End: 2024-07-26 | Stop reason: SDUPTHER

## 2024-07-26 RX ORDER — MIDAZOLAM HYDROCHLORIDE 1 MG/ML
INJECTION INTRAMUSCULAR; INTRAVENOUS PRN
Status: DISCONTINUED | OUTPATIENT
Start: 2024-07-26 | End: 2024-07-26 | Stop reason: SDUPTHER

## 2024-07-26 RX ORDER — MAGNESIUM HYDROXIDE 1200 MG/15ML
LIQUID ORAL CONTINUOUS PRN
Status: DISCONTINUED | OUTPATIENT
Start: 2024-07-26 | End: 2024-07-26 | Stop reason: HOSPADM

## 2024-07-26 RX ORDER — HYDRALAZINE HYDROCHLORIDE 20 MG/ML
10 INJECTION INTRAMUSCULAR; INTRAVENOUS
Status: DISCONTINUED | OUTPATIENT
Start: 2024-07-26 | End: 2024-07-26 | Stop reason: HOSPADM

## 2024-07-26 RX ORDER — OXYCODONE HYDROCHLORIDE 5 MG/1
10 TABLET ORAL PRN
Status: DISCONTINUED | OUTPATIENT
Start: 2024-07-26 | End: 2024-07-26 | Stop reason: HOSPADM

## 2024-07-26 RX ADMIN — DEXAMETHASONE SODIUM PHOSPHATE 10 MG: 10 INJECTION INTRAMUSCULAR; INTRAVENOUS at 08:38

## 2024-07-26 RX ADMIN — PROPOFOL 30 MG: 10 INJECTION, EMULSION INTRAVENOUS at 08:36

## 2024-07-26 RX ADMIN — PROPOFOL 170 MG: 10 INJECTION, EMULSION INTRAVENOUS at 08:26

## 2024-07-26 RX ADMIN — LIDOCAINE HYDROCHLORIDE 70 MG: 20 INJECTION, SOLUTION INTRAVENOUS at 08:26

## 2024-07-26 RX ADMIN — ONDANSETRON 4 MG: 2 INJECTION INTRAMUSCULAR; INTRAVENOUS at 09:14

## 2024-07-26 RX ADMIN — SODIUM CHLORIDE, POTASSIUM CHLORIDE, SODIUM LACTATE AND CALCIUM CHLORIDE: 600; 310; 30; 20 INJECTION, SOLUTION INTRAVENOUS at 07:39

## 2024-07-26 RX ADMIN — PHENYLEPHRINE HYDROCHLORIDE 150 MCG: 10 INJECTION INTRAVENOUS at 08:58

## 2024-07-26 RX ADMIN — SUGAMMADEX 200 MG: 100 INJECTION, SOLUTION INTRAVENOUS at 09:14

## 2024-07-26 RX ADMIN — MIDAZOLAM HYDROCHLORIDE 2 MG: 1 INJECTION, SOLUTION INTRAMUSCULAR; INTRAVENOUS at 08:20

## 2024-07-26 RX ADMIN — ROCURONIUM BROMIDE 70 MG: 10 INJECTION, SOLUTION INTRAVENOUS at 08:26

## 2024-07-26 RX ADMIN — PHENYLEPHRINE HYDROCHLORIDE 150 MCG: 10 INJECTION INTRAVENOUS at 08:49

## 2024-07-26 RX ADMIN — FENTANYL CITRATE 50 MCG: 50 INJECTION, SOLUTION INTRAMUSCULAR; INTRAVENOUS at 08:26

## 2024-07-26 RX ADMIN — PHENYLEPHRINE HYDROCHLORIDE 100 MCG: 10 INJECTION INTRAVENOUS at 08:54

## 2024-07-26 RX ADMIN — FENTANYL CITRATE 50 MCG: 50 INJECTION, SOLUTION INTRAMUSCULAR; INTRAVENOUS at 08:36

## 2024-07-26 ASSESSMENT — PAIN SCALES - GENERAL
PAINLEVEL_OUTOF10: 0

## 2024-07-26 ASSESSMENT — ENCOUNTER SYMPTOMS
GASTROINTESTINAL NEGATIVE: 1
EYES NEGATIVE: 1

## 2024-07-26 NOTE — FLOWSHEET NOTE
Patient discharged via wheelchair to car with wife. Stable at discharge, all belongings in possession.

## 2024-07-26 NOTE — ANESTHESIA PRE PROCEDURE
Department of Anesthesiology  Preprocedure Note       Name:  Gerardo Esquivel   Age:  69 y.o.  :  1955                                          MRN:  [id number]         Date:  2024      Surgeon: Surgeon(s):  Cassidy Hu MD    Procedure: Procedure(s):  Navigational Bronchoscopy with biopsies EBUS C-ARM e-mailed [facility]WARE    Medications prior to admission:   Prior to Admission medications    Medication Sig Start Date End Date Taking? Authorizing Provider   albuterol sulfate HFA (PROVENTIL;VENTOLIN;PROAIR) 108 (90 Base) MCG/ACT inhaler Inhale 2 puffs into the lungs every 4 hours as needed for Wheezing or Shortness of Breath    Nelda Allan MD   escitalopram (LEXAPRO) 10 MG tablet Take 0.5 tablets by mouth daily    Nelda Allan MD BREO ELLIPTA 100-25 MCG/ACT inhaler Inhale 1 puff into the lungs daily  Patient not taking: Reported on 2024    Nelda Allan MD   WESTAB ONE 2.5-25-1 MG TABS tablet Take 1 tablet by mouth daily    Nelda Allan MD   insulin aspart (NOVOLOG FLEXPEN) 100 UNIT/ML injection pen Inject 30 Units into the skin 3 times daily (before meals) 23   Nelda Allan MD   Insulin Degludec 200 UNIT/ML SOPN Inject 54 Units into the skin nightly 23   Nelda Allan MD   triamcinolone (ARISTOCORT) 0.5 % cream Apply 1 Application topically 2 times daily as needed (rash)    Nelda Allan MD   clonazePAM (KLONOPIN) 0.5 MG tablet Take 1 tablet by mouth 2 times daily as needed for Anxiety. 24   Nelda Allan MD   levothyroxine (SYNTHROID) 150 MCG tablet Take 1 tablet by mouth Daily 24   Nelda Allan MD   tamsulosin (FLOMAX) 0.4 MG capsule Take 1 capsule by mouth daily    Nelda Allan MD   gabapentin (NEURONTIN) 300 MG capsule Take 1 capsule by mouth 3 times daily.    Nelda Allan MD   aspirin 81 MG EC tablet Take 1 tablet by mouth daily    Nelda Allan MD   metoprolol (TOPROL-XL)

## 2024-07-26 NOTE — ANESTHESIA POSTPROCEDURE EVALUATION
Department of Anesthesiology  Postprocedure Note    Patient: Gerardo Esquivel  MRN: [id number]  YOB: 1955  Date of evaluation: 7/26/2024    Procedure Summary       Date: 07/26/24 Room / Location: [address]    Anesthesia Start: 0820 Anesthesia Stop:     Procedure: Navigational Bronchoscopy with biopsies EBUS C-ARM (Mouth) Diagnosis:       Lung mass      (Lung mass [R91.8])    Surgeons: Cassidy Hu MD Responsible Provider: Simone Bliss MD    Anesthesia Type: general ASA Status: 3            Anesthesia Type: No value filed.    Cheo Phase I:      Cheo Phase II:      Anesthesia Post Evaluation    Patient location during evaluation: bedside  Patient participation: complete - patient participated  Level of consciousness: awake and awake and alert  Airway patency: patent  Nausea & Vomiting: no nausea and no vomiting  Cardiovascular status: blood pressure returned to baseline and hemodynamically stable  Respiratory status: acceptable  Hydration status: euvolemic  Pain management: adequate        No notable events documented.

## 2024-07-26 NOTE — PROGRESS NOTES
1024 - discharge instructions provided to patient and girlfriend - no further questions or concerns at this time.     Patient demonstrated ability to utilize incentive spirometer.     1035 - Patient getting dressed with girlfriend assist - patient denied lightheadedness/dizziness.     1042 - Patient discharged via wheelchair to car with wife.

## 2024-07-26 NOTE — H&P
HISTORY AND PHYSICAL             Date: 7/26/2024        Patient Name: Gerardo Esquivel     YOB: 1955      Age:  69 y.o.    Chief Complaint   No chief complaint on file.     ”Lung mass”    History Obtained From   patient    History of Present Illness   No chest pain, no shortness of breath, presents for lung mass and biopsy    Past Medical History     Past Medical History:   Diagnosis Date    CAD (coronary artery disease)     Chronic cough     CKD (chronic kidney disease), stage III (HCC)     COPD (chronic obstructive pulmonary disease) (HCC)     Depression     H/O pneumothorax     x 2    Hodgkin disease (HCC)     Hyperlipidemia     Hypertension     Hypothyroid     Lung mass     MRSA infection 09/2014    Left Groin     Pulmonary embolism (HCC)     Sleep apnea     Thyroid cancer (HCC)     Type II or unspecified type diabetes mellitus without mention of complication, not stated as uncontrolled         Past Surgical History     Past Surgical History:   Procedure Laterality Date    ABDOMEN SURGERY      hodgkin     CATARACT REMOVAL Bilateral     CHOLECYSTECTOMY      COLONOSCOPY      COLONOSCOPY N/A 11/22/2016    COLONOSCOPY performed by Saundra Blount MD at [facility]    COLONOSCOPY N/A 11/04/2021    COLORECTAL CANCER SCREENING, HIGH RISK WITH POLYPECTOMY performed by Saundra Blount MD at [facility]    ENDOSCOPY, COLON, DIAGNOSTIC      HERNIA REPAIR      umbilical    SKIN BIOPSY      SPLENECTOMY, TOTAL      THORACOSCOPY W/ TALC PLEURODESIS Right     x 2    TOOTH EXTRACTION      TOTAL THYROIDECTOMY      CANCER        Medications Prior to Admission     Prior to Admission medications    Medication Sig Start Date End Date Taking? Authorizing Provider   albuterol sulfate HFA (PROVENTIL;VENTOLIN;PROAIR) 108 (90 Base) MCG/ACT inhaler Inhale 2 puffs into the lungs every 4 hours as needed for Wheezing or Shortness of Breath    Provider, MD Nelda   escitalopram (LEXAPRO) 10 MG tablet Take 0.5  tablets by mouth daily    Nelda Allan MD BREO ELLIPTA 100-25 MCG/ACT inhaler Inhale 1 puff into the lungs daily  Patient not taking: Reported on 7/25/2024    Nelda Allan MD   WESTAB ONE 2.5-25-1 MG TABS tablet Take 1 tablet by mouth daily    Nelda Allan MD   insulin aspart (NOVOLOG FLEXPEN) 100 UNIT/ML injection pen Inject 30 Units into the skin 3 times daily (before meals) 2/16/23   Nelda Allan MD   Insulin Degludec 200 UNIT/ML SOPN Inject 54 Units into the skin nightly 2/16/23   Nelda Allan MD   triamcinolone (ARISTOCORT) 0.5 % cream Apply 1 Application topically 2 times daily as needed (rash)    Nelda Allan MD   clonazePAM (KLONOPIN) 0.5 MG tablet Take 1 tablet by mouth 2 times daily as needed for Anxiety. 6/5/24   Nelda Allan MD   levothyroxine (SYNTHROID) 150 MCG tablet Take 1 tablet by mouth Daily 5/9/24   Nelda Allan MD   tamsulosin (FLOMAX) 0.4 MG capsule Take 1 capsule by mouth daily    Nelda Allan MD   gabapentin (NEURONTIN) 300 MG capsule Take 1 capsule by mouth 3 times daily.    Nelda Allan MD   aspirin 81 MG EC tablet Take 1 tablet by mouth daily    Nelda Allan MD   metoprolol (TOPROL-XL) 25 MG XL tablet Take 1 tablet by mouth daily 8/13/14   Nelda Allan MD   atorvastatin (LIPITOR) 40 MG tablet Take 1 tablet by mouth daily    Nelda Allan MD   buPROPion SR (WELLBUTRIN SR) 150 MG SR tablet Take 1 tablet by mouth 2 times daily    Nelda Allan MD   lisinopril (PRINIVIL;ZESTRIL) 10 MG tablet Take 1 tablet by mouth daily    Nelda Allan MD        Allergies   Doxycycline, Enoxaparin, Fenofibrate, Metformin and related, and Nateglinide    Social History     Social History       Tobacco History       Smoking Status  Former Smoking Start Date  1973 Quit Date  2008 Average Packs/Day  2.0 packs/day for 35.0 years (70.0 ttl pk-yrs) Smoking Tobacco Type  Cigarettes from

## 2024-07-26 NOTE — OP NOTE
Operative Note      Patient: Gerardo Esquivel  YOB: 1955  MRN: [id number]    Date of Procedure: 7/26/2024    Pre-Op Diagnosis Codes:     * Lung mass [R91.8]    Post-Op Diagnosis: Same       Procedure(s):  EBUS WITH BIOPSYS    Surgeon(s):  Cassidy Hu MD    Assistant:   First Assistant: Nanci Osullivan    Anesthesia: General    Estimated Blood Loss (mL): Minimal    Complications: None    Specimens:   ID Type Source Tests Collected by Time Destination   1 : BRONCHOALVEOLAR LAVAGE (BAL) LEFT LOWER LOBE Body Fluid Lung CELL COUNT WITH DIFFERENTIAL, BODY FLUID, CULTURE, FUNGUS, GRAM STAIN, CULTURE WITH SMEAR, ACID FAST BACILLIUS, CULTURE, RESPIRATORY Cassidy Hu MD 7/26/2024 0909    A : 50/50 BAL OF LLL Body Fluid Lung CYTOLOGY, NON-GYN Cassidy Hu MD 7/26/2024 0804    C : BIOPSY LEFT LOWER LOBE Tissue Lung SURGICAL PATHOLOGY Cassidy Hu MD 7/26/2024 0911    D : LYMPH NODE RAPID READ STATION 7 Tissue Lymph Node CYTOLOGY, NON-GYN Cassidy Hu MD 7/26/2024 0804    E : LYMPH NODE STATION 7 Tissue Lymph Node CYTOLOGY, NON-GYN Cassidy Hu MD 7/26/2024 0849    F : LYMPH NODE L 11 Tissue Lymph Node CYTOLOGY, NON-GYN Cassidy Hu MD 7/26/2024 0853    G : LYMPH NODE L 10 Tissue Lymph Node CYTOLOGY, NON-GYN Cassidy Hu MD 7/26/2024 0901        Implants:  * No implants in log *      Drains: * No LDAs found *    Findings:  Infection Present At Time Of Surgery (PATOS) (choose all levels that have infection present):  No infection present        Detailed Description of Procedure:   PROCEDURE:   Fiberoptic bronchoscopy with   1. Limited visual airway inspection.   2. EBUS-guided TBNA of stations 7, L10 and 11         DESCRIPTION OF PROCEDURE:      A curvilinear EBUS Olympus bronchoscope was inserted into ET tube to the tracheobronchial tree .     Limited visual airway inspection was then performed and no abnormality was noted in the trachea or mainstem bronchi.     The

## 2024-07-27 LAB
AFB STAIN: NORMAL
PRELIMINARY: NORMAL

## 2024-07-28 LAB
BACTERIA SPEC RESP CULT: ABNORMAL
BACTERIA SPEC RESP CULT: ABNORMAL
ORGANISM: ABNORMAL
PRELIMINARY: NORMAL

## 2024-07-29 ENCOUNTER — HOSPITAL ENCOUNTER (OUTPATIENT)
Dept: SLEEP CENTER | Age: 69
Discharge: HOME | End: 2024-07-31
Payer: MEDICARE

## 2024-07-29 PROCEDURE — 95811 POLYSOM 6/>YRS CPAP 4/> PARM: CPT

## 2024-07-31 ENCOUNTER — TRANSCRIBE ORDERS (OUTPATIENT)
Dept: ADMINISTRATIVE | Age: 69
End: 2024-07-31

## 2024-07-31 DIAGNOSIS — C34.11 MALIGNANT NEOPLASM OF UPPER LOBE OF RIGHT LUNG (HCC): Primary | ICD-10-CM

## 2024-08-08 ENCOUNTER — OFFICE VISIT (OUTPATIENT)
Dept: PULMONOLOGY | Age: 69
End: 2024-08-08
Payer: MEDICARE

## 2024-08-08 VITALS
SYSTOLIC BLOOD PRESSURE: 142 MMHG | DIASTOLIC BLOOD PRESSURE: 64 MMHG | WEIGHT: 256 LBS | BODY MASS INDEX: 36.73 KG/M2 | OXYGEN SATURATION: 95 % | HEART RATE: 76 BPM

## 2024-08-08 DIAGNOSIS — G47.33 OSA (OBSTRUCTIVE SLEEP APNEA): ICD-10-CM

## 2024-08-08 DIAGNOSIS — J44.9 CHRONIC OBSTRUCTIVE PULMONARY DISEASE, UNSPECIFIED COPD TYPE (HCC): ICD-10-CM

## 2024-08-08 DIAGNOSIS — R05.3 CHRONIC COUGH: ICD-10-CM

## 2024-08-08 DIAGNOSIS — R91.8 LUNG MASS: Primary | ICD-10-CM

## 2024-08-08 PROCEDURE — G8417 CALC BMI ABV UP PARAM F/U: HCPCS | Performed by: INTERNAL MEDICINE

## 2024-08-08 PROCEDURE — 1111F DSCHRG MED/CURRENT MED MERGE: CPT | Performed by: INTERNAL MEDICINE

## 2024-08-08 PROCEDURE — 3017F COLORECTAL CA SCREEN DOC REV: CPT | Performed by: INTERNAL MEDICINE

## 2024-08-08 PROCEDURE — 1036F TOBACCO NON-USER: CPT | Performed by: INTERNAL MEDICINE

## 2024-08-08 PROCEDURE — 99214 OFFICE O/P EST MOD 30 MIN: CPT | Performed by: INTERNAL MEDICINE

## 2024-08-08 PROCEDURE — 1123F ACP DISCUSS/DSCN MKR DOCD: CPT | Performed by: INTERNAL MEDICINE

## 2024-08-08 PROCEDURE — 3023F SPIROM DOC REV: CPT | Performed by: INTERNAL MEDICINE

## 2024-08-08 PROCEDURE — G8427 DOCREV CUR MEDS BY ELIG CLIN: HCPCS | Performed by: INTERNAL MEDICINE

## 2024-08-08 NOTE — PROGRESS NOTES
Subjective:             Gerardo Esquivel is a 69 y.o. male who complains today of:     Chief Complaint   Patient presents with    Follow-Up from Hospital     F/u from bronchoscopy with biopsies, SS results       HPI  He had bronch and found having NON-SMALL CELL CARCINOMA, FAVOR ADENOCARCINOMA .     He is following  with oncology      PET scan   Hypermetabolic left lower lobe perihilar mass consistent with  biopsy-positive adenocarcinoma, multiple metastatic lesions, lymphadenopathy, PET stage IV.   He is going to have blood test and decide about therapy.   He has  been albuterol HFA prn bases. He has neb with albuterol  prn     He had splenectomy due to Hodgkin's at age of 15  He has chronic anxiety depression .   Take Wellbutrin , lexapro and  Kobazepam     He has LANA  and on CPAP with 8 cm , he had CPAP study done and therapeutic CPAP at 7 cm       Allergies:  Doxycycline, Enoxaparin, Fenofibrate, Metformin and related, and Nateglinide  Past Medical History:   Diagnosis Date    CAD (coronary artery disease)     Chronic cough     CKD (chronic kidney disease), stage III (HCC)     COPD (chronic obstructive pulmonary disease) (HCC)     Depression     H/O pneumothorax     x 2    Hodgkin disease (HCC)     Hyperlipidemia     Hypertension     Hypothyroid     Lung mass     MRSA infection 09/2014    Left Groin     Pulmonary embolism (HCC)     Sleep apnea     Thyroid cancer (HCC)     Type II or unspecified type diabetes mellitus without mention of complication, not stated as uncontrolled      Past Surgical History:   Procedure Laterality Date    ABDOMEN SURGERY      hodgkin     BRONCHOSCOPY N/A 7/26/2024    EBUS WITH BIOPSYS performed by Cassidy Hu MD at [facility] OR    CATARACT REMOVAL Bilateral     CHOLECYSTECTOMY      COLONOSCOPY      COLONOSCOPY N/A 11/22/2016    COLONOSCOPY performed by Saundra Blount MD at [facility]    COLONOSCOPY N/A 11/04/2021    COLORECTAL CANCER SCREENING, HIGH RISK WITH POLYPECTOMY

## 2024-08-10 LAB — PRELIMINARY: NORMAL

## 2024-08-18 ENCOUNTER — HOSPITAL ENCOUNTER (OUTPATIENT)
Dept: MRI IMAGING | Age: 69
Discharge: HOME | End: 2024-08-20
Attending: INTERNAL MEDICINE
Payer: MEDICARE

## 2024-08-18 DIAGNOSIS — C34.11 MALIGNANT NEOPLASM OF UPPER LOBE OF RIGHT LUNG (HCC): ICD-10-CM

## 2024-08-18 PROCEDURE — 70553 MRI BRAIN STEM W/O & W/DYE: CPT

## 2024-08-18 PROCEDURE — 6360000004 HC RX CONTRAST MEDICATION: Performed by: INTERNAL MEDICINE

## 2024-08-18 PROCEDURE — A9577 INJ MULTIHANCE: HCPCS | Performed by: INTERNAL MEDICINE

## 2024-08-18 RX ADMIN — GADOBENATE DIMEGLUMINE 20 ML: 529 INJECTION, SOLUTION INTRAVENOUS at 11:45

## 2024-08-24 LAB
FINAL REPORT: NORMAL
PRELIMINARY: NORMAL

## 2024-08-25 ENCOUNTER — APPOINTMENT (OUTPATIENT)
Dept: GENERAL RADIOLOGY | Age: 69
End: 2024-08-25
Payer: MEDICARE

## 2024-08-25 ENCOUNTER — HOSPITAL ENCOUNTER (OUTPATIENT)
Age: 69
Setting detail: OBSERVATION
Discharge: HOME HEALTH | End: 2024-08-26
Attending: EMERGENCY MEDICINE | Admitting: INTERNAL MEDICINE
Payer: MEDICARE

## 2024-08-25 DIAGNOSIS — Z90.81 S/P SPLENECTOMY: ICD-10-CM

## 2024-08-25 DIAGNOSIS — N28.9 RENAL INSUFFICIENCY: ICD-10-CM

## 2024-08-25 DIAGNOSIS — R91.8 LUNG MASS: ICD-10-CM

## 2024-08-25 DIAGNOSIS — R52 PAIN MANAGEMENT: Primary | ICD-10-CM

## 2024-08-25 DIAGNOSIS — E86.0 DEHYDRATION: ICD-10-CM

## 2024-08-25 LAB
ALBUMIN SERPL-MCNC: 4 G/DL (ref 3.5–4.6)
ALP SERPL-CCNC: 184 U/L (ref 35–104)
ALT SERPL-CCNC: 25 U/L (ref 0–41)
ANION GAP SERPL CALCULATED.3IONS-SCNC: 13 MEQ/L (ref 9–15)
AST SERPL-CCNC: 28 U/L (ref 0–40)
BASOPHILS # BLD: 0 K/UL (ref 0–0.1)
BASOPHILS NFR BLD: 1.7 % (ref 0.2–1.2)
BILIRUB SERPL-MCNC: 0.8 MG/DL (ref 0.2–0.7)
BNP BLD-MCNC: 910 PG/ML
BUN SERPL-MCNC: 53 MG/DL (ref 8–23)
BURR CELLS: ABNORMAL
CALCIUM SERPL-MCNC: 10.4 MG/DL (ref 8.5–9.9)
CHLORIDE SERPL-SCNC: 92 MEQ/L (ref 95–107)
CO2 SERPL-SCNC: 28 MEQ/L (ref 20–31)
CREAT SERPL-MCNC: 1.58 MG/DL (ref 0.7–1.2)
EKG ATRIAL RATE: 101 BPM
EKG ATRIAL RATE: 104 BPM
EKG P AXIS: 42 DEGREES
EKG P AXIS: 46 DEGREES
EKG P-R INTERVAL: 214 MS
EKG P-R INTERVAL: 214 MS
EKG Q-T INTERVAL: 350 MS
EKG Q-T INTERVAL: 354 MS
EKG QRS DURATION: 96 MS
EKG QRS DURATION: 98 MS
EKG QTC CALCULATION (BAZETT): 459 MS
EKG QTC CALCULATION (BAZETT): 460 MS
EKG R AXIS: -7 DEGREES
EKG R AXIS: -8 DEGREES
EKG T AXIS: 40 DEGREES
EKG T AXIS: 73 DEGREES
EKG VENTRICULAR RATE: 101 BPM
EKG VENTRICULAR RATE: 104 BPM
EOSINOPHIL # BLD: 4.1 K/UL (ref 0–0.5)
EOSINOPHIL NFR BLD: 8 % (ref 0.8–7)
ERYTHROCYTE [DISTWIDTH] IN BLOOD BY AUTOMATED COUNT: 16.3 % (ref 11.6–14.4)
GLOBULIN SER CALC-MCNC: 2.3 G/DL (ref 2.3–3.5)
GLUCOSE BLD-MCNC: 207 MG/DL (ref 70–99)
GLUCOSE BLD-MCNC: 261 MG/DL (ref 70–99)
GLUCOSE SERPL-MCNC: 215 MG/DL (ref 70–99)
HCT VFR BLD AUTO: 43.8 % (ref 42–52)
HGB BLD-MCNC: 13.8 G/DL (ref 13.7–17.5)
IMM GRANULOCYTES # BLD: 2.8 K/UL
IMM GRANULOCYTES NFR BLD: 5.5 %
INR PPP: 1.3
LACTATE BLDV-SCNC: 1.7 MMOL/L (ref 0.5–2.2)
LYMPHOCYTES # BLD: 4.6 K/UL (ref 1.3–3.6)
LYMPHOCYTES NFR BLD: 8 %
MAGNESIUM SERPL-MCNC: 2.1 MG/DL (ref 1.7–2.4)
MCH RBC QN AUTO: 26 PG (ref 25.7–32.2)
MCHC RBC AUTO-ENTMCNC: 31.5 % (ref 32.3–36.5)
MCV RBC AUTO: 82.6 FL (ref 79–92.2)
METAMYELOCYTES NFR BLD MANUAL: 2 %
MONOCYTES # BLD: 3.1 K/UL (ref 0.3–0.8)
MONOCYTES NFR BLD: 6 % (ref 5.3–12.2)
MYELOCYTES NFR BLD MANUAL: 5 %
NEUTROPHILS # BLD: 39.3 K/UL (ref 1.8–5.4)
NEUTS BAND NFR BLD MANUAL: 32 %
NEUTS SEG NFR BLD: 38 % (ref 34–67.9)
OVALOCYTES BLD QL SMEAR: ABNORMAL
PATH INTERP BLD-IMP: YES
PERFORMED ON: ABNORMAL
PERFORMED ON: ABNORMAL
PLATELET # BLD AUTO: 168 K/UL (ref 163–337)
PLATELET BLD QL SMEAR: ABNORMAL
POLYCHROMASIA BLD QL SMEAR: ABNORMAL
POTASSIUM SERPL-SCNC: 4.8 MEQ/L (ref 3.4–4.9)
PROT SERPL-MCNC: 6.3 G/DL (ref 6.3–8)
PROTHROMBIN TIME: 16.4 SEC (ref 12.3–14.9)
RBC # BLD AUTO: 5.3 M/UL (ref 4.63–6.08)
SODIUM SERPL-SCNC: 133 MEQ/L (ref 135–144)
TROPONIN, HIGH SENSITIVITY: 30 NG/L (ref 0–19)
TROPONIN, HIGH SENSITIVITY: 31 NG/L (ref 0–19)
VARIANT LYMPHS NFR BLD: 1 %
WBC # BLD AUTO: 51.1 K/UL (ref 4.2–9)

## 2024-08-25 PROCEDURE — 6360000002 HC RX W HCPCS: Performed by: EMERGENCY MEDICINE

## 2024-08-25 PROCEDURE — 96374 THER/PROPH/DIAG INJ IV PUSH: CPT

## 2024-08-25 PROCEDURE — G0378 HOSPITAL OBSERVATION PER HR: HCPCS

## 2024-08-25 PROCEDURE — 85025 COMPLETE CBC W/AUTO DIFF WBC: CPT

## 2024-08-25 PROCEDURE — 94640 AIRWAY INHALATION TREATMENT: CPT

## 2024-08-25 PROCEDURE — 94664 DEMO&/EVAL PT USE INHALER: CPT

## 2024-08-25 PROCEDURE — 83880 ASSAY OF NATRIURETIC PEPTIDE: CPT

## 2024-08-25 PROCEDURE — 99285 EMERGENCY DEPT VISIT HI MDM: CPT

## 2024-08-25 PROCEDURE — 6370000000 HC RX 637 (ALT 250 FOR IP): Performed by: EMERGENCY MEDICINE

## 2024-08-25 PROCEDURE — 85610 PROTHROMBIN TIME: CPT

## 2024-08-25 PROCEDURE — 71045 X-RAY EXAM CHEST 1 VIEW: CPT

## 2024-08-25 PROCEDURE — 96375 TX/PRO/DX INJ NEW DRUG ADDON: CPT

## 2024-08-25 PROCEDURE — 83735 ASSAY OF MAGNESIUM: CPT

## 2024-08-25 PROCEDURE — 83605 ASSAY OF LACTIC ACID: CPT

## 2024-08-25 PROCEDURE — 96361 HYDRATE IV INFUSION ADD-ON: CPT

## 2024-08-25 PROCEDURE — 84484 ASSAY OF TROPONIN QUANT: CPT

## 2024-08-25 PROCEDURE — 36415 COLL VENOUS BLD VENIPUNCTURE: CPT

## 2024-08-25 PROCEDURE — 80053 COMPREHEN METABOLIC PANEL: CPT

## 2024-08-25 PROCEDURE — 96365 THER/PROPH/DIAG IV INF INIT: CPT

## 2024-08-25 PROCEDURE — 96372 THER/PROPH/DIAG INJ SC/IM: CPT

## 2024-08-25 PROCEDURE — 93005 ELECTROCARDIOGRAM TRACING: CPT

## 2024-08-25 PROCEDURE — 2580000003 HC RX 258: Performed by: INTERNAL MEDICINE

## 2024-08-25 PROCEDURE — 87040 BLOOD CULTURE FOR BACTERIA: CPT

## 2024-08-25 PROCEDURE — 6360000002 HC RX W HCPCS: Performed by: INTERNAL MEDICINE

## 2024-08-25 PROCEDURE — 87150 DNA/RNA AMPLIFIED PROBE: CPT

## 2024-08-25 PROCEDURE — 6370000000 HC RX 637 (ALT 250 FOR IP): Performed by: INTERNAL MEDICINE

## 2024-08-25 PROCEDURE — 2580000003 HC RX 258: Performed by: EMERGENCY MEDICINE

## 2024-08-25 RX ORDER — METOPROLOL SUCCINATE 50 MG/1
50 TABLET, EXTENDED RELEASE ORAL DAILY
Status: DISCONTINUED | OUTPATIENT
Start: 2024-08-25 | End: 2024-08-26

## 2024-08-25 RX ORDER — ACETAMINOPHEN 325 MG/1
650 TABLET ORAL EVERY 6 HOURS PRN
Status: DISCONTINUED | OUTPATIENT
Start: 2024-08-25 | End: 2024-08-26 | Stop reason: HOSPADM

## 2024-08-25 RX ORDER — FUROSEMIDE 20 MG
20 TABLET ORAL DAILY
COMMUNITY

## 2024-08-25 RX ORDER — PREDNISONE 10 MG/1
10 TABLET ORAL DAILY
COMMUNITY

## 2024-08-25 RX ORDER — MORPHINE SULFATE 4 MG/ML
4 INJECTION, SOLUTION INTRAMUSCULAR; INTRAVENOUS ONCE
Status: COMPLETED | OUTPATIENT
Start: 2024-08-25 | End: 2024-08-25

## 2024-08-25 RX ORDER — INSULIN GLARGINE 100 [IU]/ML
20 INJECTION, SOLUTION SUBCUTANEOUS 2 TIMES DAILY
Status: DISCONTINUED | OUTPATIENT
Start: 2024-08-25 | End: 2024-08-26 | Stop reason: HOSPADM

## 2024-08-25 RX ORDER — ENOXAPARIN SODIUM 100 MG/ML
30 INJECTION SUBCUTANEOUS DAILY
Status: DISCONTINUED | OUTPATIENT
Start: 2024-08-25 | End: 2024-08-26 | Stop reason: HOSPADM

## 2024-08-25 RX ORDER — INSULIN LISPRO 100 [IU]/ML
6 INJECTION, SOLUTION INTRAVENOUS; SUBCUTANEOUS
Status: DISCONTINUED | OUTPATIENT
Start: 2024-08-25 | End: 2024-08-26 | Stop reason: HOSPADM

## 2024-08-25 RX ORDER — SODIUM CHLORIDE 9 MG/ML
INJECTION, SOLUTION INTRAVENOUS PRN
Status: DISCONTINUED | OUTPATIENT
Start: 2024-08-25 | End: 2024-08-26 | Stop reason: HOSPADM

## 2024-08-25 RX ORDER — PANTOPRAZOLE SODIUM 40 MG/1
40 TABLET, DELAYED RELEASE ORAL DAILY
Status: DISCONTINUED | OUTPATIENT
Start: 2024-08-25 | End: 2024-08-26 | Stop reason: HOSPADM

## 2024-08-25 RX ORDER — PREDNISONE 10 MG/1
10 TABLET ORAL DAILY
Status: DISCONTINUED | OUTPATIENT
Start: 2024-08-26 | End: 2024-08-26 | Stop reason: HOSPADM

## 2024-08-25 RX ORDER — GABAPENTIN 300 MG/1
300 CAPSULE ORAL 3 TIMES DAILY
Status: DISCONTINUED | OUTPATIENT
Start: 2024-08-25 | End: 2024-08-26 | Stop reason: HOSPADM

## 2024-08-25 RX ORDER — ENOXAPARIN SODIUM 100 MG/ML
30 INJECTION SUBCUTANEOUS 2 TIMES DAILY
Status: DISCONTINUED | OUTPATIENT
Start: 2024-08-25 | End: 2024-08-25

## 2024-08-25 RX ORDER — DEXTROSE MONOHYDRATE 100 MG/ML
INJECTION, SOLUTION INTRAVENOUS CONTINUOUS PRN
Status: DISCONTINUED | OUTPATIENT
Start: 2024-08-25 | End: 2024-08-26 | Stop reason: HOSPADM

## 2024-08-25 RX ORDER — ESCITALOPRAM OXALATE 10 MG/1
5 TABLET ORAL DAILY
Status: DISCONTINUED | OUTPATIENT
Start: 2024-08-27 | End: 2024-08-26 | Stop reason: HOSPADM

## 2024-08-25 RX ORDER — IPRATROPIUM BROMIDE AND ALBUTEROL SULFATE 2.5; .5 MG/3ML; MG/3ML
1 SOLUTION RESPIRATORY (INHALATION) ONCE
Status: COMPLETED | OUTPATIENT
Start: 2024-08-25 | End: 2024-08-25

## 2024-08-25 RX ORDER — 0.9 % SODIUM CHLORIDE 0.9 %
1000 INTRAVENOUS SOLUTION INTRAVENOUS ONCE
Status: COMPLETED | OUTPATIENT
Start: 2024-08-25 | End: 2024-08-25

## 2024-08-25 RX ORDER — PANTOPRAZOLE SODIUM 40 MG/1
40 TABLET, DELAYED RELEASE ORAL DAILY
COMMUNITY

## 2024-08-25 RX ORDER — INSULIN LISPRO 100 [IU]/ML
0-4 INJECTION, SOLUTION INTRAVENOUS; SUBCUTANEOUS NIGHTLY
Status: DISCONTINUED | OUTPATIENT
Start: 2024-08-25 | End: 2024-08-26 | Stop reason: HOSPADM

## 2024-08-25 RX ORDER — POLYETHYLENE GLYCOL 3350 17 G/17G
17 POWDER, FOR SOLUTION ORAL DAILY PRN
Status: DISCONTINUED | OUTPATIENT
Start: 2024-08-25 | End: 2024-08-26 | Stop reason: HOSPADM

## 2024-08-25 RX ORDER — GLUCAGON 1 MG/ML
1 KIT INJECTION PRN
Status: DISCONTINUED | OUTPATIENT
Start: 2024-08-25 | End: 2024-08-26 | Stop reason: HOSPADM

## 2024-08-25 RX ORDER — INSULIN LISPRO 100 [IU]/ML
0-8 INJECTION, SOLUTION INTRAVENOUS; SUBCUTANEOUS
Status: DISCONTINUED | OUTPATIENT
Start: 2024-08-25 | End: 2024-08-26 | Stop reason: HOSPADM

## 2024-08-25 RX ORDER — CLONAZEPAM 0.5 MG/1
0.5 TABLET ORAL 2 TIMES DAILY PRN
Status: DISCONTINUED | OUTPATIENT
Start: 2024-08-25 | End: 2024-08-26 | Stop reason: HOSPADM

## 2024-08-25 RX ORDER — FUROSEMIDE 20 MG
20 TABLET ORAL DAILY
Status: DISCONTINUED | OUTPATIENT
Start: 2024-08-25 | End: 2024-08-26 | Stop reason: HOSPADM

## 2024-08-25 RX ORDER — TRAMADOL HYDROCHLORIDE 50 MG/1
50 TABLET ORAL EVERY 6 HOURS PRN
Status: DISCONTINUED | OUTPATIENT
Start: 2024-08-25 | End: 2024-08-26 | Stop reason: HOSPADM

## 2024-08-25 RX ORDER — DEXAMETHASONE SODIUM PHOSPHATE 10 MG/ML
10 INJECTION, SOLUTION INTRAMUSCULAR; INTRAVENOUS ONCE
Status: COMPLETED | OUTPATIENT
Start: 2024-08-25 | End: 2024-08-25

## 2024-08-25 RX ORDER — TAMSULOSIN HYDROCHLORIDE 0.4 MG/1
0.4 CAPSULE ORAL DAILY
Status: DISCONTINUED | OUTPATIENT
Start: 2024-08-25 | End: 2024-08-26 | Stop reason: HOSPADM

## 2024-08-25 RX ORDER — SODIUM CHLORIDE 0.9 % (FLUSH) 0.9 %
10 SYRINGE (ML) INJECTION PRN
Status: DISCONTINUED | OUTPATIENT
Start: 2024-08-25 | End: 2024-08-26 | Stop reason: HOSPADM

## 2024-08-25 RX ORDER — ONDANSETRON 2 MG/ML
4 INJECTION INTRAMUSCULAR; INTRAVENOUS ONCE
Status: COMPLETED | OUTPATIENT
Start: 2024-08-25 | End: 2024-08-25

## 2024-08-25 RX ORDER — ACETAMINOPHEN 650 MG/1
650 SUPPOSITORY RECTAL EVERY 6 HOURS PRN
Status: DISCONTINUED | OUTPATIENT
Start: 2024-08-25 | End: 2024-08-26 | Stop reason: HOSPADM

## 2024-08-25 RX ORDER — SODIUM CHLORIDE 0.9 % (FLUSH) 0.9 %
10 SYRINGE (ML) INJECTION EVERY 12 HOURS SCHEDULED
Status: DISCONTINUED | OUTPATIENT
Start: 2024-08-25 | End: 2024-08-26 | Stop reason: HOSPADM

## 2024-08-25 RX ORDER — LEVOTHYROXINE SODIUM 75 UG/1
150 TABLET ORAL DAILY
Status: DISCONTINUED | OUTPATIENT
Start: 2024-08-25 | End: 2024-08-26 | Stop reason: HOSPADM

## 2024-08-25 RX ORDER — ATORVASTATIN CALCIUM 40 MG/1
40 TABLET, FILM COATED ORAL DAILY
Status: DISCONTINUED | OUTPATIENT
Start: 2024-08-25 | End: 2024-08-26 | Stop reason: HOSPADM

## 2024-08-25 RX ORDER — BUPROPION HYDROCHLORIDE 150 MG/1
150 TABLET, EXTENDED RELEASE ORAL DAILY
Status: DISCONTINUED | OUTPATIENT
Start: 2024-08-26 | End: 2024-08-26 | Stop reason: HOSPADM

## 2024-08-25 RX ORDER — MELOXICAM 15 MG/1
15 TABLET ORAL DAILY
COMMUNITY

## 2024-08-25 RX ORDER — ALBUTEROL SULFATE 90 UG/1
2 AEROSOL, METERED RESPIRATORY (INHALATION) EVERY 4 HOURS PRN
Status: DISCONTINUED | OUTPATIENT
Start: 2024-08-25 | End: 2024-08-26 | Stop reason: HOSPADM

## 2024-08-25 RX ORDER — TRAMADOL HYDROCHLORIDE 50 MG/1
50 TABLET ORAL EVERY 6 HOURS PRN
COMMUNITY

## 2024-08-25 RX ADMIN — METOPROLOL SUCCINATE 50 MG: 50 TABLET, EXTENDED RELEASE ORAL at 17:49

## 2024-08-25 RX ADMIN — ENOXAPARIN SODIUM 30 MG: 100 INJECTION SUBCUTANEOUS at 17:51

## 2024-08-25 RX ADMIN — GABAPENTIN 300 MG: 300 CAPSULE ORAL at 20:50

## 2024-08-25 RX ADMIN — ONDANSETRON 4 MG: 2 INJECTION INTRAMUSCULAR; INTRAVENOUS at 11:41

## 2024-08-25 RX ADMIN — IPRATROPIUM BROMIDE AND ALBUTEROL SULFATE 1 DOSE: 2.5; .5 SOLUTION RESPIRATORY (INHALATION) at 11:59

## 2024-08-25 RX ADMIN — SODIUM CHLORIDE 1000 ML: 9 INJECTION, SOLUTION INTRAVENOUS at 11:41

## 2024-08-25 RX ADMIN — ACETAMINOPHEN 650 MG: 325 TABLET ORAL at 20:49

## 2024-08-25 RX ADMIN — PANTOPRAZOLE SODIUM 40 MG: 40 TABLET, DELAYED RELEASE ORAL at 17:50

## 2024-08-25 RX ADMIN — ATORVASTATIN CALCIUM 40 MG: 40 TABLET, FILM COATED ORAL at 17:50

## 2024-08-25 RX ADMIN — CLONAZEPAM 0.5 MG: 0.5 TABLET ORAL at 20:49

## 2024-08-25 RX ADMIN — SODIUM CHLORIDE, PRESERVATIVE FREE 10 ML: 5 INJECTION INTRAVENOUS at 20:50

## 2024-08-25 RX ADMIN — SODIUM CHLORIDE 1000 ML: 0.9 INJECTION, SOLUTION INTRAVENOUS at 13:15

## 2024-08-25 RX ADMIN — INSULIN LISPRO 4 UNITS: 100 INJECTION, SOLUTION INTRAVENOUS; SUBCUTANEOUS at 17:52

## 2024-08-25 RX ADMIN — TAMSULOSIN HYDROCHLORIDE 0.4 MG: 0.4 CAPSULE ORAL at 17:49

## 2024-08-25 RX ADMIN — TRAMADOL HYDROCHLORIDE 50 MG: 50 TABLET ORAL at 17:49

## 2024-08-25 RX ADMIN — MORPHINE SULFATE 4 MG: 4 INJECTION INTRAVENOUS at 11:42

## 2024-08-25 RX ADMIN — DEXAMETHASONE SODIUM PHOSPHATE 10 MG: 10 INJECTION, SOLUTION INTRAMUSCULAR; INTRAVENOUS at 11:42

## 2024-08-25 RX ADMIN — FUROSEMIDE 20 MG: 20 TABLET ORAL at 17:50

## 2024-08-25 RX ADMIN — INSULIN LISPRO 6 UNITS: 100 INJECTION, SOLUTION INTRAVENOUS; SUBCUTANEOUS at 17:52

## 2024-08-25 RX ADMIN — INSULIN GLARGINE 20 UNITS: 100 INJECTION, SOLUTION SUBCUTANEOUS at 20:52

## 2024-08-25 RX ADMIN — CEFTRIAXONE 1000 MG: 1 INJECTION, POWDER, FOR SOLUTION INTRAMUSCULAR; INTRAVENOUS at 18:44

## 2024-08-25 ASSESSMENT — PAIN DESCRIPTION - LOCATION
LOCATION: BACK
LOCATION: GENERALIZED;BACK
LOCATION: BACK
LOCATION: BACK

## 2024-08-25 ASSESSMENT — PAIN SCALES - GENERAL
PAINLEVEL_OUTOF10: 2
PAINLEVEL_OUTOF10: 8
PAINLEVEL_OUTOF10: 2
PAINLEVEL_OUTOF10: 2
PAINLEVEL_OUTOF10: 9
PAINLEVEL_OUTOF10: 5
PAINLEVEL_OUTOF10: 6

## 2024-08-25 ASSESSMENT — PAIN DESCRIPTION - ORIENTATION: ORIENTATION: UPPER

## 2024-08-25 ASSESSMENT — PAIN DESCRIPTION - DESCRIPTORS
DESCRIPTORS: SHARP
DESCRIPTORS: ACHING

## 2024-08-25 ASSESSMENT — PAIN - FUNCTIONAL ASSESSMENT
PAIN_FUNCTIONAL_ASSESSMENT: 0-10
PAIN_FUNCTIONAL_ASSESSMENT: ACTIVITIES ARE NOT PREVENTED

## 2024-08-25 ASSESSMENT — PAIN DESCRIPTION - PAIN TYPE: TYPE: ACUTE PAIN

## 2024-08-25 ASSESSMENT — LIFESTYLE VARIABLES
HOW OFTEN DO YOU HAVE A DRINK CONTAINING ALCOHOL: MONTHLY OR LESS
HOW MANY STANDARD DRINKS CONTAINING ALCOHOL DO YOU HAVE ON A TYPICAL DAY: PATIENT DOES NOT DRINK

## 2024-08-25 ASSESSMENT — PAIN DESCRIPTION - FREQUENCY: FREQUENCY: CONTINUOUS

## 2024-08-25 NOTE — PROGRESS NOTES
Pt admitted to room 222. PT alert and oriented. 3L NC noted. Wife at bedside. Call light within reach. Will continue to monitor

## 2024-08-25 NOTE — PROGRESS NOTES
Spoke with Lab and we do not have any anaerobic blood culture tubes, there is a shortage. Dr. Hunter notified

## 2024-08-25 NOTE — PROGRESS NOTES
DVT / VTE PROPHYLAXIS EVALUATION    Recent Labs     08/25/24  1054 08/25/24  1314   BUN 53*  --    CREATININE 1.58*  --      --    HGB 13.8  --    HCT 43.8  --    INR  --  1.3     ADMITTING DX OR CHIEF COMPLAINT? Pain  WARFARIN? DOAC'S? No  ANY APPARENT BLEEDING? No  SCHEDULED SURGERY? No     If yes to following, excluded from auto adjustment in Table 1 of policy - please contact provider with recommendations as appropriate.  Include condition/exception in scratch notes. Yes No   Trauma Service or Ortho Surgery []  [x]    Pregnancy []  [x]        Current order:  Enoxaparin 30 mg SUBQ once daily      Height: 177.8 cm (5' 10\"), Weight - Scale: 116.1 kg (256 lb)  Estimated Creatinine Clearance: 58 mL/min (A) (based on SCr of 1.58 mg/dL (H)).    Plan:  Pharmacologic VTE prophylaxis modified based on patient weight per [facility]/P&T approved protocol     Patient Weight (kg)      50.9 and below .9 101-150.9 151-174.9 175 or greater   Estimated   CrCl  (ml/min) 30 or greater []   30 mg   SUBQ daily   []   40 mg   SUBQ daily (or 30 mg BID for orthopedic cases) [x]  30 mg SUBQ   BID*  []  40 mg   SUBQ   BID []  60mg SUBQ BID    15-29.9 []  UFH 5000   units SUBQ BID []  30 mg   SUBQ daily [] 30 mg SUBQ   daily []  40 mg SUBQ   daily [] 60 mg SUBQ   Daily*    Less than 15 or dialysis []  UFH 5000   units SUBQ BID [] UFH 5000 units SUBQ TID []  UFH 7500   units   SUBQ TID*   *Do not exceed enoxaparin 40mg daily or UFH 5000 units SUBQ TID in patients with epidurals,   lumbar drains, or external ventricular drains

## 2024-08-25 NOTE — ED NOTES
Iv obtained #22 rt inner wrist. This rn attempted multiple attempts with US and normal iv access procedure.

## 2024-08-25 NOTE — ED PROVIDER NOTES
MG TABS TABLET    Take 1 tablet by mouth daily       ALLERGIES     Doxycycline, Enoxaparin, Fenofibrate, Metformin and related, and Nateglinide    FAMILY HISTORY       Family History   Problem Relation Age of Onset    Stroke Mother     Heart Disease Father     Colon Cancer Neg Hx           SOCIAL HISTORY       Social History     Socioeconomic History    Marital status:      Spouse name: None    Number of children: None    Years of education: None    Highest education level: None   Occupational History     Comment: exposures to diesel fuels   Tobacco Use    Smoking status: Former     Current packs/day: 0.00     Average packs/day: 2.0 packs/day for 35.0 years (70.0 ttl pk-yrs)     Types: Cigarettes     Start date:      Quit date:      Years since quittin.6    Smokeless tobacco: Never   Vaping Use    Vaping status: Never Used   Substance and Sexual Activity    Alcohol use: No    Drug use: No    Sexual activity: Yes     Partners: Female     Social Determinants of Health     Food Insecurity: No Food Insecurity (2024)    Hunger Vital Sign     Worried About Running Out of Food in the Last Year: Never true     Ran Out of Food in the Last Year: Never true   Transportation Needs: No Transportation Needs (2024)    PRAPARE - Transportation     Lack of Transportation (Medical): No     Lack of Transportation (Non-Medical): No   Stress: No Stress Concern Present (2024)    [facility]    [place] [employer] - Occupational Stress Questionnaire     Feeling of Stress : Not at all   Housing Stability: Low Risk  (2024)    Housing Stability Vital Sign     Unable to Pay for Housing in the Last Year: No     Number of Places Lived in the Last Year: 1     Unstable Housing in the Last Year: No       SCREENINGS    Jordan Coma Scale  Eye Opening: Spontaneous  Best Verbal Response: Oriented  Best Motor Response: Obeys commands  Richardson Coma Scale  Plain radiographic images are visualized and preliminarily interpreted by the emergency physician with the below findings:        Interpretation per the Radiologist below, if available at the time ofthis note:    XR CHEST PORTABLE   Final Result   Chronic findings without appreciable acute process.               ED BEDSIDE ULTRASOUND:   Performed by ED Physician - none    LABS:  Labs Reviewed   CBC WITH AUTO DIFFERENTIAL - Abnormal; Notable for the following components:       Result Value    WBC 51.1 (*)     MCHC 31.5 (*)     RDW 16.3 (*)     Eosinophils % 8.0 (*)     Basophils % 1.7 (*)     Neutrophils Absolute 39.3 (*)     Lymphocytes Absolute 4.6 (*)     Monocytes Absolute 3.1 (*)     Eosinophils Absolute 4.1 (*)     All other components within normal limits    Narrative:     CALL  [place]  LOER tel. [phone],  Hematology results called to and read back by naomie hawkins, 08/25/2024 11:23, by  PAULY  Hematology results called to and read back by naomie HAWKINS. 1104 dy, 08/25/2024  11:04, by PAULY   COMPREHENSIVE METABOLIC PANEL - Abnormal; Notable for the following components:    Sodium 133 (*)     Chloride 92 (*)     Glucose 215 (*)     BUN 53 (*)     Creatinine 1.58 (*)     Est, Glom Filt Rate 47.5 (*)     Calcium 10.4 (*)     Total Bilirubin 0.8 (*)     Alkaline Phosphatase 184 (*)     All other components within normal limits   PROTIME-INR - Abnormal; Notable for the following components:    Protime 16.4 (*)     All other components within normal limits   TROPONIN - Abnormal; Notable for the following components:    Troponin, High Sensitivity 31 (*)     All other components within normal limits   TROPONIN - Abnormal; Notable for the following components:    Troponin, High Sensitivity 30 (*)     All other components within normal limits   MAGNESIUM   BRAIN NATRIURETIC PEPTIDE   LACTIC ACID   URINALYSIS WITH REFLEX TO CULTURE       All other labs were within normal range or not returned as of this dictation.    EMERGENCY

## 2024-08-26 VITALS
DIASTOLIC BLOOD PRESSURE: 51 MMHG | SYSTOLIC BLOOD PRESSURE: 129 MMHG | TEMPERATURE: 97.7 F | RESPIRATION RATE: 20 BRPM | BODY MASS INDEX: 36.65 KG/M2 | WEIGHT: 256 LBS | OXYGEN SATURATION: 89 % | HEART RATE: 80 BPM | HEIGHT: 70 IN

## 2024-08-26 LAB
A BAUMANNII DNA BLD POS QL NAA+NON-PROBE: NOT DETECTED
BACTERIA URNS QL MICRO: ABNORMAL /HPF
BILIRUB UR QL STRIP: NEGATIVE
C ALBICANS DNA BLD POS QL NAA+NON-PROBE: NOT DETECTED
C AURIS DNA BLD POS QL NAA+PROBE: NOT DETECTED
C GLABRATA DNA BLD POS QL NAA+NON-PROBE: NOT DETECTED
C KRUSEI DNA BLD POS QL NAA+NON-PROBE: NOT DETECTED
C PARAP DNA BLD POS QL NAA+NON-PROBE: NOT DETECTED
C TROPICLS DNA BLD POS QL NAA+NON-PROBE: NOT DETECTED
CLARITY UR: CLEAR
COLOR UR: YELLOW
CRYPTOCOCCUS NEOFORMANS/GATTII BY PCR: NOT DETECTED
E CLOAC COMP DNA BLD POS NAA+NON-PROBE: NOT DETECTED
E COLI DNA BLD POS QL NAA+NON-PROBE: NOT DETECTED
E FAECALIS DNA BLD POS QL NAA+PROBE: NOT DETECTED
E FAECIUM DNA BLD POS QL NAA+PROBE: NOT DETECTED
ENTEROBACT DNA BLD POS QL NAA+NON-PROBE: NOT DETECTED
ENTEROCOC DNA BLD POS QL NAA+NON-PROBE: NOT DETECTED
EPI CELLS #/AREA URNS HPF: ABNORMAL /HPF
GLUCOSE BLD-MCNC: 196 MG/DL (ref 70–99)
GLUCOSE BLD-MCNC: 202 MG/DL (ref 70–99)
GLUCOSE UR STRIP-MCNC: NEGATIVE MG/DL
GN BLD CULTURE PNL BLD POS NAA+PROBE: NOT DETECTED
GP B STREP DNA BLD POS QL NAA+NON-PROBE: NOT DETECTED
HGB UR QL STRIP: ABNORMAL
K OXYTOCA DNA BLD POS QL NAA+NON-PROBE: NOT DETECTED
K PNEUMON DNA SPEC QL NAA+PROBE: NOT DETECTED
K. AEROGENES DNA SPEC QL NAA+PROBE: NOT DETECTED
KETONES UR STRIP-MCNC: NEGATIVE MG/DL
L MONOCYTOG DNA BLD POS QL NAA+NON-PROBE: NOT DETECTED
LEUKOCYTE ESTERASE UR QL STRIP: NEGATIVE
MECA BLD POS QL NAA+NON-PROBE: NOT DETECTED
N MEN DNA BLD POS QL NAA+NON-PROBE: NOT DETECTED
NITRITE UR QL STRIP: NEGATIVE
P AERUGINOSA DNA BLD POS NAA+NON-PROBE: NOT DETECTED
PATH INTERP BLD-IMP: NORMAL
PERFORMED ON: ABNORMAL
PERFORMED ON: ABNORMAL
PH UR STRIP: 5 [PH] (ref 5–9)
PROT UR STRIP-MCNC: ABNORMAL MG/DL
PROTEUS SP DNA BLD POS QL NAA+NON-PROBE: NOT DETECTED
RBC #/AREA URNS HPF: ABNORMAL /HPF (ref 0–2)
S AUREUS DNA BLD POS QL NAA+NON-PROBE: NOT DETECTED
S AUREUS+CONS DNA BLD POS NAA+NON-PROBE: DETECTED
S EPIDERMIDIS DNA BLD POS QL NAA+PROBE: DETECTED
S LUGDUNENSIS DNA BLD POS QL NAA+PROBE: NOT DETECTED
S MALTOPH DNA BLD POS QL NAA+PROBE: NOT DETECTED
S MARCESCENS DNA BLD POS NAA+NON-PROBE: NOT DETECTED
S PNEUM DNA BLD POS QL NAA+NON-PROBE: NOT DETECTED
S PYO DNA BLD POS QL NAA+NON-PROBE: NOT DETECTED
SALMONELLA DNA BLD POS QL NAA+PROBE: NOT DETECTED
SP GR UR STRIP: 1.02 (ref 1–1.03)
STREPTOCOCCUS DNA BLD POS NAA+NON-PROBE: NOT DETECTED
UROBILINOGEN UR STRIP-ACNC: 0.2 E.U./DL
WBC #/AREA URNS HPF: ABNORMAL /HPF (ref 0–5)

## 2024-08-26 PROCEDURE — G0378 HOSPITAL OBSERVATION PER HR: HCPCS

## 2024-08-26 PROCEDURE — 97166 OT EVAL MOD COMPLEX 45 MIN: CPT

## 2024-08-26 PROCEDURE — 81001 URINALYSIS AUTO W/SCOPE: CPT

## 2024-08-26 PROCEDURE — 6370000000 HC RX 637 (ALT 250 FOR IP): Performed by: INTERNAL MEDICINE

## 2024-08-26 PROCEDURE — 86403 PARTICLE AGGLUT ANTBDY SCRN: CPT

## 2024-08-26 PROCEDURE — 2580000003 HC RX 258: Performed by: INTERNAL MEDICINE

## 2024-08-26 PROCEDURE — 97535 SELF CARE MNGMENT TRAINING: CPT

## 2024-08-26 PROCEDURE — 97116 GAIT TRAINING THERAPY: CPT

## 2024-08-26 PROCEDURE — 97530 THERAPEUTIC ACTIVITIES: CPT

## 2024-08-26 PROCEDURE — 97162 PT EVAL MOD COMPLEX 30 MIN: CPT

## 2024-08-26 PROCEDURE — 2700000000 HC OXYGEN THERAPY PER DAY

## 2024-08-26 PROCEDURE — 94760 N-INVAS EAR/PLS OXIMETRY 1: CPT

## 2024-08-26 RX ORDER — METOPROLOL SUCCINATE 50 MG/1
100 TABLET, EXTENDED RELEASE ORAL DAILY
Status: DISCONTINUED | OUTPATIENT
Start: 2024-08-26 | End: 2024-08-26 | Stop reason: HOSPADM

## 2024-08-26 RX ORDER — METOPROLOL SUCCINATE 100 MG/1
100 TABLET, EXTENDED RELEASE ORAL DAILY
Qty: 30 TABLET | Refills: 3 | Status: SHIPPED | OUTPATIENT
Start: 2024-08-26 | End: 2024-08-31

## 2024-08-26 RX ORDER — AZITHROMYCIN 250 MG/1
500 TABLET, FILM COATED ORAL DAILY
Status: DISCONTINUED | OUTPATIENT
Start: 2024-08-26 | End: 2024-08-26 | Stop reason: HOSPADM

## 2024-08-26 RX ADMIN — PANTOPRAZOLE SODIUM 40 MG: 40 TABLET, DELAYED RELEASE ORAL at 08:28

## 2024-08-26 RX ADMIN — INSULIN GLARGINE 20 UNITS: 100 INJECTION, SOLUTION SUBCUTANEOUS at 08:33

## 2024-08-26 RX ADMIN — METOPROLOL SUCCINATE 100 MG: 50 TABLET, EXTENDED RELEASE ORAL at 08:27

## 2024-08-26 RX ADMIN — BUPROPION HYDROCHLORIDE 150 MG: 150 TABLET, EXTENDED RELEASE ORAL at 08:28

## 2024-08-26 RX ADMIN — GABAPENTIN 300 MG: 300 CAPSULE ORAL at 08:28

## 2024-08-26 RX ADMIN — SODIUM CHLORIDE, PRESERVATIVE FREE 10 ML: 5 INJECTION INTRAVENOUS at 08:34

## 2024-08-26 RX ADMIN — INSULIN LISPRO 6 UNITS: 100 INJECTION, SOLUTION INTRAVENOUS; SUBCUTANEOUS at 12:26

## 2024-08-26 RX ADMIN — AZITHROMYCIN DIHYDRATE 500 MG: 250 TABLET ORAL at 08:27

## 2024-08-26 RX ADMIN — PREDNISONE 10 MG: 10 TABLET ORAL at 08:28

## 2024-08-26 RX ADMIN — INSULIN LISPRO 6 UNITS: 100 INJECTION, SOLUTION INTRAVENOUS; SUBCUTANEOUS at 08:33

## 2024-08-26 RX ADMIN — GABAPENTIN 300 MG: 300 CAPSULE ORAL at 14:53

## 2024-08-26 RX ADMIN — INSULIN LISPRO 2 UNITS: 100 INJECTION, SOLUTION INTRAVENOUS; SUBCUTANEOUS at 08:33

## 2024-08-26 RX ADMIN — LEVOTHYROXINE SODIUM 150 MCG: 0.07 TABLET ORAL at 05:33

## 2024-08-26 RX ADMIN — TAMSULOSIN HYDROCHLORIDE 0.4 MG: 0.4 CAPSULE ORAL at 08:27

## 2024-08-26 NOTE — PROGRESS NOTES
completed with pt below baseline and interested in C, pt noted to have decreased FMC and dexterity in B hands, built up utensils provided which improved self feeding. Pt will need additional OT services to ensure maximum participation in ADLs and functional tasks.  Treatment Diagnosis: Decreased ADL/IADL performance d/t cough, lung CA, fatigue  REQUIRES OT FOLLOW-UP: Yes  Activity Tolerance  Activity Tolerance: Patient limited by fatigue     Plan  Occupational Therapy Plan  Times Per Week: 4-7  Times Per Day: Once a day  Current Treatment Recommendations: Strengthening, Balance training, Safety education & training, Pain management, Self-Care / ADL, Functional mobility training, Patient/Caregiver education & training, Endurance training, Equipment evaluation, education, & procurement, Positioning    Restrictions  Restrictions/Precautions  Restrictions/Precautions: Fall Risk  Required Braces or Orthoses?: No    Subjective  General  Chart Reviewed: Yes, Progress Notes, History and Physical  Patient assessed for rehabilitation services?: Yes  Response to previous treatment: Patient reporting fatigue but able to participate  Family / Caregiver Present: No  Referring Practitioner: Dr. Roland  Diagnosis: Couging, fatigue, SOB  Subjective  Subjective: \"This all started two weeks ago.\"  Reports no pain  Social/Functional History  Social/Functional History  Lives With: Significant other  Type of Home: House  Home Layout: One level  Home Access: Stairs to enter with rails  Entrance Stairs - Number of Steps: 2  Entrance Stairs - Rails: Left  Bathroom Shower/Tub: Tub/Shower unit, Shower chair without back  Bathroom Toilet: Standard  Bathroom Equipment: Shower chair  Bathroom Accessibility: Not accessible  Home Equipment: Oxygen  Has the patient had two or more falls in the past year or any fall with injury in the past year?: No  Receives Help From: Family  ADL Assistance: Independent  Homemaking Assistance:  Independent  Homemaking Responsibilities: Yes  Ambulation Assistance: Independent  Transfer Assistance: Independent  Active : Yes  Mode of Transportation: Truck  Occupation: Retired  Type of Occupation: [employer]  Leisure & Hobbies: Golf, watch TV    Objective  Temp: 97.7 °F (36.5 °C)  Pulse: 81  Heart Rate Source: Monitor  Respirations: 20  SpO2: 96 %  O2 Device: Nasal cannula  BP: (!) 150/71  MAP (Calculated): 97  BP Location: Left upper arm  BP Method: Automatic  Patient Position: Up in chair  Comment: 4L 98% at rest          Observation/Palpation  Posture: Fair  Observation: NC, IV RUE  Safety Devices  Type of Devices: Left in chair;Call light within reach;Chair alarm in place;Gait belt  Bed Mobility Training  Bed Mobility Training: No  Balance  Sitting: Intact  Standing: Impaired;With support (FWW used CGA)  Transfer Training  Transfer Training: Yes  Overall Level of Assistance: Contact-guard assistance (FWW)  Sit to Stand: Contact-guard assistance  Stand to Sit: Contact-guard assistance     AROM: Generally decreased, functional (Decreased B shoulder AROM)  Strength: Generally decreased, functional (3/5 BUE MMT)  Coordination: Generally decreased, functional (Could not oppose thumb>pinky R hand)  Tone: Normal  Sensation: Impaired (Reports numbness in B hands D1-D2)  ADL  Feeding: Setup;Adaptive utensils (Comment)  Feeding Skilled Clinical Factors: Built up utensils provided d/t decreased dexterity, pt reports some improvement. Needs extra time.  Grooming: Minimal assistance  UE Bathing: Minimal assistance  LE Bathing: Moderate assistance  UE Dressing: Minimal assistance  LE Dressing: Moderate assistance  Toileting: Minimal assistance  Functional Mobility: Contact guard assistance  Functional Mobility Skilled Clinical Factors: FWW, unsteady, needs hands on assist              Vision  Vision: Impaired  Vision Exceptions: Wears glasses for reading  Hearing  Hearing: Within functional

## 2024-08-26 NOTE — DISCHARGE INSTR - COC
Continuity of Care Form    Patient Name: Gerardo Esquivel   :  1955  MRN:  [id number]    Admit date:  2024  Discharge date:  24    Code Status Order: Full Code   Advance Directives:   Advance Care Flowsheet Documentation             Admitting Physician:  Britton Roland MD  PCP: Sandra Barrera MD    Discharging Nurse: Selma REYES  Discharging Hospital Unit/Room#: 0222/0222-01  Discharging Unit Phone Number: [phone]    Emergency Contact:   Extended Emergency Contact Information  Primary Emergency Contact: Krys Almeida   [facility]  Home Phone: [phone]  Work Phone: [phone]  Mobile Phone: [phone]  Relation: Other  Secondary Emergency Contact: Live Esquivel   [facility]  Home Phone: [phone]  Work Phone: [phone]  Mobile Phone: [phone]  Relation: Brother/Sister    Past Surgical History:  Past Surgical History:   Procedure Laterality Date    ABDOMEN SURGERY      hodgkin     BRONCHOSCOPY N/A 2024    EBUS WITH BIOPSYS performed by Cassidy Hu MD at [facility] OR    CATARACT REMOVAL Bilateral     CHOLECYSTECTOMY      COLONOSCOPY      COLONOSCOPY N/A 2016    COLONOSCOPY performed by Saundra Blount MD at [facility]    COLONOSCOPY N/A 2021    COLORECTAL CANCER SCREENING, HIGH RISK WITH POLYPECTOMY performed by Saundra Blount MD at [facility]    ENDOSCOPY, COLON, DIAGNOSTIC      HERNIA REPAIR      umbilical    SKIN BIOPSY      SPLENECTOMY, TOTAL      THORACOSCOPY W/ TALC PLEURODESIS Right     x 2    TOOTH EXTRACTION      TOTAL THYROIDECTOMY      CANCER       Immunization History:   Immunization History   Administered Date(s) Administered    COVID-19, MODERNA, ( formula), (age 12y+), IM, 50mcg/0.5mL 2023    COVID-19, PFIZER PURPLE top, DILUTE for use, (age 12 y+), 30mcg/0.3mL 2021, 2021, 2021       Active Problems:  Patient Active Problem List   Diagnosis Code    Pneumonia J18.9    DKA, type 2, not    None    Nutrition Therapy:  Current Nutrition Therapy:   - Oral Diet:  General    Routes of Feeding: Oral  Liquids: Thin Liquids  Daily Fluid Restriction: no  Last Modified Barium Swallow with Video (Video Swallowing Test): not done    Treatments at the Time of Hospital Discharge:   Respiratory Treatments:     Oxygen Therapy:  is on oxygen at 3 L/min per nasal cannula.  Ventilator:    - No ventilator support    Rehab Therapies: Physical Therapy and Occupational Therapy  Weight Bearing Status/Restrictions: No weight bearing restrictions  Other Medical Equipment (for information only, NOT a DME order):  cane  Other Treatments:     Patient's personal belongings (please select all that are sent with patient):  None    RN SIGNATURE:  Electronically signed by Selma Chacon RN on 8/26/24 at 2:35 PM EDT    CASE MANAGEMENT/SOCIAL WORK SECTION    Inpatient Status Date: 8/25/24    Readmission Risk Assessment Score:  Readmission Risk              Risk of Unplanned Readmission:  0           Discharging to Facility/ Agency   Name: [facility]   Address: [facility] [address]   Phone: [phone]  Fax: [phone]      / signature: Electronically signed by TIFFANIE Ramirez on 8/26/24 at 11:39 AM EDT    PHYSICIAN SECTION    Prognosis: Guarded    Condition at Discharge: Stable    Rehab Potential (if transferring to Rehab): Guarded    Recommended Labs or Other Treatments After Discharge: accu check    Physician Certification: I certify the above information and transfer of Gerardo Esquivel  is necessary for the continuing treatment of the diagnosis listed and that he requires Home Care for greater 30 days.     Update Admission H&P: No change in H&P    PHYSICIAN SIGNATURE:  Electronically signed by Britton Roland MD on 8/26/24 at 7:53 AM EDT

## 2024-08-26 NOTE — PROGRESS NOTES
[facility] Initial Discharge Assessment    Met with Patient to discuss discharge plan.        PCP: Sandra Barrera MD                                  Date of Last Visit: \" 4 weeks ago\"    If no PCP, list provided? N/A    Discharge Planning    Living Arrangements: independently at home    Who do you live with? Significant other Krys     Who helps you with your care:  self    If lives at home:     Do you have any barriers navigating in your home? no    Patient can perform ADL?  Yes    Current Services (outpatient and in home) :  None    Dialysis: No    Is transportation available to get to your appointments? Yes    DME Equipment:  yes - oxygen 3 Liters continuous, nebulizer, shower chair, and cane.  Patient identifies no further DME needs     Respiratory equipment: Continuous Oxygen  3 Liters      Respiratory provider:  yes - [facility]     Pharmacy:  yes - [facility] in [place]     Able to afford cost of medication: Yes    Does patient feel safe at home? Yes:  Patient identifies significant-other Krys as supportive.  Patient reports feeling safe at home and reports no current thoughts to harm self or others.  Patient reports no current suicidal or homicidal ideation     Does patient identify any home going needs? Yes, Patient was evaluated by PT and PT recommending home with prn assist, Home with HHC upon DC.  SS reviewed PT DC recommendation with patient and provided freedom of choice.  Patient reports interest in receiving skilled HHC    Patient agreeable to [facility]?      Yes, [facility].  However patient reports that if [employer] [facility] is unable to follow due to patient residing in [place] then patient reports interest in a HHC agency insurance will cover that services [facility]     Patient agreeable to SNF/Rehab?     N/A    Other discharge needs identified?      Patient may benefit from Palliative services for symptom management.  SS provided education on Palliative services.  Patient encouraged to  further discuss Pall Care at oncology appointment this week     Was Freedom of Choice Provided?      Yes    Initial Discharge Plan? (Note: please see concurrent daily documentation for any updates after initial note).      Chart reviewed.  Patient presented to [facility] complaining of shortness of breath and pain.  Patient was admitted to medical floor under observation status.    Patient's care discussed in daily quality rounds.  Patient is reported to have Lung cancer and following up with oncology.  Patient is reported to have an appointment with oncologist this Thursday 8/29.  Patient may benefit from Palliative services for symptom control.  Patient reported to be demonstrating an increase in weakness.  Patient was evaluated by PT/OT and therapies recommending HHC upon DC.  This  met with patient to discuss DC planning and help at home needs.  Upon visit patient is alert and oriented to person, place, and situation.  However patient presents as lethargic and falls asleep throughout visit.  Patient reports that he did not sleep well last night.  Patient reports residing at home with significant other Krys.  Patient reports caring for own ALDS, being an active  and has home O2.  Patient reports feeling safe and well supported at home by significant other Krys.  Patient identifies no DME needs.  SS reviewed PT recommendations for DC and provided freedom of choice.  Patient agreeable with skilled HHC and reports interest in [facility].   SS provided education on Palliative services.  Patient encouraged to further discuss Pall Care at oncology appointment this week.  Patient reports plan to keep oncology appointment and that significant other Krys is accompanying patient to oncology appointment.  Patient reports that Krys will also transport patient back home from [facility] hospital today.  Patient identifies no further DC or help at home needs.    This  contacted [facility] liaison Nanci

## 2024-08-26 NOTE — PROGRESS NOTES
Pt A&Ox3 sitting on side of bed. Assessment and vitals are as charted. Medications given per orders. Pt given ice water and diet pepsi per request. Pt assisted to chair per request. Pt states no further needs at this time. Call and table are within reach.

## 2024-08-26 NOTE — PROGRESS NOTES
Facility/Department: [facility] SURG UNIT  Physical Therapy Initial Assessment    Name: Gerardo Esquivel  : 1955  MRN: [id number]  Date of Service: 2024    Discharge Recommendations:  Continue to assess pending progress, Home with assist PRN, Home with Home health PT          Patient Diagnosis(es): The primary encounter diagnosis was Pain management. Diagnoses of Lung mass, Dehydration, Renal insufficiency, and S/P splenectomy were also pertinent to this visit.  Past Medical History:  has a past medical history of CAD (coronary artery disease), Chronic cough, CKD (chronic kidney disease), stage III (HCC), COPD (chronic obstructive pulmonary disease) (HCC), Depression, H/O pneumothorax, Hodgkin disease (HCC), Hyperlipidemia, Hypertension, Hypothyroid, Lung mass, MRSA infection, Pulmonary embolism (HCC), Sleep apnea, Thyroid cancer (HCC), and Type II or unspecified type diabetes mellitus without mention of complication, not stated as uncontrolled.  Past Surgical History:  has a past surgical history that includes Total Thyroidectomy; Cholecystectomy; Splenectomy, total; Tooth Extraction; Colonoscopy; Endoscopy, colon, diagnostic; hernia repair; Abdomen surgery; Colonoscopy (N/A, 2016); Colonoscopy (N/A, 2021); Thoracoscopy w/ talc pleurodesis (Right); Cataract removal (Bilateral); skin biopsy; and bronchoscopy (N/A, 2024).    Assessment  Body Structures, Functions, Activity Limitations Requiring Skilled Therapeutic Intervention: Decreased functional mobility ;Decreased endurance;Increased pain;Decreased balance;Decreased fine motor control  Assessment: Pt is a 68 yo male who was recently dx with lung cancer and is to see his oncologist on 24 but came to the ER due to coughing up blood and not feeling well when at home. PT completed an evaluation and pt was struggling with using his fork for his breakfast of eggs and pancakes. PT assisted pt with cutting his food and declined any

## 2024-08-26 NOTE — PROGRESS NOTES
Patient here for increased weakness and pain. Patient states that weakness has been increasing for the past couple weeks. Patient denies pain at this time. PT/OT eval and recommended home with [facility].       Patient and s/o agreaable to discharge today. Discharge instructions reviewed with patient and s/o at bedside. Both verbalized understanding.  They report they have good support system and will have friends/family assistance at home when s/o is at work.  [facility] to follow. F/u oncology on Thursday, 8/29. Patient left unit via wheelchair, accompanied by s/o. No s/s distress noted.

## 2024-08-26 NOTE — H&P
Hospital Medicine History & Physical      PCP: Sandra Barrera MD    Date of Admission: 8/25/2024    Date of Service: 8/26/24      Chief Complaint:  weakness/dyspnea       History Of Present Illness:  69 y.o. male who presented to [facility] with above complains. Patient has resent diagnosed lung CA-non small cell carcinoma, on home O2- 3 L, following by pulmonary service, having appointment with oncology in 4 days, came to ER yesterday due to not feeling well, back pain, dyspnea, hemoptysis. Has paroxysmal nocturnal dyspnea- was sleeping in recliner x mts. Denied fever/CP, dizziness. After initial stabilization in ER he was admitted over night      Past Medical History:          Diagnosis Date    CAD (coronary artery disease)     Chronic cough     CKD (chronic kidney disease), stage III (HCC)     COPD (chronic obstructive pulmonary disease) (HCC)     Depression     H/O pneumothorax     x 2    Hodgkin disease (HCC)     Hyperlipidemia     Hypertension     Hypothyroid     Lung mass     MRSA infection 09/2014    Left Groin     Pulmonary embolism (HCC)     Sleep apnea     Thyroid cancer (HCC)     Type II or unspecified type diabetes mellitus without mention of complication, not stated as uncontrolled        Past Surgical History:          Procedure Laterality Date    ABDOMEN SURGERY      hodgkin     BRONCHOSCOPY N/A 7/26/2024    EBUS WITH BIOPSYS performed by Cassidy Hu MD at [facility] OR    CATARACT REMOVAL Bilateral     CHOLECYSTECTOMY      COLONOSCOPY      COLONOSCOPY N/A 11/22/2016    COLONOSCOPY performed by Saundra Blount MD at [facility]    COLONOSCOPY N/A 11/04/2021    COLORECTAL CANCER SCREENING, HIGH RISK WITH POLYPECTOMY performed by Saundra Blount MD at [facility]    ENDOSCOPY, COLON, DIAGNOSTIC      HERNIA REPAIR      umbilical    SKIN BIOPSY      SPLENECTOMY, TOTAL      THORACOSCOPY W/ TALC PLEURODESIS Right     x 2    TOOTH EXTRACTION      TOTAL THYROIDECTOMY      CANCER

## 2024-08-27 ENCOUNTER — APPOINTMENT (OUTPATIENT)
Dept: GENERAL RADIOLOGY | Age: 69
End: 2024-08-27
Payer: MEDICARE

## 2024-08-27 ENCOUNTER — HOSPITAL ENCOUNTER (INPATIENT)
Age: 69
LOS: 2 days | Discharge: HOSPICE | End: 2024-08-29
Attending: INTERNAL MEDICINE | Admitting: INTERNAL MEDICINE
Payer: MEDICARE

## 2024-08-27 ENCOUNTER — APPOINTMENT (OUTPATIENT)
Dept: CT IMAGING | Age: 69
End: 2024-08-27
Payer: MEDICARE

## 2024-08-27 ENCOUNTER — APPOINTMENT (OUTPATIENT)
Dept: INTERVENTIONAL RADIOLOGY/VASCULAR | Age: 69
End: 2024-08-27
Payer: MEDICARE

## 2024-08-27 DIAGNOSIS — D72.829 LEUKOCYTOSIS, UNSPECIFIED TYPE: ICD-10-CM

## 2024-08-27 DIAGNOSIS — R53.1 GENERAL WEAKNESS: Primary | ICD-10-CM

## 2024-08-27 DIAGNOSIS — Z85.118 HX OF CANCER OF LUNG: ICD-10-CM
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
## 2024-08-27 DIAGNOSIS — J18.9 PNEUMONIA OF LEFT LOWER LOBE DUE TO INFECTIOUS ORGANISM: ICD-10-CM

## 2024-08-27 DIAGNOSIS — N17.9 AKI (ACUTE KIDNEY INJURY) (HCC): ICD-10-CM

## 2024-08-27 LAB
ALBUMIN SERPL-MCNC: 3.6 G/DL (ref 3.5–4.6)
ALP SERPL-CCNC: 160 U/L (ref 35–104)
ALT SERPL-CCNC: 24 U/L (ref 0–41)
ANION GAP SERPL CALCULATED.3IONS-SCNC: 14 MEQ/L (ref 9–15)
APTT PPP: 25.5 SEC (ref 24.4–36.8)
AST SERPL-CCNC: 29 U/L (ref 0–40)
BACTERIA BLD CULT ORG #2: ABNORMAL
BASOPHILS # BLD: 0.5 K/UL (ref 0–0.2)
BASOPHILS NFR BLD: 1 %
BILIRUB SERPL-MCNC: 0.5 MG/DL (ref 0.2–0.7)
BILIRUB UR QL STRIP: NEGATIVE
BUN SERPL-MCNC: 76 MG/DL (ref 8–23)
CALCIUM SERPL-MCNC: 9.7 MG/DL (ref 8.5–9.9)
CHLORIDE SERPL-SCNC: 95 MEQ/L (ref 95–107)
CLARITY UR: CLEAR
CO2 SERPL-SCNC: 23 MEQ/L (ref 20–31)
COLOR UR: YELLOW
CREAT SERPL-MCNC: 1.89 MG/DL (ref 0.7–1.2)
EKG ATRIAL RATE: 83 BPM
EKG P AXIS: 44 DEGREES
EKG P-R INTERVAL: 216 MS
EKG Q-T INTERVAL: 388 MS
EKG QRS DURATION: 106 MS
EKG QTC CALCULATION (BAZETT): 455 MS
EKG R AXIS: -4 DEGREES
EKG T AXIS: 32 DEGREES
EKG VENTRICULAR RATE: 83 BPM
EOSINOPHIL # BLD: 4.1 K/UL (ref 0–0.7)
EOSINOPHIL NFR BLD: 8 %
ERYTHROCYTE [DISTWIDTH] IN BLOOD BY AUTOMATED COUNT: 16.1 % (ref 11.5–14.5)
GLOBULIN SER CALC-MCNC: 1.9 G/DL (ref 2.3–3.5)
GLUCOSE BLD-MCNC: 123 MG/DL (ref 70–99)
GLUCOSE SERPL-MCNC: 134 MG/DL (ref 70–99)
GLUCOSE UR STRIP-MCNC: NEGATIVE MG/DL
HCT VFR BLD AUTO: 40.3 % (ref 42–52)
HGB BLD-MCNC: 12.4 G/DL (ref 14–18)
HGB UR QL STRIP: NEGATIVE
INR PPP: 1.2
KETONES UR STRIP-MCNC: NEGATIVE MG/DL
LACTATE BLDV-SCNC: 1.4 MMOL/L (ref 0.5–2.2)
LEUKOCYTE ESTERASE UR QL STRIP: NEGATIVE
LYMPHOCYTES # BLD: 2.6 K/UL (ref 1–4.8)
LYMPHOCYTES NFR BLD: 3 %
MAGNESIUM SERPL-MCNC: 2.3 MG/DL (ref 1.7–2.4)
MCH RBC QN AUTO: 25.6 PG (ref 27–31.3)
MCHC RBC AUTO-ENTMCNC: 30.8 % (ref 33–37)
MCV RBC AUTO: 83.3 FL (ref 79–92.2)
MONOCYTES # BLD: 2.1 K/UL (ref 0.2–0.8)
MONOCYTES NFR BLD: 3.9 %
NEUTROPHILS # BLD: 42.9 K/UL (ref 1.4–6.5)
NEUTS SEG NFR BLD: 83 %
NITRITE UR QL STRIP: NEGATIVE
PERFORMED ON: ABNORMAL
PH UR STRIP: 5 [PH] (ref 5–9)
PLATELET # BLD AUTO: 150 K/UL (ref 130–400)
PLATELET BLD QL SMEAR: ADEQUATE
POTASSIUM SERPL-SCNC: 5.6 MEQ/L (ref 3.4–4.9)
PROCALCITONIN SERPL IA-MCNC: 0.32 NG/ML (ref 0–0.15)
PROT SERPL-MCNC: 5.5 G/DL (ref 6.3–8)
PROT UR STRIP-MCNC: NEGATIVE MG/DL
PROTHROMBIN TIME: 15 SEC (ref 12.3–14.9)
RBC # BLD AUTO: 4.84 M/UL (ref 4.7–6.1)
SLIDE REVIEW: ABNORMAL
SODIUM SERPL-SCNC: 132 MEQ/L (ref 135–144)
SP GR UR STRIP: 1.02 (ref 1–1.03)
TROPONIN, HIGH SENSITIVITY: 28 NG/L (ref 0–19)
TROPONIN, HIGH SENSITIVITY: 29 NG/L (ref 0–19)
URINE REFLEX TO CULTURE: NORMAL
UROBILINOGEN UR STRIP-ACNC: 0.2 E.U./DL
VARIANT LYMPHS NFR BLD: 2 %
WBC # BLD AUTO: 51.7 K/UL (ref 4.8–10.8)

## 2024-08-27 PROCEDURE — 2500000003 HC RX 250 WO HCPCS: Performed by: PHYSICIAN ASSISTANT

## 2024-08-27 PROCEDURE — 96375 TX/PRO/DX INJ NEW DRUG ADDON: CPT

## 2024-08-27 PROCEDURE — 2580000003 HC RX 258: Performed by: PHYSICIAN ASSISTANT

## 2024-08-27 PROCEDURE — 6370000000 HC RX 637 (ALT 250 FOR IP): Performed by: PHYSICIAN ASSISTANT

## 2024-08-27 PROCEDURE — 6360000002 HC RX W HCPCS: Performed by: PHYSICIAN ASSISTANT

## 2024-08-27 PROCEDURE — 81003 URINALYSIS AUTO W/O SCOPE: CPT

## 2024-08-27 PROCEDURE — 36573 INSJ PICC RS&I 5 YR+: CPT

## 2024-08-27 PROCEDURE — 83735 ASSAY OF MAGNESIUM: CPT

## 2024-08-27 PROCEDURE — 85730 THROMBOPLASTIN TIME PARTIAL: CPT

## 2024-08-27 PROCEDURE — 84484 ASSAY OF TROPONIN QUANT: CPT

## 2024-08-27 PROCEDURE — 2580000003 HC RX 258: Performed by: INTERNAL MEDICINE

## 2024-08-27 PROCEDURE — 71250 CT THORAX DX C-: CPT

## 2024-08-27 PROCEDURE — 87040 BLOOD CULTURE FOR BACTERIA: CPT

## 2024-08-27 PROCEDURE — 85025 COMPLETE CBC W/AUTO DIFF WBC: CPT

## 2024-08-27 PROCEDURE — 96365 THER/PROPH/DIAG IV INF INIT: CPT

## 2024-08-27 PROCEDURE — 93005 ELECTROCARDIOGRAM TRACING: CPT | Performed by: INTERNAL MEDICINE

## 2024-08-27 PROCEDURE — 85610 PROTHROMBIN TIME: CPT

## 2024-08-27 PROCEDURE — 84145 PROCALCITONIN (PCT): CPT

## 2024-08-27 PROCEDURE — 87641 MR-STAPH DNA AMP PROBE: CPT

## 2024-08-27 PROCEDURE — 6360000002 HC RX W HCPCS: Performed by: INTERNAL MEDICINE

## 2024-08-27 PROCEDURE — 02HV33Z INSERTION OF INFUSION DEVICE INTO SUPERIOR VENA CAVA, PERCUTANEOUS APPROACH: ICD-10-PCS | Performed by: INTERNAL MEDICINE

## 2024-08-27 PROCEDURE — 93010 ELECTROCARDIOGRAM REPORT: CPT | Performed by: INTERNAL MEDICINE

## 2024-08-27 PROCEDURE — 83036 HEMOGLOBIN GLYCOSYLATED A1C: CPT

## 2024-08-27 PROCEDURE — 36573 INSJ PICC RS&I 5 YR+: CPT | Performed by: RADIOLOGY

## 2024-08-27 PROCEDURE — 71045 X-RAY EXAM CHEST 1 VIEW: CPT

## 2024-08-27 PROCEDURE — 99285 EMERGENCY DEPT VISIT HI MDM: CPT

## 2024-08-27 PROCEDURE — 6370000000 HC RX 637 (ALT 250 FOR IP): Performed by: INTERNAL MEDICINE

## 2024-08-27 PROCEDURE — 1210000000 HC MED SURG R&B

## 2024-08-27 PROCEDURE — 80053 COMPREHEN METABOLIC PANEL: CPT

## 2024-08-27 PROCEDURE — 83605 ASSAY OF LACTIC ACID: CPT

## 2024-08-27 PROCEDURE — C1751 CATH, INF, PER/CENT/MIDLINE: HCPCS

## 2024-08-27 RX ORDER — METOPROLOL SUCCINATE 100 MG/1
100 TABLET, EXTENDED RELEASE ORAL DAILY
Status: DISCONTINUED | OUTPATIENT
Start: 2024-08-28 | End: 2024-08-29 | Stop reason: HOSPADM

## 2024-08-27 RX ORDER — SODIUM CHLORIDE 0.9 % (FLUSH) 0.9 %
5-40 SYRINGE (ML) INJECTION PRN
Status: DISCONTINUED | OUTPATIENT
Start: 2024-08-27 | End: 2024-08-29 | Stop reason: HOSPADM

## 2024-08-27 RX ORDER — GLUCAGON 1 MG/ML
1 KIT INJECTION PRN
Status: DISCONTINUED | OUTPATIENT
Start: 2024-08-27 | End: 2024-08-29 | Stop reason: HOSPADM

## 2024-08-27 RX ORDER — SODIUM CHLORIDE 9 MG/ML
INJECTION, SOLUTION INTRAVENOUS PRN
Status: DISCONTINUED | OUTPATIENT
Start: 2024-08-27 | End: 2024-08-29 | Stop reason: HOSPADM

## 2024-08-27 RX ORDER — INSULIN GLARGINE 100 [IU]/ML
0.15 INJECTION, SOLUTION SUBCUTANEOUS NIGHTLY
Status: DISCONTINUED | OUTPATIENT
Start: 2024-08-27 | End: 2024-08-29 | Stop reason: HOSPADM

## 2024-08-27 RX ORDER — GUAIFENESIN/DEXTROMETHORPHAN 100-10MG/5
5 SYRUP ORAL EVERY 4 HOURS PRN
Status: DISCONTINUED | OUTPATIENT
Start: 2024-08-27 | End: 2024-08-29 | Stop reason: HOSPADM

## 2024-08-27 RX ORDER — ALBUTEROL SULFATE 0.83 MG/ML
2.5 SOLUTION RESPIRATORY (INHALATION) EVERY 4 HOURS PRN
Status: DISCONTINUED | OUTPATIENT
Start: 2024-08-27 | End: 2024-08-29 | Stop reason: HOSPADM

## 2024-08-27 RX ORDER — OXYCODONE AND ACETAMINOPHEN 5; 325 MG/1; MG/1
1 TABLET ORAL ONCE
Status: COMPLETED | OUTPATIENT
Start: 2024-08-27 | End: 2024-08-27

## 2024-08-27 RX ORDER — FLUTICASONE FUROATE AND VILANTEROL 100; 25 UG/1; UG/1
1 POWDER RESPIRATORY (INHALATION) DAILY
COMMUNITY

## 2024-08-27 RX ORDER — SODIUM CHLORIDE 0.9 % (FLUSH) 0.9 %
5-40 SYRINGE (ML) INJECTION EVERY 12 HOURS SCHEDULED
Status: DISCONTINUED | OUTPATIENT
Start: 2024-08-27 | End: 2024-08-29 | Stop reason: HOSPADM

## 2024-08-27 RX ORDER — PREDNISONE 10 MG/1
10 TABLET ORAL DAILY
Status: DISCONTINUED | OUTPATIENT
Start: 2024-08-28 | End: 2024-08-29 | Stop reason: HOSPADM

## 2024-08-27 RX ORDER — 0.9 % SODIUM CHLORIDE 0.9 %
1000 INTRAVENOUS SOLUTION INTRAVENOUS ONCE
Status: COMPLETED | OUTPATIENT
Start: 2024-08-27 | End: 2024-08-27

## 2024-08-27 RX ORDER — ONDANSETRON 4 MG/1
4 TABLET, ORALLY DISINTEGRATING ORAL EVERY 8 HOURS PRN
Status: DISCONTINUED | OUTPATIENT
Start: 2024-08-27 | End: 2024-08-29 | Stop reason: HOSPADM

## 2024-08-27 RX ORDER — ACETAMINOPHEN 325 MG/1
650 TABLET ORAL EVERY 6 HOURS PRN
Status: DISCONTINUED | OUTPATIENT
Start: 2024-08-27 | End: 2024-08-29 | Stop reason: HOSPADM

## 2024-08-27 RX ORDER — INSULIN LISPRO 100 [IU]/ML
0-4 INJECTION, SOLUTION INTRAVENOUS; SUBCUTANEOUS NIGHTLY
Status: DISCONTINUED | OUTPATIENT
Start: 2024-08-27 | End: 2024-08-29 | Stop reason: HOSPADM

## 2024-08-27 RX ORDER — LEVOFLOXACIN 5 MG/ML
750 INJECTION, SOLUTION INTRAVENOUS ONCE
Status: COMPLETED | OUTPATIENT
Start: 2024-08-27 | End: 2024-08-27

## 2024-08-27 RX ORDER — ALBUTEROL SULFATE 0.83 MG/ML
2.5 SOLUTION RESPIRATORY (INHALATION)
Status: DISCONTINUED | OUTPATIENT
Start: 2024-08-28 | End: 2024-08-27

## 2024-08-27 RX ORDER — POTASSIUM CHLORIDE 1500 MG/1
40 TABLET, EXTENDED RELEASE ORAL PRN
Status: DISCONTINUED | OUTPATIENT
Start: 2024-08-27 | End: 2024-08-29 | Stop reason: HOSPADM

## 2024-08-27 RX ORDER — LEVOTHYROXINE SODIUM 150 UG/1
150 TABLET ORAL DAILY
Status: DISCONTINUED | OUTPATIENT
Start: 2024-08-28 | End: 2024-08-29 | Stop reason: HOSPADM

## 2024-08-27 RX ORDER — PANTOPRAZOLE SODIUM 40 MG/1
40 TABLET, DELAYED RELEASE ORAL DAILY
Status: DISCONTINUED | OUTPATIENT
Start: 2024-08-28 | End: 2024-08-29 | Stop reason: HOSPADM

## 2024-08-27 RX ORDER — ONDANSETRON 2 MG/ML
4 INJECTION INTRAMUSCULAR; INTRAVENOUS EVERY 6 HOURS PRN
Status: DISCONTINUED | OUTPATIENT
Start: 2024-08-27 | End: 2024-08-29 | Stop reason: HOSPADM

## 2024-08-27 RX ORDER — GUAIFENESIN 600 MG/1
600 TABLET, EXTENDED RELEASE ORAL 2 TIMES DAILY
Status: DISCONTINUED | OUTPATIENT
Start: 2024-08-27 | End: 2024-08-29 | Stop reason: HOSPADM

## 2024-08-27 RX ORDER — TAMSULOSIN HYDROCHLORIDE 0.4 MG/1
0.4 CAPSULE ORAL DAILY
Status: DISCONTINUED | OUTPATIENT
Start: 2024-08-28 | End: 2024-08-28

## 2024-08-27 RX ORDER — LIDOCAINE HYDROCHLORIDE 20 MG/ML
5 INJECTION, SOLUTION INFILTRATION; PERINEURAL ONCE
Status: COMPLETED | OUTPATIENT
Start: 2024-08-27 | End: 2024-08-27

## 2024-08-27 RX ORDER — SODIUM CHLORIDE 9 MG/ML
INJECTION, SOLUTION INTRAVENOUS CONTINUOUS
Status: DISPENSED | OUTPATIENT
Start: 2024-08-27 | End: 2024-08-28

## 2024-08-27 RX ORDER — ACETAMINOPHEN 650 MG/1
650 SUPPOSITORY RECTAL EVERY 6 HOURS PRN
Status: DISCONTINUED | OUTPATIENT
Start: 2024-08-27 | End: 2024-08-29 | Stop reason: HOSPADM

## 2024-08-27 RX ORDER — TRAMADOL HYDROCHLORIDE 50 MG/1
50 TABLET ORAL EVERY 6 HOURS PRN
Status: DISCONTINUED | OUTPATIENT
Start: 2024-08-27 | End: 2024-08-29 | Stop reason: HOSPADM

## 2024-08-27 RX ORDER — DEXTROSE MONOHYDRATE 100 MG/ML
INJECTION, SOLUTION INTRAVENOUS CONTINUOUS PRN
Status: DISCONTINUED | OUTPATIENT
Start: 2024-08-27 | End: 2024-08-29 | Stop reason: HOSPADM

## 2024-08-27 RX ORDER — ENOXAPARIN SODIUM 100 MG/ML
30 INJECTION SUBCUTANEOUS 2 TIMES DAILY
Status: DISCONTINUED | OUTPATIENT
Start: 2024-08-27 | End: 2024-08-29 | Stop reason: HOSPADM

## 2024-08-27 RX ORDER — MAGNESIUM SULFATE IN WATER 40 MG/ML
2000 INJECTION, SOLUTION INTRAVENOUS PRN
Status: DISCONTINUED | OUTPATIENT
Start: 2024-08-27 | End: 2024-08-29 | Stop reason: HOSPADM

## 2024-08-27 RX ORDER — INSULIN LISPRO 100 [IU]/ML
0-4 INJECTION, SOLUTION INTRAVENOUS; SUBCUTANEOUS
Status: DISCONTINUED | OUTPATIENT
Start: 2024-08-28 | End: 2024-08-29 | Stop reason: HOSPADM

## 2024-08-27 RX ORDER — POTASSIUM CHLORIDE 7.45 MG/ML
10 INJECTION INTRAVENOUS PRN
Status: DISCONTINUED | OUTPATIENT
Start: 2024-08-27 | End: 2024-08-29 | Stop reason: HOSPADM

## 2024-08-27 RX ADMIN — INSULIN GLARGINE 17 UNITS: 100 INJECTION, SOLUTION SUBCUTANEOUS at 22:57

## 2024-08-27 RX ADMIN — OXYCODONE HYDROCHLORIDE AND ACETAMINOPHEN 1 TABLET: 5; 325 TABLET ORAL at 19:39

## 2024-08-27 RX ADMIN — LEVOFLOXACIN 750 MG: 750 INJECTION, SOLUTION INTRAVENOUS at 17:12

## 2024-08-27 RX ADMIN — LIDOCAINE HYDROCHLORIDE 5 ML: 20 INJECTION, SOLUTION INFILTRATION; PERINEURAL at 15:45

## 2024-08-27 RX ADMIN — CEFTRIAXONE SODIUM 1000 MG: 1 INJECTION, POWDER, FOR SOLUTION INTRAMUSCULAR; INTRAVENOUS at 17:06

## 2024-08-27 RX ADMIN — GUAIFENESIN 600 MG: 600 TABLET ORAL at 22:57

## 2024-08-27 RX ADMIN — SODIUM CHLORIDE 1000 ML: 9 INJECTION, SOLUTION INTRAVENOUS at 17:55

## 2024-08-27 RX ADMIN — SODIUM ZIRCONIUM CYCLOSILICATE 10 G: 10 POWDER, FOR SUSPENSION ORAL at 22:00

## 2024-08-27 RX ADMIN — SODIUM CHLORIDE: 9 INJECTION, SOLUTION INTRAVENOUS at 23:02

## 2024-08-27 RX ADMIN — ENOXAPARIN SODIUM 30 MG: 100 INJECTION SUBCUTANEOUS at 22:57

## 2024-08-27 RX ADMIN — SODIUM CHLORIDE, PRESERVATIVE FREE 10 ML: 5 INJECTION INTRAVENOUS at 17:12

## 2024-08-27 RX ADMIN — OXYCODONE HYDROCHLORIDE AND ACETAMINOPHEN 1 TABLET: 5; 325 TABLET ORAL at 13:58

## 2024-08-27 ASSESSMENT — PAIN SCALES - GENERAL
PAINLEVEL_OUTOF10: 2
PAINLEVEL_OUTOF10: 0

## 2024-08-27 ASSESSMENT — LIFESTYLE VARIABLES
HOW OFTEN DO YOU HAVE A DRINK CONTAINING ALCOHOL: NEVER
HOW MANY STANDARD DRINKS CONTAINING ALCOHOL DO YOU HAVE ON A TYPICAL DAY: PATIENT DOES NOT DRINK

## 2024-08-27 NOTE — ED NOTES
Pharmacy Note    Dispense history and chart reviewed for medication history, patient NOT interviewed.  OTC and supplements left on list.   Med Rec left in progress until formal interview can be obtained.    Added  Breo    Clinical Pharmacy Admission Med Reconciliation/ Review Tracking    Total # of Interventions: 1   Missing medications 1  Interventions requiring provider assessment:   NA    Time Spent (min): 30    Jory Lindsay, PharmD 8/27/2024 6:07 PM  Clinical Pharmacy Specialist, Emergency Medicine

## 2024-08-27 NOTE — ED TRIAGE NOTES
Patient brought by private car with family following increased sob and difficulty ambulating States weakness and fatigue. Reports being d/c from [facility] yesterday and Dr. Barrera said to come to er for admission. HX lung ca.  Reports falling today due to weakness, no LOC, no thinners, c/o R shoulder pain.

## 2024-08-27 NOTE — H&P
Hospital Medicine  History and Physical    Patient:  Gerardo Esquivel  MRN: [id number]    CHIEF COMPLAINT:    Chief Complaint   Patient presents with    Fall    Extremity Weakness    Fatigue     History Obtained From:  Patient, EMR  Primary Care Physician: Sandra Barrera MD    HISTORY OF PRESENT ILLNESS:   The patient is a 69 y.o. male with PMH of CAD, CKD, COPD, HTN, HLD, hypothyroidism, DMII who presents with shortness of breath.    The patient has recently been diagnosed with lung cancer.  He has progressively felt weaker, more short of breath with thicker expectoration and worsening hemoptysis.  Denies fevers, chills, sweats.  He has not been eating or drinking well with a loss of appetite.  Denies abdominal pain or tenderness.  Had diarrhea over the weekend but has since resolved.    Patient denies chest pain, shortness of breath, fevers, chills, sweats, diarrhea or burning micturition.      Past Medical History:      Diagnosis Date    CAD (coronary artery disease)     Chronic cough     CKD (chronic kidney disease), stage III (HCC)     COPD (chronic obstructive pulmonary disease) (HCC)     Depression     H/O pneumothorax     x 2    Hodgkin disease (HCC)     Hyperlipidemia     Hypertension     Hypothyroid     Lung mass     MRSA infection 09/2014    Left Groin     Pulmonary embolism (HCC)     Sleep apnea     Thyroid cancer (HCC)     Type II or unspecified type diabetes mellitus without mention of complication, not stated as uncontrolled        Past Surgical History:      Procedure Laterality Date    ABDOMEN SURGERY      hodgkin     BRONCHOSCOPY N/A 7/26/2024    EBUS WITH BIOPSYS performed by Cassidy Hu MD at [facility] OR    CATARACT REMOVAL Bilateral     CHOLECYSTECTOMY      COLONOSCOPY      COLONOSCOPY N/A 11/22/2016    COLONOSCOPY performed by Saundra Blount MD at [facility]    COLONOSCOPY N/A 11/04/2021    COLORECTAL CANCER SCREENING, HIGH RISK WITH POLYPECTOMY performed by Saundra Blount MD at [facility]  13.8 12.4*    150     Recent Labs     08/25/24  1054 08/27/24  1621   * 132*   K 4.8 5.6*   CL 92* 95   CO2 28 23   BUN 53* 76*   CREATININE 1.58* 1.89*   GLUCOSE 215* 134*   AST 28 29   ALT 25 24   BILITOT 0.8* 0.5   ALKPHOS 184* 160*     Troponin T: No results for input(s): \"TROPONINI\" in the last 72 hours.    ABGs: No results found for: \"PHART\", \"PO2ART\", \"QKP1DIU\"  INR:   Recent Labs     08/25/24  1314 08/27/24  1621   INR 1.3 1.2     URINALYSIS:  Recent Labs     08/26/24  0707 08/27/24  1650   COLORU Yellow Yellow   PHUR 5.0 5.0   WBCUA 3-5  --    RBCUA 3-5*  --    BACTERIA RARE*  --    CLARITYU Clear Clear   LEUKOCYTESUR Negative Negative   UROBILINOGEN 0.2 0.2   BILIRUBINUR Negative Negative   BLOODU TRACE* Negative   GLUCOSEU Negative Negative     -----------------------------------------------------------------   XR CHEST PORTABLE    Result Date: 8/27/2024  EXAMINATION: ONE XRAY VIEW OF THE CHEST 8/27/2024 11:41 am COMPARISON: None. HISTORY: ORDERING SYSTEM PROVIDED HISTORY: weakness, fall TECHNOLOGIST PROVIDED HISTORY: Reason for exam:->weakness, fall What reading provider will be dictating this exam?->CRC FINDINGS: The cardiac silhouette is within normal limits. Patchy left perihilar and left lower lobe airspace disease is noted.  There is a small left pleural effusion. The right lung is clear.  There is no pneumothorax.     1. Patchy left perihilar and left lower lobe airspace disease 2. Small left pleural effusion. 3. The right lung is clear.     Assessment and Plan   Sepsis secondary to suspected post obstructive PNA:  Leucocytosis with neutrophils jumping to 85% concerning for infective process; given is history concerning for possible post obstructive PNA; pulm consult; IV Abx; has a history of MRSA  Lung cancer with bone mets:  recent diagnosis; establish with oncology (was seen on 7/19/24); pulm consultation; pall care consultation  LANA:  CPAP  DMII:  SSI  KERRIE on CKDIII with  hyperkalemia:  Renally dose meds; lokelma; gentle IVF  Functional Status: Fall precautions. Up with assistance. PT OT  Diet: general   DVT ppx: Lovenox SCDs  Disposition: Dependent on hospital course. Will discharge once medically stable. SW on board for discharge planning.     Patient Active Problem List   Diagnosis Code    Pneumonia J18.9    DKA, type 2, not at goal (HCC) E11.10    Adenomatous polyp of sigmoid colon D12.5    Adenomatous polyp of descending colon D12.4    Adenomatous polyp of colon D12.6    Adenomatous polyp of transverse colon D12.3    PE (pulmonary thromboembolism) (HCC) I26.99    Lung mass R91.8    Pain R52    Pneumonia due to infectious agent J18.9       Tone Ramsey MD, MD  Admitting Hospitalist    Emergency Contact:

## 2024-08-27 NOTE — ED NOTES
Patient requesting PICC line insertion after it took 16 attempts to start peripheral IV last admission.

## 2024-08-27 NOTE — ED NOTES
Patient recently received results stating he has stage 4 lung CA with mets to spine, liver and stomach. Patient was sent by PCP for admission to start treatment.

## 2024-08-27 NOTE — ED PROVIDER NOTES
Adult Medicine Teaching Service / Internal Medicine Teaching Service (IMTS/AMTS)  Discharge Summary   Patient: Mohini Myers Discharge Date: 3/25/2024 [facility] Hospital: [facility]   YOB: 1961 Admission Date: 3/22/2024 [facility] MS4: Gilda Branch   Medical Record Number: [id number] Admission Length: 1 Days [facility] Team Color: GOLD   Admitting Physician: Mariposa Hawley MD Discharge Physician: Edenilson Vaca MD Outpatient Primary Care Provider: John Currie DO     Admission Information     Admission Diagnoses:   Periumbilical pain [R10.33]  Hernia of abdominal wall [K43.9]  Nausea and vomiting, unspecified vomiting type [R11.2] Primary Discharge Diagnoses:   Mild postoperative ileus, resolved  S/p robotic-assisted hernia repair x2 (3/22/24)  Ventral wall abdominal hernia, incarcerated (new)  Small umbilical hernia, incarcerated (chronic) Secondary Discharge Diagnoses:   Patient Active Problem List   Diagnosis    Hx appendicitis (s/p appendectomy)    Hypertension    Heart failure with preserved EF    Hypothyroidism     Diverticulosis of colon        LACE(5-9):Moderate  Total Score: 9             3 LACE Length of Stay    3 LACE Acute Admission    2 LACE Charlson Comorbidity Index Evalution    1 LACE Visits to ED Previous 6 Months           Admission Condition: Fair    Discharged Condition: Good    Disposition: Home     Hospital Course   Mohini Myers is a 62 year old female with history of umbilical hernia, appendicitis s/p appendectomy (2022), diverticulosis, colon polyp, HTN, HFpEF, and hypothyroidism who was admitted on 3/22/2024 with acute-on-chronic severe abdominal pain and associated vomiting. She was diagnosed with incarcerated incisional ventral hernia (new) and umbilical hernia (chronic), and subsequently underwent robotic-assisted surgical repair x2. No post-op complications noted. There was continued inpatient observation for >2 days due to persistent pain and   [facility] ICU  EMERGENCY DEPARTMENT ENCOUNTER      Pt Name: Gerardo Esquivel  MRN: [id number]  Birthdate 1955  Date of evaluation: 8/27/2024  Provider: Klever Bellamy PA-C  11:35 AM EDT    CHIEF COMPLAINT       Chief Complaint   Patient presents with    Fall    Extremity Weakness    Fatigue         HISTORY OF PRESENT ILLNESS   (Location/Symptom, Timing/Onset, Context/Setting, Quality, Duration, Modifying Factors, Severity)  Note limiting factors.   Gerardo Esquivel is a 69 y.o. male who presents to the emergency department with complaint of generalized weakness, falls at home, inability to manage his activities of daily living.  Per family patient discharged from [facility] yesterday for similar episodes, recent diagnosis of lung cancer, with mets to the spine, ribs, and liver.  Family states since patient going home progressive weakness, unable to ambulate, was found laying on the floor today, patient does not know how he got there, he denies any specific injury from fall, but was not able to get up on his own.  Family contacted her primary doctor, they advised him to return to the ED for reevaluation and admission for generalized weakness, secondary to recent diagnosis of lung cancer, family states patient has not started chemotherapy as of yet he was to see oncologist and possibly start this upcoming Thursday, through [facility].  Family states patient not eating, not drinking.  Patient denies any chest pain or shortness of breath, he is O2 dependent at 4 L nasal cannula oxygen secondary to COPD.  Patient no longer smokes, states he stopped smoking approximately 16 years ago.  Past medical history significant for hypertension, depression, COPD, pulmonary emboli, lung cancer, type 2 diabetes, thyroid cancer, MRSA, coronary disease, hyperlipidemia, Hodgkin's lymphoma, sleep apnea, hypothyroidism, chronic kidney disease.    HPI    Nursing Notes were reviewed.    REVIEW OF SYSTEMS    (2-9 systems for level  concern for potential ileus. However, patient was able to pass flatus and had x1 significant BM yesterday PM. KUB 3/24/2, prior to her BM, was notable for mild abdominal distension. Otherwise, concern for mild ileus has resolved, and patient has remained clinically stable throughout her course. Has been able to tolerate PO regular diet without N/V, regurgitation, or bloating. Main goal for outpatient management is more adequate postoperative pain control and continued ambulation.      PCP to Follow up:  - Postoperative abdominal pain  - Continued BM, postoperative mobility/ ambulation, and regular diet    - Medication changes:       - Pain: PO Norco 5-325 mg x2 q4h PRN per General Surgery        - Miralax 17 g BID PRN        - PPI 40 mg for acid reflux       - Continue metoprolol tartrate 25 mg BID        - Amlodipine held due to borderline/ low BPs       - Continue furosemide 20 mg          - Zofran PRN for nausea   - Recommend re-evaluation of chronic HFpEF, HTN, and hypothyroidism; especially for medication management         Diagnostic Studies/Surgical Procedures:   Procedure(s) (LRB):  ROBOTIC UMBILICAL HERNIA  AND INCISIONAL HERNIA REPAIRS WITH VENTRALIGHT MESH PLACEMENT, USING DA BRYCE XI (N/A)    Consultants:  IP Consult Orders (From admission, onward)       Start     Ordered    03/22/24 1311  Inpatient consult to General Surgery  ONE TIME        Provider:  Kandace Musa MD    03/22/24 1311                  Physical Exam   Visit Vitals  /75   Pulse 83   Temp 98.5 °F (36.9 °C) (Oral)   Resp 16   Ht 5' 2\" (1.575 m)   Wt 116.9 kg (257 lb 11.5 oz)   LMP 01/02/2014   SpO2 93%   BMI 47.14 kg/m²     General: Alert, cooperative, and conversive.   Respiratory:  Bilaterally clear to auscultation; no rhonchi or wheezing. Non-labored respirations. Normal respiratory effort.  Cardiovascular: Regular rate and rhythm and S1, S2 present. Non-pitting LE edema.   Abdomen: Active bowel sounds. Abdomen is soft and  non-distended. Tenderness to palpation in the LUQ and LLQ (around incision site) > RUQ. No guarding. Surgical incisions are clean, dry, and intact.  Psych:  Affect is appropriate with normal social interaction.      Wt Readings from Last 1 Encounters:   03/24/24 116.9 kg (257 lb 11.5 oz)       Recommendations - Instructions - Follow-up     Diet: Regular Diet  Activity:  Activity as Tolerated, Ambulate in Home, and PT/OT recs Wound Care: Keep Wound Clean and Dry     Follow-up Providers/Appointments:  Kandace Musa MD  [address]  [address]  [facility] [address]  [phone]    Follow up on 4/10/2024  For post operative check appointment with one of the surgical NPs or PAs on 4/10/24 at 2pm    Player, DO John  [address] [place] [address]  [phone]    Call in 2 day(s)  If symptoms worsen and for post hospital follow up      Medications        Summary of your Discharge Medications        Take these Medications        Details   furosemide 20 MG tablet  Commonly known as: LASIX   Take 1 tablet by mouth daily.     HYDROcodone-acetaminophen 5-325 MG per tablet  Commonly known as: NORCO   Coyote 1 tableta por vía oral cada 4 horas según sea necesario para el dolor.  (Take 1 tablet by mouth every 4 hours as needed for Pain.)     metoPROLOL tartrate 25 MG tablet  Commonly known as: LOPRESSOR   Destiny 1 tableta por vía oral 2 veces al día.  (Take 1 tablet by mouth 2 times daily.)     ondansetron 4 MG disintegrating tablet  Commonly known as: ZOFRAN ODT   Ponga 1 tableta en la lengua cada 12 horas según sea necesario para las náuseas.  (Place 1 tablet onto the tongue every 12 hours as needed for Nausea.)     pantoprazole 40 MG tablet  Commonly known as: PROTONIX   Take 1 tablet by mouth daily.     polyethylene glycol 17 GM/SCOOP powder  Commonly known as: MIRALAX   Take 17 g by mouth daily for 7 days. Stir and dissolve powder in any 4 to 8 ounces of beverage, then drink.              CMS Best  Practices - MI - HF - STROKE   Quality Indicators   AMI With Heart Failure with Reduced LVEF (<40%)? no  Heart failure?  Yes    LVEF assessment: yes - EF% and date attached            Ejection Fraction   Date Value Ref Range Status   02/29/2024 63 % Final   DVT/VTE Prophylaxis:  VTE Pharmacologic Prophylaxis: Yes (enoxaparin)  VTE Mechanical Prophylaxis: Yes  ____________________________  Gilda Kayleigh Branch MS4   3/25/2024 11:48 AM    This patient's case was discussed with attending physician, Edenilson Lei MD. Please see below or additional note for addendum.     Attending Addendum:   Staff: The patient was seen and the case was discussed in detail with the resident. I have reviewed their documented findings and plan of care and agree.         AMTS/IMTS DC Summary; Dept. of Internal Medicine IMTS/ AMTS. Rev. 5.8; 9/23/15.   Support/ Technical Difficulties: [email or web address]     Hemoglobin 12.2 (*)     Hematocrit 38.8 (*)     MCH 25.9 (*)     MCHC 31.4 (*)     RDW 16.4 (*)     Neutrophils Absolute 54.6 (*)     Lymphocytes Absolute 0.0 (*)     Eosinophils Absolute 2.9 (*)     All other components within normal limits    Narrative:     CALL  [place]  [facility] tel. [phone],  WBCI results called to and read back by SAMANTHA CHILDS, 08/29/2024 05:43, by  THOMAS  Collection has been rescheduled by YASIR at 08/29/2024 03:36 Reason:   Line   POCT GLUCOSE - Abnormal; Notable for the following components:    POC Glucose 123 (*)     All other components within normal limits   POCT ARTERIAL - Abnormal; Notable for the following components:    POC Sodium 131 (*)     POC Potassium 5.3 (*)     POC Glucose 124 (*)     POC Creatinine 2.0 (*)     Est, Glom Filt Rate 36 (*)     pH, Arterial 7.334 (*)     HCO3, Arterial 20.3 (*)     Base Excess, Arterial -6 (*)     POC Hematocrit 40 (*)     All other components within normal limits   POCT GLUCOSE - Abnormal; Notable for the following components:    POC Glucose 140 (*)     All other components within normal limits   POCT ARTERIAL - Abnormal; Notable for the following components:    POC Sodium 132 (*)     POC Potassium 5.9 (*)     POC Glucose 189 (*)     POC Creatinine 2.0 (*)     Est, Glom Filt Rate 36 (*)     pH, Arterial 7.295 (*)     Base Excess, Arterial -5 (*)     POC Hematocrit 39 (*)     Hemoglobin 13.3 (*)     All other components within normal limits   POCT ARTERIAL - Abnormal; Notable for the following components:    POC Sodium 135 (*)     POC Potassium 5.9 (*)     POC Glucose 214 (*)     POC Creatinine 2.0 (*)     Est, Glom Filt Rate 36 (*)     pCO2, Arterial 30 (*)     HCO3, Arterial 17.7 (*)     Base Excess, Arterial -7 (*)     TCO2, Arterial 19 (*)     POC Hematocrit 40 (*)     All other components within normal limits   POCT GLUCOSE - Abnormal; Notable for the following components:    POC Glucose 194 (*)     All other components within normal limits    CULTURE, BLOOD 1    Narrative:     ORDER#: [id number]                          ORDERED BY: ADOLFO LAZO  SOURCE: Blood                              COLLECTED:  08/27/24 20:21  ANTIBIOTICS AT BRYCE.:                      RECEIVED :  08/27/24 20:21   MRSA DNA PROBE, NASAL   LACTIC ACID    Narrative:     Collection has been rescheduled by [facility] at 08/27/2024 14:07 Reason: No   suitable vein for venipuncture   URINALYSIS WITH REFLEX TO CULTURE   MAGNESIUM    Narrative:     CALL  Doherty  [facility] tel. [phone],  Chemistry,BUN results called to and read back by TIMI BROTHERS RN AT ,  08/27/2024 17:40, by ONESIMO  Collection has been rescheduled by [facility] at 08/27/2024 14:07 Reason: No   suitable vein for venipuncture   APTT    Narrative:     Collection has been rescheduled by JN at 08/27/2024 14:07 Reason: No   suitable vein for venipuncture   PATH CONSULT HEMATOLOGY   POCT EPOC BLOOD GAS, LACTIC ACID, ICA   POCT EPOC BLOOD GAS, LACTIC ACID, ICA   POCT EPOC BLOOD GAS, LACTIC ACID, ICA   POCT EPOC BLOOD GAS, LACTIC ACID, ICA   POCT GLUCOSE   POCT GLUCOSE   POCT GLUCOSE   POCT GLUCOSE   POCT GLUCOSE   POCT GLUCOSE   POCT GLUCOSE   POCT GLUCOSE   POCT GLUCOSE   POCT GLUCOSE   POCT GLUCOSE   POCT GLUCOSE   POCT GLUCOSE   POCT GLUCOSE   POCT GLUCOSE       All other labs were within normal range or not returned as of this dictation.    EMERGENCY DEPARTMENT COURSE and DIFFERENTIAL DIAGNOSIS/MDM:   Vitals:    Vitals:    08/29/24 1723 08/29/24 1800 08/29/24 2000 08/29/24 2100   BP:   138/64 (!) 139/57   Pulse:  91 93 95   Resp: 27 24 19 27   Temp:       TempSrc:       SpO2:  96% 96% 92%   Weight:       Height:             Medical Decision Making  Patient presented to the ED with complaint of generalized weakness, falls at home, decreased appetite, and inability to ambulate per family.  Patient was seen and admitted to [facility] on 8/25/2024, and discharged yesterday 26 2024.  Family states since going home patient

## 2024-08-27 NOTE — ED NOTES
Per Jose RICHARDSON, patient is ok to wait for PICC placement. Patient very agreeable to this as he does not \"want to be poked a million times.\"

## 2024-08-28 PROBLEM — Z51.5 PALLIATIVE CARE ENCOUNTER: Status: ACTIVE | Noted: 2024-08-28

## 2024-08-28 PROBLEM — Z71.89 ADVANCED CARE PLANNING/COUNSELING DISCUSSION: Status: ACTIVE | Noted: 2024-08-28

## 2024-08-28 PROBLEM — G89.3 CANCER RELATED PAIN: Status: ACTIVE | Noted: 2024-08-28

## 2024-08-28 PROBLEM — Z71.89 GOALS OF CARE, COUNSELING/DISCUSSION: Status: ACTIVE | Noted: 2024-08-28

## 2024-08-28 PROBLEM — C34.32 MALIGNANT NEOPLASM OF LOWER LOBE OF LEFT LUNG (HCC): Status: ACTIVE | Noted: 2024-08-28

## 2024-08-28 LAB
ALBUMIN SERPL-MCNC: 2.9 G/DL (ref 3.5–4.6)
ALP SERPL-CCNC: 136 U/L (ref 35–104)
ALT SERPL-CCNC: 22 U/L (ref 0–41)
ANION GAP SERPL CALCULATED.3IONS-SCNC: 12 MEQ/L (ref 9–15)
AST SERPL-CCNC: 29 U/L (ref 0–40)
BASE EXCESS ARTERIAL: -5 (ref -3–3)
BASE EXCESS ARTERIAL: -6 (ref -3–3)
BASOPHILS # BLD: 0.9 K/UL (ref 0–0.2)
BASOPHILS NFR BLD: 2 %
BILIRUB SERPL-MCNC: 0.4 MG/DL (ref 0.2–0.7)
BUN SERPL-MCNC: 72 MG/DL (ref 8–23)
CALCIUM IONIZED: 1.25 MMOL/L (ref 1.12–1.32)
CALCIUM IONIZED: 1.31 MMOL/L (ref 1.12–1.32)
CALCIUM SERPL-MCNC: 7.8 MG/DL (ref 8.5–9.9)
CHLORIDE SERPL-SCNC: 103 MEQ/L (ref 95–107)
CO2 SERPL-SCNC: 19 MEQ/L (ref 20–31)
CREAT SERPL-MCNC: 1.63 MG/DL (ref 0.7–1.2)
EOSINOPHIL # BLD: 5.9 K/UL (ref 0–0.7)
EOSINOPHIL NFR BLD: 13 %
ERYTHROCYTE [DISTWIDTH] IN BLOOD BY AUTOMATED COUNT: 16.2 % (ref 11.5–14.5)
ESTIMATED AVERAGE GLUCOSE: 206 MG/DL
GLOBULIN SER CALC-MCNC: 1.7 G/DL (ref 2.3–3.5)
GLUCOSE BLD-MCNC: 124 MG/DL (ref 70–99)
GLUCOSE BLD-MCNC: 140 MG/DL (ref 70–99)
GLUCOSE BLD-MCNC: 189 MG/DL (ref 70–99)
GLUCOSE SERPL-MCNC: 119 MG/DL (ref 70–99)
HBA1C MFR BLD: 8.8 % (ref 4–6)
HCO3 ARTERIAL: 20.3 MMOL/L (ref 21–29)
HCO3 ARTERIAL: 21.8 MMOL/L (ref 21–29)
HCT VFR BLD AUTO: 35.4 % (ref 42–52)
HCT VFR BLD AUTO: 39 % (ref 41–53)
HCT VFR BLD AUTO: 40 % (ref 41–53)
HGB BLD CALC-MCNC: 13.3 GM/DL (ref 13.5–17.5)
HGB BLD CALC-MCNC: 13.6 GM/DL (ref 13.5–17.5)
HGB BLD-MCNC: 11.2 G/DL (ref 14–18)
LACTATE: 1.32 MMOL/L (ref 0.4–2)
LACTATE: 1.39 MMOL/L (ref 0.4–2)
LYMPHOCYTES # BLD: 1.8 K/UL (ref 1–4.8)
LYMPHOCYTES NFR BLD: 3 %
MCH RBC QN AUTO: 26.2 PG (ref 27–31.3)
MCHC RBC AUTO-ENTMCNC: 31.6 % (ref 33–37)
MCV RBC AUTO: 82.7 FL (ref 79–92.2)
METAMYELOCYTES NFR BLD MANUAL: 1 %
MONOCYTES # BLD: 0 K/UL (ref 0.2–0.8)
MONOCYTES NFR BLD: 7.9 %
MYELOCYTES NFR BLD MANUAL: 5 %
NEUTROPHILS # BLD: 37.1 K/UL (ref 1.4–6.5)
NEUTS SEG NFR BLD: 76 %
NRBC BLD-RTO: 1 /100 WBC
O2 SAT, ARTERIAL: 96 % (ref 93–100)
O2 SAT, ARTERIAL: 97 % (ref 93–100)
PATH INTERP BLD-IMP: NORMAL
PATH INTERP BLD-IMP: YES
PCO2 ARTERIAL: 38 MM HG (ref 35–45)
PCO2 ARTERIAL: 45 MM HG (ref 35–45)
PERFORMED ON: ABNORMAL
PH ARTERIAL: 7.29 (ref 7.35–7.45)
PH ARTERIAL: 7.33 (ref 7.35–7.45)
PLATELET # BLD AUTO: 127 K/UL (ref 130–400)
PLATELET BLD QL SMEAR: ABNORMAL
PO2 ARTERIAL: 89 MM HG (ref 75–108)
PO2 ARTERIAL: 92 MM HG (ref 75–108)
POC CHLORIDE: 103 MEQ/L (ref 99–110)
POC CHLORIDE: 105 MEQ/L (ref 99–110)
POC CREATININE: 2 MG/DL (ref 0.8–1.3)
POC CREATININE: 2 MG/DL (ref 0.8–1.3)
POC FIO2: 4
POC SAMPLE TYPE: ABNORMAL
POC SAMPLE TYPE: ABNORMAL
POTASSIUM SERPL-SCNC: 4.9 MEQ/L (ref 3.4–4.9)
POTASSIUM SERPL-SCNC: 5.3 MEQ/L (ref 3.5–5.1)
POTASSIUM SERPL-SCNC: 5.9 MEQ/L (ref 3.5–5.1)
PROT SERPL-MCNC: 4.6 G/DL (ref 6.3–8)
RBC # BLD AUTO: 4.28 M/UL (ref 4.7–6.1)
SLIDE REVIEW: ABNORMAL
SMUDGE CELLS BLD QL SMEAR: 18.3
SODIUM BLD-SCNC: 131 MEQ/L (ref 136–145)
SODIUM BLD-SCNC: 132 MEQ/L (ref 136–145)
SODIUM SERPL-SCNC: 134 MEQ/L (ref 135–144)
TCO2 ARTERIAL: 21 MMOL/L (ref 21–32)
TCO2 ARTERIAL: 23 MMOL/L (ref 21–32)
VARIANT LYMPHS NFR BLD: 1 %
WBC # BLD AUTO: 45.3 K/UL (ref 4.8–10.8)

## 2024-08-28 PROCEDURE — 51798 US URINE CAPACITY MEASURE: CPT

## 2024-08-28 PROCEDURE — 99222 1ST HOSP IP/OBS MODERATE 55: CPT

## 2024-08-28 PROCEDURE — 83605 ASSAY OF LACTIC ACID: CPT

## 2024-08-28 PROCEDURE — 80053 COMPREHEN METABOLIC PANEL: CPT

## 2024-08-28 PROCEDURE — 6360000002 HC RX W HCPCS: Performed by: INTERNAL MEDICINE

## 2024-08-28 PROCEDURE — 82803 BLOOD GASES ANY COMBINATION: CPT

## 2024-08-28 PROCEDURE — 2580000003 HC RX 258: Performed by: INTERNAL MEDICINE

## 2024-08-28 PROCEDURE — 51701 INSERT BLADDER CATHETER: CPT

## 2024-08-28 PROCEDURE — 2500000003 HC RX 250 WO HCPCS: Performed by: INTERNAL MEDICINE

## 2024-08-28 PROCEDURE — 6370000000 HC RX 637 (ALT 250 FOR IP)

## 2024-08-28 PROCEDURE — 2000000000 HC ICU R&B

## 2024-08-28 PROCEDURE — 84295 ASSAY OF SERUM SODIUM: CPT

## 2024-08-28 PROCEDURE — 6370000000 HC RX 637 (ALT 250 FOR IP): Performed by: INTERNAL MEDICINE

## 2024-08-28 PROCEDURE — 82565 ASSAY OF CREATININE: CPT

## 2024-08-28 PROCEDURE — 36600 WITHDRAWAL OF ARTERIAL BLOOD: CPT

## 2024-08-28 PROCEDURE — 2580000003 HC RX 258: Performed by: PHYSICIAN ASSISTANT

## 2024-08-28 PROCEDURE — 82435 ASSAY OF BLOOD CHLORIDE: CPT

## 2024-08-28 PROCEDURE — 82330 ASSAY OF CALCIUM: CPT

## 2024-08-28 PROCEDURE — 85025 COMPLETE CBC W/AUTO DIFF WBC: CPT

## 2024-08-28 PROCEDURE — 2700000000 HC OXYGEN THERAPY PER DAY

## 2024-08-28 PROCEDURE — 85014 HEMATOCRIT: CPT

## 2024-08-28 PROCEDURE — 84132 ASSAY OF SERUM POTASSIUM: CPT

## 2024-08-28 RX ORDER — SENNOSIDES A AND B 8.6 MG/1
1 TABLET, FILM COATED ORAL 2 TIMES DAILY
Status: DISCONTINUED | OUTPATIENT
Start: 2024-08-28 | End: 2024-08-29 | Stop reason: HOSPADM

## 2024-08-28 RX ORDER — OXYCODONE AND ACETAMINOPHEN 5; 325 MG/1; MG/1
1 TABLET ORAL EVERY 6 HOURS PRN
Status: DISCONTINUED | OUTPATIENT
Start: 2024-08-28 | End: 2024-08-29 | Stop reason: HOSPADM

## 2024-08-28 RX ORDER — POLYETHYLENE GLYCOL 3350 17 G/17G
17 POWDER, FOR SOLUTION ORAL ONCE
Status: DISCONTINUED | OUTPATIENT
Start: 2024-08-28 | End: 2024-08-29 | Stop reason: HOSPADM

## 2024-08-28 RX ORDER — DIPHENHYDRAMINE HYDROCHLORIDE 50 MG/ML
50 INJECTION INTRAMUSCULAR; INTRAVENOUS ONCE
Status: COMPLETED | OUTPATIENT
Start: 2024-08-28 | End: 2024-08-28

## 2024-08-28 RX ORDER — TAMSULOSIN HYDROCHLORIDE 0.4 MG/1
0.4 CAPSULE ORAL ONCE
Status: DISCONTINUED | OUTPATIENT
Start: 2024-08-28 | End: 2024-08-29 | Stop reason: HOSPADM

## 2024-08-28 RX ORDER — TAMSULOSIN HYDROCHLORIDE 0.4 MG/1
0.8 CAPSULE ORAL DAILY
Status: DISCONTINUED | OUTPATIENT
Start: 2024-08-29 | End: 2024-08-29 | Stop reason: HOSPADM

## 2024-08-28 RX ADMIN — GUAIFENESIN 600 MG: 600 TABLET ORAL at 10:06

## 2024-08-28 RX ADMIN — LEVOTHYROXINE SODIUM 150 MCG: 150 TABLET ORAL at 06:22

## 2024-08-28 RX ADMIN — ENOXAPARIN SODIUM 30 MG: 100 INJECTION SUBCUTANEOUS at 20:58

## 2024-08-28 RX ADMIN — SODIUM CHLORIDE, PRESERVATIVE FREE 10 ML: 5 INJECTION INTRAVENOUS at 20:58

## 2024-08-28 RX ADMIN — SENNOSIDES 8.6 MG: 8.6 TABLET, FILM COATED ORAL at 20:57

## 2024-08-28 RX ADMIN — METHYLPREDNISOLONE SODIUM SUCCINATE 40 MG: 40 INJECTION INTRAMUSCULAR; INTRAVENOUS at 20:57

## 2024-08-28 RX ADMIN — INSULIN GLARGINE 17 UNITS: 100 INJECTION, SOLUTION SUBCUTANEOUS at 20:57

## 2024-08-28 RX ADMIN — CEFEPIME 2000 MG: 2 INJECTION, POWDER, FOR SOLUTION INTRAVENOUS at 00:23

## 2024-08-28 RX ADMIN — OXYCODONE HYDROCHLORIDE AND ACETAMINOPHEN 1 TABLET: 5; 325 TABLET ORAL at 11:31

## 2024-08-28 RX ADMIN — VANCOMYCIN HYDROCHLORIDE 2250 MG: 1 INJECTION, POWDER, LYOPHILIZED, FOR SOLUTION INTRAVENOUS at 01:00

## 2024-08-28 RX ADMIN — SENNOSIDES 8.6 MG: 8.6 TABLET, FILM COATED ORAL at 10:06

## 2024-08-28 RX ADMIN — CEFEPIME 2000 MG: 2 INJECTION, POWDER, FOR SOLUTION INTRAVENOUS at 23:52

## 2024-08-28 RX ADMIN — CEFEPIME 2000 MG: 2 INJECTION, POWDER, FOR SOLUTION INTRAVENOUS at 11:35

## 2024-08-28 RX ADMIN — SODIUM CHLORIDE 1250 MG: 9 INJECTION, SOLUTION INTRAVENOUS at 23:42

## 2024-08-28 RX ADMIN — DIPHENHYDRAMINE HYDROCHLORIDE 50 MG: 50 INJECTION INTRAMUSCULAR; INTRAVENOUS at 23:37

## 2024-08-28 RX ADMIN — TAMSULOSIN HYDROCHLORIDE 0.4 MG: 0.4 CAPSULE ORAL at 10:07

## 2024-08-28 RX ADMIN — SODIUM CHLORIDE, PRESERVATIVE FREE 10 ML: 5 INJECTION INTRAVENOUS at 10:07

## 2024-08-28 RX ADMIN — GUAIFENESIN 600 MG: 600 TABLET ORAL at 20:57

## 2024-08-28 RX ADMIN — METOPROLOL SUCCINATE 100 MG: 100 TABLET, EXTENDED RELEASE ORAL at 10:07

## 2024-08-28 RX ADMIN — PREDNISONE 10 MG: 10 TABLET ORAL at 10:07

## 2024-08-28 RX ADMIN — ENOXAPARIN SODIUM 30 MG: 100 INJECTION SUBCUTANEOUS at 10:07

## 2024-08-28 RX ADMIN — PANTOPRAZOLE SODIUM 40 MG: 40 TABLET, DELAYED RELEASE ORAL at 10:07

## 2024-08-28 ASSESSMENT — ENCOUNTER SYMPTOMS
VOMITING: 0
VOICE CHANGE: 0
TROUBLE SWALLOWING: 0
COUGH: 1
BACK PAIN: 1
SHORTNESS OF BREATH: 1
NAUSEA: 0

## 2024-08-28 ASSESSMENT — PAIN DESCRIPTION - LOCATION: LOCATION: BACK

## 2024-08-28 NOTE — RT PROTOCOL NOTE
RT Inhaler-Nebulizer Bronchodilator Protocol Note    There is a bronchodilator order in the chart from a provider indicating to follow the RT Bronchodilator Protocol and there is an “Initiate RT Inhaler-Nebulizer Bronchodilator Protocol” order as well (see protocol at bottom of note).    CXR Findings:  XR CHEST PORTABLE    Result Date: 8/27/2024  1. Patchy left perihilar and left lower lobe airspace disease 2. Small left pleural effusion. 3. The right lung is clear.       The findings from the last RT Protocol Assessment were as follows:   History Pulmonary Disease: Chronic pulmonary disease  Respiratory Pattern: Regular pattern and RR 12-20 bpm  Breath Sounds: Clear breath sounds  Cough: Strong, spontaneous, non-productive  Indication for Bronchodilator Therapy: On home bronchodilators  Bronchodilator Assessment Score: 2    Aerosolized bronchodilator medication orders have been revised according to the RT Inhaler-Nebulizer Bronchodilator Protocol below.    Respiratory Therapist to perform RT Therapy Protocol Assessment initially then follow the protocol.  Repeat RT Therapy Protocol Assessment PRN for score 0-3 or on second treatment, BID, and PRN for scores above 3.    No Indications - adjust the frequency to every 6 hours PRN wheezing or bronchospasm, if no treatments needed after 48 hours then discontinue using Per Protocol order mode.     If indication present, adjust the RT bronchodilator orders based on the Bronchodilator Assessment Score as indicated below.  Use Inhaler orders unless patient has one or more of the following: on home nebulizer, not able to hold breath for 10 seconds, is not alert and oriented, cannot activate and use MDI correctly, or respiratory rate 25 breaths per minute or more, then use the equivalent nebulizer order(s) with same Frequency and PRN reasons based on the score.  If a patient is on this medication at home then do not decrease Frequency below that used at home.    0-3 - enter  or revise RT bronchodilator order(s) to equivalent RT Bronchodilator order with Frequency of every 4 hours PRN for wheezing or increased work of breathing using Per Protocol order mode.        4-6 - enter or revise RT Bronchodilator order(s) to two equivalent RT bronchodilator orders with one order with BID Frequency and one order with Frequency of every 4 hours PRN wheezing or increased work of breathing using Per Protocol order mode.        7-10 - enter or revise RT Bronchodilator order(s) to two equivalent RT bronchodilator orders with one order with TID Frequency and one order with Frequency of every 4 hours PRN wheezing or increased work of breathing using Per Protocol order mode.       11-13 - enter or revise RT Bronchodilator order(s) to one equivalent RT bronchodilator order with QID Frequency and an Albuterol order with Frequency of every 4 hours PRN wheezing or increased work of breathing using Per Protocol order mode.      Greater than 13 - enter or revise RT Bronchodilator order(s) to one equivalent RT bronchodilator order with every 4 hours Frequency and an Albuterol order with Frequency of every 2 hours PRN wheezing or increased work of breathing using Per Protocol order mode.     RT to enter RT Home Evaluation for COPD & MDI Assessment order using Per Protocol order mode.    Electronically signed by Jose J Vasques RCP on 8/27/2024 at 10:18 PM

## 2024-08-28 NOTE — CONSULTS
Palliative Care Consult Note  Patient: Gerardo Esquivel  Gender: male  YOB: 1955  Unit/Bed: W184/W184-01  CodeStatus: Full Code  Inpatient Treatment Team: Treatment Team:   Tone Ramsey MD Zaizafoun, Manaf, MD Sidloski, Jay E, DO Cason, Deanna L, RN Hancock, Tara L, Nereida Hayden RN Xia, Semies, Marlen Hagan RN  Admit Date:  8/27/2024    Chief Complaint: Dyspnea    History of Presenting Illness:      Gerardo Esquivel is a 69 y.o. male on hospital day 1 with a history of CAD, CKD, COPD, HTN, HLD, hypothyroidism, DMII, Metastatic Lung CA..    Patient was brought to the emergency room with dyspnea, increased weakness and worsening hemoptysis. Patient was admitted in the setting of Sepsis, pneumonia, lung ca.    Palliative care was consulted by Dr. Ramsey for goals of care.     Upon entering the room I find the patient alert and oriented x3, he is on NC. He is c/o of \"not feeling good\". He denies CP, denies nausea/vomiting. Denies fever/chills. His SOB is ongoing but denies it being currently worse. He reports 9/10 right shoulder and back pain. He reports constipation, last BM 4 days ago. No family at bedside. He is does live at home with long time girlfriend. No HPOA. Reports he would like to make his girlfriend HPOA. Reports increased falls and functional decline since Lung CA dx.     Most recent notable labs:   Sodium 134, potassium 4.9 BUN 72 creatinine 1.6.  GFR is 45.8 albumin 2.9 WBC 45.3 hemoglobin 11.2 hematocrit 35.4      Review of Systems:       Review of Systems   Constitutional:  Positive for activity change, appetite change and fatigue.   HENT:  Negative for trouble swallowing and voice change.    Respiratory:  Positive for cough and shortness of breath.    Gastrointestinal:  Negative for nausea and vomiting.   Musculoskeletal:  Positive for back pain, gait problem and myalgias. Negative for joint swelling.   Skin:  Negative for wound.   Neurological:  Positive for  options contingent on treatment response and QOL. Much active listening, presence, and emotional support were given.    I have discussed/reviewed the patient's case with nursing.    -Patient is currently being treated for multiple medical conditions by other providers  Sepsis Pneumonia  Lung cancer with bone mets  LANA  DMII  KERRIE    MDM: moderate    Electronically signed by ROBERT Reyes CNP on 8/28/2024 at 1:23 PM    Collaborating physician: Dr. Calderón     Please note this report is partially produced by using speech recognition hardware.  It may contain errors related to the system, including grammar, punctuation and spelling as well as words and phrases that may seem inaccurate.  For any questions or concerns feel free to contact me for clarification.    Thanks for the opportunity you have allowed us to provide palliative care to Gerardo Esquivel. We will be in touch as care progresses. Please feel free to reach out to us should you have any questions or requests.

## 2024-08-28 NOTE — CARE COORDINATION
Referral sent to the [facility].   Pending PT/OT eval.     Fax: [phone]. Pending acceptance and need pre-cert.      LSW will follow.     Electronically signed by TIFFANIE Espinal on 8/28/2024 at 11:36 AM

## 2024-08-28 NOTE — PROGRESS NOTES
[facility]   Pharmacy Pharmacokinetic Monitoring Service - Vancomycin     Gerardo Esquivel is a 69 y.o. male starting on vancomycin therapy for pneumonia (CAP) x 7 days. Pharmacy consulted by Dr Ramsey for monitoring and adjustment.    Target Concentration: Goal AUC/RADHA 400-600 mg*hr/L    Additional Antimicrobials: cefepime    Pertinent Laboratory Values:   Wt Readings from Last 1 Encounters:   08/27/24 111.1 kg (245 lb)     Temp Readings from Last 1 Encounters:   08/27/24 97.7 °F (36.5 °C) (Oral)     Estimated Creatinine Clearance: 47 mL/min (A) (based on SCr of 1.89 mg/dL (H)).  Recent Labs     08/25/24  1054 08/27/24  1621   CREATININE 1.58* 1.89*   BUN 53* 76*   WBC 51.1* 51.7*     Procalcitonin:   Recent Labs     08/27/24  1621   PROCAL 0.32*         Pertinent Cultures:  Culture Date Source Results   8/27/24 blood pending   MRSA Nasal Swab: was ordered by provider, awaiting results.    Plan:  Dosing recommendations based on Bayesian software  Start vancomycin 2250mg IV x 1 loading dose, followed by vancomycin 1250mg IV q24hr  Anticipated AUC of 526 and trough concentration of 16.5 mg/L at steady state  Renal labs as indicated   Vancomycin concentration ordered for 08/30/24 @ 0600   Pharmacy will continue to monitor patient and adjust therapy as indicated    Thank you for the consult,  Mike Magallon [facility]  8/28/2024 1:56 AM

## 2024-08-28 NOTE — CARE COORDINATION
Case Management Assessment  Initial Evaluation    Date/Time of Evaluation: 8/28/2024 10:50 AM  Assessment Completed by: Fina Ceballos    If patient is discharged prior to next notation, then this note serves as note for discharge by case management.    Patient Name: Gerardo Esquivel                   YOB: 1955  Diagnosis: General weakness [R53.1]  Pneumonia due to infectious agent [J18.9]  KERRIE (acute kidney injury) (HCC) [N17.9]  Hx of cancer of lung [Z85.118]  Fall, initial encounter [W19.XXXA]  Pneumonia of left lower lobe due to infectious organism [J18.9]  Leukocytosis, unspecified type [D72.829]                   Date / Time: 8/27/2024 10:57 AM    Patient Admission Status: Inpatient   Readmission Risk (Low < 19, Mod (19-27), High > 27): Readmission Risk Score: 20.2    Current PCP: Sandra Barrera MD  PCP verified by CM? Yes    Chart Reviewed: Yes      History Provided by: Patient  Patient Orientation: Alert and Oriented, Person, Place, Situation    Patient Cognition: Alert    Hospitalization in the last 30 days (Readmission):  No    If yes, Readmission Assessment in CM Navigator will be completed.    Advance Directives:      Code Status: Full Code   Patient's Primary Decision Maker is: Legal Next of Kin    Primary Decision Maker: Krys Almeida - Other - [phone]    Secondary Decision Maker: Live Esquivel - Brother/Sister - [phone]    Discharge Planning:    Patient lives with: Spouse/Significant Other Type of Home: House  Primary Care Giver: Self  Patient Support Systems include: Spouse/Significant Other   Current Financial resources:    Current community resources:    Current services prior to admission: Home Care (was suppose to start tomorrow)            Current DME:              Type of Home Care services:  None    ADLS  Prior functional level: Independent in ADLs/IADLs  Current functional level: Independent in ADLs/IADLs    PT AM-PAC:   /24  OT AM-PAC:   /24    Family can provide  assistance at DC: Yes  Would you like Case Management to discuss the discharge plan with any other family members/significant others, and if so, who? No  Plans to Return to Present Housing: Unknown at present  Other Identified Issues/Barriers to RETURNING to current housing: LIVES ALONE, HAS HOME O2 AT 3L WITH MEDICAL SERVICES, BROTHER EDWARD AND PATIENT AGREEABLE TO SNF IF NEEDED.  Potential Assistance needed at discharge: N/A            Potential DME:    Patient expects to discharge to: House  Plan for transportation at discharge:      Financial    Payor: [employer] MEDICARE / Plan: [employer] CHOICE-PPO MEDICARE / Product Type: *No Product type* /     Does insurance require precert for SNF: Yes    Potential assistance Purchasing Medications:    Meds-to-Beds request: Yes      [facility] [address] [place] [address] - [address] [facility] [phone] - F [phone]  [address] [facility] [address]  Phone: [phone] Fax: [phone]      Notes:    Factors facilitating achievement of predicted outcomes: Family support, Caregiver support, Cooperative, and Pleasant    Barriers to discharge: Medical complications    Additional Case Management Notes: MET WITH PATIENT AT BEDSIDE TO DISCUSS DISCHARGE PLAN, BROTHER EDWARD AT BEDSIDE ASSISTS PATIENT AS NEEDED, HENRIQUE GREEN DRIVES PATIENT AND ASSISTS PATIENT AS NEEDED. PATIENT WEARS HOME O2 AT 3-4 L HAS O2 TANK, PORT O2, CONCENTRATOR WITH MEDICAL SERVICES. PATIENT HAS CANE, WALKER, SH. CHAIR. PATIENT LIVES ALONE. PATIENT GIVEN FREEDOM OF CHOICE. PATIENT AGREEABLE TO [facility], VS THE [facility] PEND PT EVAL PEND MEDICAL CLEARANCE. SEMIES JOSÉ MIGUEL UPDATED. DISCHARGE PLAN THE [facility] VS THE [facility]. SW/CM WILL FOLLOW.    The Plan for Transition of Care is related to the following treatment goals of General weakness [R53.1]  Pneumonia due to infectious agent [J18.9]  KERRIE (acute kidney injury) (HCC) [N17.9]  Hx of cancer of lung [Z85.118]  Fall, initial encounter

## 2024-08-28 NOTE — CONSULTS
NURSING ASSESSMENT     Date: 8/28/2024        Patient Name: Gerardo Esquivel     YOB: 1955      Age:  69 y.o.      MRN: [id number]       Chaperone [] Yes   [x] No      Advance Directives:   Do you currently have completed advance directives (living will)? [] Yes   [x] No         *If yes, please bring us a copy for your records.  *If no, would you like info or assistance in completing advance directives (living will)?   [] Yes   [x] No    Pain Score:   Pain Score (1-10): Denies   Pain Location: NA   Pain Duration: NA   Pain Management/Control: Presribed percocet 5 - 325 mg every 6 hrs prn. Patient states he had a pain pill before leaving the hospital      Is pain affecting your ability to take care of yourself or move throughout your home?                        [x] Yes   [] No    General: Weakness and Fatigue  Patient has gained weight [] Yes   [] No  Patient has lost weight [] Yes   [] No  How much weight in pounds and over what length of time:     Eyes (Ophthalmic): Wears glasses     Skin (Dermatological): PICC line to right upper arm, diabetic monitor to left upper arem     ENT: No Problems     Respiratory: Currently a weak unproductive cough. States he was coughing up blood a couple weeks ago, but then states he was coughing up blood before he came to the hospital. Wears oxygen at 4L at home.     Cardiovascular: Denies chest pain or pressure      Device   [] Yes   [x] No   Copy of Card Obtained [] Yes   [x] No    Gastrointestinal: No Problems    Genito-Urinary: Patient was straight cathed this am for 1800ml per ambulance staff.      Breast: No Problems     Musculoskeletal: Immobility-Uses assistive device for ambulation before admission    Neurological: Patient is alert and oriented, but is  somewhat lethargic at intervals      Hematological and Lymphatic: No Problems     Endocrine: Diabetes Mellitus and Thyroid Problems        A 10-point review of systems  has been conducted and pertinent positives  have been   recorded. All other review of systems are negative    Was the patient admitted during the course of treatment OR within 30 days of treatment?     If yes: Patient states he was having SOB and coughing up blood.  /Date of Admission: 8/27/24 to current.  Hospital: [facility]    Additional Comments: Pall Care seen in the hospital    PALLIATIVE CARE SCREENING TOOL    Patient Scenarios               (Score 1 Point Each)  The Patient has  A chronic illness with uncontrolled symptoms (e.g. pain, nausea, vomiting, shortness of breath, anorexia)   [x]  Unresolved psychosocial or spiritual issues                                                                                                     []  Frequent visits to the Emergency Department  (>1 x per month or >2 in last 3 months)                                 []  More than one hospital admission in past 90 days                  []   Complex care requirements (e.g. functional dependency, complex home support for ventilator/antibiotics/feedings, patient in SNF/LTAC/nursing home) [x]  No advance directives in place                                                                                                                         []  TOTAL FOR SECTION #1- 2      Basic Disease Processes             (Score 3 Points Overall)  Locally advanced or metastatic cancer           [x]  Non-cancer life-limiting illness (COPD, CHF, advanced dementia, stroke)      [x]  Total score for section # 2-   3      Concomitant Disease Processes            (Score 1 Point Overall)  COPD               [x]   Renal Disease              []  CHF               []  Liver Disease              []  Other significant comorbidities (e.g. failure to thrive, feeding intolerance, functional decline)    []  Total score for section # 3-  1      Physician to complete #4, #5, & #6      Symptom Assessment             (Score 1 Point Each)  Severe/Uncontrolled Pain (e.g. patients who are opiate naïve

## 2024-08-28 NOTE — CARE COORDINATION
[facility] WAS GIVEN REFERRAL BY ADRIANNA HOUSTON AT [facility] UPDATED ON NEW PLAN, WILL FOLLOW.

## 2024-08-28 NOTE — PROGRESS NOTES
Hospitalist Progress Note      PCP: Sandra Barrera MD    Date of Admission: 8/27/2024    Chief Complaint:    Chief Complaint   Patient presents with    Fall    Extremity Weakness    Fatigue       Subjective:  Patient denies fevers, chills, sweats, CP, SOB, diarrhea or burning micturition.  12 point ROS negative other than mentioned above       Medications:  Reviewed    Infusion Medications    sodium chloride      dextrose       Scheduled Medications    vancomycin  1,250 mg IntraVENous Q24H    senna  1 tablet Oral BID    polyethylene glycol  17 g Oral Once    tamsulosin  0.4 mg Oral Once    [START ON 8/29/2024] tamsulosin  0.8 mg Oral Daily    sodium chloride flush  5-40 mL IntraVENous 2 times per day    levothyroxine  150 mcg Oral Daily    metoprolol succinate  100 mg Oral Daily    pantoprazole  40 mg Oral Daily    predniSONE  10 mg Oral Daily    enoxaparin  30 mg SubCUTAneous BID    guaiFENesin  600 mg Oral BID    cefepime  2,000 mg IntraVENous Q12H    insulin glargine  0.15 Units/kg SubCUTAneous Nightly    insulin lispro  0-4 Units SubCUTAneous TID WC    insulin lispro  0-4 Units SubCUTAneous Nightly    vancomycin (VANCOCIN) intermittent dosing (placeholder)   Other RX Placeholder     PRN Meds: oxyCODONE-acetaminophen, sodium chloride flush, sodium chloride, traMADol, acetaminophen **OR** acetaminophen, ondansetron **OR** ondansetron, potassium chloride **OR** potassium alternative oral replacement **OR** potassium chloride, magnesium sulfate, guaiFENesin-dextromethorphan, glucose, dextrose bolus **OR** dextrose bolus, glucagon (rDNA), dextrose, albuterol, ipratropium      Intake/Output Summary (Last 24 hours) at 8/28/2024 1345  Last data filed at 8/28/2024 0509  Gross per 24 hour   Intake 1200 ml   Output 1116 ml   Net 84 ml       Exam:    /67   Pulse (!) 101   Temp 97.7 °F (36.5 °C) (Oral)   Resp 25   Ht 1.778 m (5' 10\")   Wt 111.1 kg (245 lb)   SpO2 97%   BMI 35.15 kg/m²     General appearance:

## 2024-08-28 NOTE — PROGRESS NOTES
08/27/24 2200   RT Protocol   History Pulmonary Disease 2   Respiratory pattern 0   Breath sounds 0   Cough 0   Indications for Bronchodilator Therapy On home bronchodilators   Bronchodilator Assessment Score 2

## 2024-08-28 NOTE — PROGRESS NOTES
1855 Pt arrived to icu bed 6 via ems. Pt is alert and orient to self. Vss, will continue to monitor. Electronically signed by María Caro RN on 8/28/2024 at 7:03 PM

## 2024-08-28 NOTE — ONCOLOGY
Hematology/Oncology Consult  Encounter Date: 2024 8:18 AM    Mr. Gerardo Esquivel is a 69 y.o. male  : 1955  MRN: [id number]  Acct Number: [id number]  Requesting Provider: Tone Ramsey MD    Reason for request: lung cancer      CONSULTANT: Flo Chen DO    HPI: Patient diagnosed with metastatic lung cancer in 2024.  Admitted for hemoptysis and SOB.    Patient Active Problem List   Diagnosis    Pneumonia    DKA, type 2, not at goal (HCC)    Adenomatous polyp of sigmoid colon    Adenomatous polyp of descending colon    Adenomatous polyp of colon    Adenomatous polyp of transverse colon    PE (pulmonary thromboembolism) (HCC)    Lung mass    Pain    Pneumonia due to infectious agent     Past Medical History:   Diagnosis Date    CAD (coronary artery disease)     Chronic cough     CKD (chronic kidney disease), stage III (HCC)     COPD (chronic obstructive pulmonary disease) (HCC)     Depression     H/O pneumothorax     x 2    Hodgkin disease (HCC)     Hyperlipidemia     Hypertension     Hypothyroid     Lung mass     MRSA infection 2014    Left Groin     Pulmonary embolism (HCC)     Sleep apnea     Thyroid cancer (HCC)     Type II or unspecified type diabetes mellitus without mention of complication, not stated as uncontrolled      @PSH@  Family History   Problem Relation Age of Onset    Stroke Mother     Heart Disease Father     Colon Cancer Neg Hx      Social History     Socioeconomic History    Marital status:      Spouse name: Not on file    Number of children: Not on file    Years of education: Not on file    Highest education level: Not on file   Occupational History     Comment: exposures to diesel fuels   Tobacco Use    Smoking status: Former     Current packs/day: 0.00     Average packs/day: 2.0 packs/day for 35.0 years (70.0 ttl pk-yrs)     Types: Cigarettes     Start date:      Quit date: 2008     Years since quittin.6    Smokeless tobacco: Never   Vaping Use    Vaping  Multiplanar multisequence MRI of the head/brain was performed without and with the administration of intravenous contrast. COMPARISON: None. HISTORY: ORDERING SYSTEM PROVIDED HISTORY: Malignant neoplasm of upper lobe of right lung (HCC) TECHNOLOGIST PROVIDED HISTORY: STAT Creatinine as needed:->No What reading provider will be dictating this exam?->CRC FINDINGS: 20 mL MultiHance utilized There are no areas of restricted diffusion to suggest acute intracranial ischemia.  No intracranial hemorrhage or edema.  No hydrocephalus.  No abnormal extra-axial fluid collections.  No abnormal parenchymal enhancement or leptomeningeal enhancement.  No evidence of calvarial lesion.  The paranasal sinuses and mastoid air cells are clear.  Midline structures including the pituitary gland, corpus callosum, and brainstem are unremarkable. IMPRESSION: No evidence of intracranial metastases.   Interpreted by: Enrique Lyons DO Signed by: Enrique Lyons DO 8/20/24 Final result    PET/CT skull base to mid thigh    Result Date: 8/5/2024  HISTORY:   Initial staging left lung carcinoma. TECHNIQUE: 11.8mCi of FDG were injected with a fasting blood sugar of 189 mg/dl followed by dedicated PET whole body images 60minutes following left hand intravenous administration. Metabolic FDG images were coregistered with axial CT.   FINDINGS Neck/chest:  Hypermetabolic left perihilar (lower lobe) approximate 5.0 cm mass ( maximum SUV 6) with contiguous extension into the ipsilateral left hilum, similar size lymph node aggregate. Subcarinal hypermetabolic 1.0-2.0 cm lymph node aggregate right of midline. Multiple hypermetabolic pleural-based calcified and noncalcified nodules ( right to left), majority approximate 1.0 cm. Right apical/upper lobe presumed surgical material. Normal pharyngeal mucosal space activity. No pleural or pericardial effusion. Abdomen/pelvis: Scattered subcentimeter hypermetabolic foci throughout both right and left hepatic

## 2024-08-28 NOTE — PROGRESS NOTES
2040 pt arrived to [facility] via cart. Pt was able to stand and pivot onto bed. Pt A&O afebrile. Pt vitals stable HR 85 BP 19/70 Resp 25 Pt states no C/O Pain at this time. Pt recently started using a Rolator and cane at home. Pt wears glasses, but doesn't have hearing aides. Pt does have upper and lower dentures, but left them at home. Pt wife at bedside. Pt admission assessment complete. Skin intact w/ a little redness to his buttocks. Pt has a Right Brachial picc. Pt oriented to room and call light. Bed in lowest position/bed alarm on. Will continue to monitor pt.    Perfect serve sent to Jessy BONILLA regarding pt up and down through out the night. Bladder scan pt due to pt only having out 200ML through out the night. Pt has over 400 in bladder. Perfect serve sent regarding bladder scan. Pt vitals stable, but is tripoding and C/O SOB stating at 97% on 4L Dr. Robin to see pt order placed for ABG. Also let Dr. Robin know that pt has over 400 in his bladder at this time.    0500 Pt straight cath with 1000ml out post void cath residual 116.

## 2024-08-28 NOTE — ACP (ADVANCE CARE PLANNING)
Advance Care Planning   Healthcare Decision Maker:    Primary Decision Maker: Keron Almeidaa - Other - [phone]    Secondary Decision Maker: Live Esquivel - Brother/Sister - [phone]    Click here to complete Healthcare Decision Makers including selection of the Healthcare Decision Maker Relationship (ie \"Primary\").  Today we documented Decision Maker(s) consistent with Legal Next of Kin hierarchy.

## 2024-08-28 NOTE — PROGRESS NOTES
[facility]   Pharmacy Dose Adjustment Per Protocol:  Cefepime Extended Interval Interchange    Gerardo Esquivel is a 69 y.o. male.     The following ordered dose of Cefepime has been changed to optimize its pharmacodynamic profile per [facility] pharmacy policy approved by [employer].    Recent Labs     08/25/24  1054 08/27/24  1621   CREATININE 1.58* 1.89*   BUN 53* 76*   WBC 51.1* 51.7*     .  Height: 177.8 cm (5' 10\"), Weight - Scale: 111.1 kg (245 lb), Body mass index is 35.15 kg/m².  Estimated Creatinine Clearance: 47 mL/min (A) (based on SCr of 1.89 mg/dL (H)).    Ordered Dose    __ 1 gm IV every 8-12 hrs  (30 minute infusion)      _X_ 2 gm  IV every 8-12 hrs (30 minute infusion)    New Dose    Cefepime - Extended Infusion (4-hour infusion) - Preferred Dosing Strategy     Renal Function (CrCl mL/min)       ? 60        30 - 59   11 - 29     ? 10, HD        PD             CRRT    All indications - Loading dose of 2000 milligrams x 1 over 30 minutes or via IV push. Maintenance dose  should begin at the next regularly scheduled dosing interval based on indication/renal function.    Intra-abdominal infections, Skin and soft tissue infections, Urinary tract infections  2000mg q12h ¨ 2000mg q24h ¨ 1000mg q24h ¨ 500mg q24h ¨ 1000mg q24h ¨  2000mg q24h^ ¨   Bacteremia, CNS infections, Cystic fibrosis, Diabetic foot infections, Endocarditis, Febrile neutropenia, Healthcareassociated infections, Osteomyelitis/joint infections,   Pneumonia, Sepsis, BMI > 40*  2000mg q8h ¨ 2000mg q12h [x] 1000mg q12h ¨ 1000mg q24h ¨ 1000mg q24h ¨  2000mg q12h† ¨   *Consider 2000mg q12h for indication of Urinary tract infections in BMI > 40. Adjust for decreased renal function.   ^Consider 2000mg q12h for CRRT effluent rates > 3L/h   †Consider 2000mg q8h for CRRT effluent rates ? 3L/h         Pharmacists should be contacted for issues concerning drug compatibility with multiple IV medications.  All doses will be prepared using 50ml bag

## 2024-08-28 NOTE — CARE COORDINATION
Met with patient.  Definition of pneumonia discussed.   Causes of different types of pneumonia reviewed.   Symptoms discussed and pt does understand that they may have some or all of these symptoms.   Testing to diagnose pneumonia reviewed as well as possible treatments.  Importance of avoiding infections discussed including: taking medication as directed, washing hands, disposing of used tissues, getting the pneumonia and flu vaccines, and avoiding others who are ill.   Importance of not smoking and to avoid others who may be smoking around patient stressed.   Pneumonia Zones also reviewed. \"Green\" zone is the goal, \"Yellow\" zone means to call the doctor, and \"Red\" zone means to call the doctor ASAP or call 911.   Copies of Pneumonia booklet and Zone Pamphlet given to patient.   Offer for patient to express any concerns or questions. Pt denies having further questions at this time.     Electronically signed by Dai Patino RN on 8/28/2024 at 9:11 AM

## 2024-08-28 NOTE — ACP (ADVANCE CARE PLANNING)
Advance Care Planning   Healthcare Decision Maker:    Primary Decision Maker: Keron Almeidaa - Other - [phone]    Secondary Decision Maker: Live Esquivel - Brother/Sister - [phone]    Click here to complete Healthcare Decision Makers including selection of the Healthcare Decision Maker Relationship (ie \"Primary\").  Today we documented Decision Maker(s) consistent with Legal Next of Kin hierarchy.

## 2024-08-28 NOTE — PROGRESS NOTES
Comprehensive Nutrition Assessment    Type and Reason for Visit:  Initial, Positive Nutrition Screen (+ malnutrition screen)    Nutrition Recommendations/Plan:   Continue General diet as tolerated ( Add No Concentrated Sweets) for gluc > 180  Add diabetic oral supplement @ B & D  Please obtain actual weight for malnutrition screening and completion of nutrition assessment        Malnutrition Assessment:  Malnutrition Status:  Insufficient data (08/28/24 1430)        Nutrition Assessment:    Pt at high risk for malnutrition related to metastatic disease, need current weight for further assessment ( up to chair at time of visit), has hx of DM, however glucose currently acceptable, will continue to monitor for need of CHO restriction, begin an oral nutrition supplement to aid in meeting energy and protein needs    Nutrition Related Findings:    \" PMH of CAD, CKD, COPD, HTN, HLD, hypothyroidism, DMII who presents with shortness of breath.     The patient has recently been diagnosed with metastatic lung cancer (stating he has mets to spine, liver and stomach.).  He has progressively felt weaker, more short of breath ...He has not been eating or drinking well with a loss of appetite.  Denies abdominal pain or tenderness.  Had diarrhea over the weekend but has since resolved.  \" admitted in the setting of Sepsis, pneumonia, lung ca, upin chair unable to weigh, observed < 50% of lunch tray consumed, relevant labs noted, meds reviewed, mild, generalized edema reported by nursing Wound Type: None       Current Nutrition Intake & Therapies:    Average Meal Intake: 1-25%, 26-50%  Average Supplements Intake: None Ordered  [unfilled]    Anthropometric Measures:  Height: 177.8 cm (5' 10\")  Ideal Body Weight (IBW): 166 lbs (75 kg)    Admission Body Weight: 111.1 kg (245 lb) (stated)  Current Body Weight:  (UTD - up in chair, * edema present),   IBW.  > 100% per visual assessment  Current BMI (kg/m2):  UTD  Usual Body Weight: 115.7 kg  (255 lb) (( 9/2023), 254# ( 11/2023), 262# ( 5/2024))     Weight Adjustment For: No Adjustment                 BMI Categories: Obese Class 2 (BMI 35.0 -39.9) (based on stated wt)    Estimated Daily Nutrient Needs:  Energy Requirements Based On: Kcal/kg  Weight Used for Energy Requirements: Admission  Energy (kcal/day): 4745-7984 kcals @ 15-17 kcal/kg  Weight Used for Protein Requirements: Ideal  Protein (g/day): 90 g protein @ 1.2 g/kg  Method Used for Fluid Requirements: 1 ml/kcal  Fluid (ml/day): ~1800    Nutrition Diagnosis:   Inadequate oral intake related to early satiety, pain as evidenced by intake 26-50%, poor intake prior to admission    Nutrition Interventions:   Food and/or Nutrient Delivery: Continue Current Diet, Start Oral Nutrition Supplement  Nutrition Education/Counseling: Education not indicated  Coordination of Nutrition Care: Continue to monitor while inpatient       Goals:     Goals: PO intake 75% or greater, by next RD assessment, other (specify)  Specify Other Goals: gluc < 160    Nutrition Monitoring and Evaluation:   Behavioral-Environmental Outcomes: None Identified  Food/Nutrient Intake Outcomes: Food and Nutrient Intake, Diet Advancement/Tolerance, Supplement Intake  Physical Signs/Symptoms Outcomes: Biochemical Data, Meal Time Behavior, Weight    Discharge Planning:    Too soon to determine     JOAO VERMA, RD, LD

## 2024-08-29 ENCOUNTER — APPOINTMENT (OUTPATIENT)
Dept: GENERAL RADIOLOGY | Age: 69
End: 2024-08-29
Payer: MEDICARE

## 2024-08-29 VITALS
HEIGHT: 70 IN | TEMPERATURE: 96.9 F | SYSTOLIC BLOOD PRESSURE: 139 MMHG | DIASTOLIC BLOOD PRESSURE: 57 MMHG | WEIGHT: 245 LBS | OXYGEN SATURATION: 92 % | RESPIRATION RATE: 27 BRPM | HEART RATE: 95 BPM | BODY MASS INDEX: 35.07 KG/M2

## 2024-08-29 PROBLEM — Z71.89 ENCOUNTER FOR HOSPICE CARE DISCUSSION: Status: ACTIVE | Noted: 2024-08-29

## 2024-08-29 PROBLEM — C34.90 NON-SMALL CELL LUNG CANCER WITH METASTASIS (HCC): Status: ACTIVE | Noted: 2024-08-29

## 2024-08-29 LAB
ALBUMIN SERPL-MCNC: 3.4 G/DL (ref 3.5–4.6)
ALP SERPL-CCNC: 159 U/L (ref 35–104)
ALT SERPL-CCNC: 27 U/L (ref 0–41)
ANION GAP SERPL CALCULATED.3IONS-SCNC: 16 MEQ/L (ref 9–15)
ANISOCYTOSIS BLD QL SMEAR: ABNORMAL
AST SERPL-CCNC: 31 U/L (ref 0–40)
BASE EXCESS ARTERIAL: -7 (ref -3–3)
BASOPHILS # BLD: 0 K/UL (ref 0–0.2)
BASOPHILS NFR BLD: 0.6 %
BILIRUB SERPL-MCNC: 0.5 MG/DL (ref 0.2–0.7)
BUN SERPL-MCNC: 84 MG/DL (ref 8–23)
BURR CELLS: ABNORMAL
CALCIUM IONIZED: 1.3 MMOL/L (ref 1.12–1.32)
CALCIUM SERPL-MCNC: 9.4 MG/DL (ref 8.5–9.9)
CHLORIDE SERPL-SCNC: 101 MEQ/L (ref 95–107)
CO2 SERPL-SCNC: 19 MEQ/L (ref 20–31)
CREAT SERPL-MCNC: 1.97 MG/DL (ref 0.7–1.2)
CULTURE, BLOOD ID SENSITIVITY: ABNORMAL
CULTURE, BLOOD ID SENSITIVITY: ABNORMAL
EOSINOPHIL # BLD: 2.9 K/UL (ref 0–0.7)
EOSINOPHIL NFR BLD: 5 %
ERYTHROCYTE [DISTWIDTH] IN BLOOD BY AUTOMATED COUNT: 16.4 % (ref 11.5–14.5)
GLOBULIN SER CALC-MCNC: 2.1 G/DL (ref 2.3–3.5)
GLUCOSE BLD-MCNC: 194 MG/DL (ref 70–99)
GLUCOSE BLD-MCNC: 214 MG/DL (ref 70–99)
GLUCOSE SERPL-MCNC: 219 MG/DL (ref 70–99)
HCO3 ARTERIAL: 17.7 MMOL/L (ref 21–29)
HCT VFR BLD AUTO: 38.8 % (ref 42–52)
HCT VFR BLD AUTO: 40 % (ref 41–53)
HGB BLD CALC-MCNC: 13.6 GM/DL (ref 13.5–17.5)
HGB BLD-MCNC: 12.2 G/DL (ref 14–18)
HYPOCHROMIA BLD QL SMEAR: ABNORMAL
LACTATE: 1.57 MMOL/L (ref 0.4–2)
LYMPHOCYTES # BLD: 0 K/UL (ref 1–4.8)
LYMPHOCYTES NFR BLD: 5.7 %
MCH RBC QN AUTO: 25.9 PG (ref 27–31.3)
MCHC RBC AUTO-ENTMCNC: 31.4 % (ref 33–37)
MCV RBC AUTO: 82.4 FL (ref 79–92.2)
MONOCYTES # BLD: 0.6 K/UL (ref 0.2–0.8)
MONOCYTES NFR BLD: 1 %
MRSA, DNA, NASAL: NEGATIVE
MYELOCYTES NFR BLD MANUAL: 1 %
NEUTROPHILS # BLD: 54.6 K/UL (ref 1.4–6.5)
NEUTS BAND NFR BLD MANUAL: 2 %
NEUTS SEG NFR BLD: 91 %
O2 SAT, ARTERIAL: 97 % (ref 93–100)
ORGANISM: ABNORMAL
PCO2 ARTERIAL: 30 MM HG (ref 35–45)
PERFORMED ON: ABNORMAL
PERFORMED ON: ABNORMAL
PH ARTERIAL: 7.38 (ref 7.35–7.45)
PLATELET # BLD AUTO: 139 K/UL (ref 130–400)
PLATELET BLD QL SMEAR: ABNORMAL
PO2 ARTERIAL: 86 MM HG (ref 75–108)
POC CHLORIDE: 106 MEQ/L (ref 99–110)
POC CREATININE: 2 MG/DL (ref 0.8–1.3)
POC FIO2: 6
POC SAMPLE TYPE: ABNORMAL
POIKILOCYTOSIS BLD QL SMEAR: ABNORMAL
POTASSIUM SERPL-SCNC: 5.9 MEQ/L (ref 3.5–5.1)
POTASSIUM SERPL-SCNC: 6.2 MEQ/L (ref 3.4–4.9)
PROT SERPL-MCNC: 5.5 G/DL (ref 6.3–8)
RBC # BLD AUTO: 4.71 M/UL (ref 4.7–6.1)
SODIUM BLD-SCNC: 135 MEQ/L (ref 136–145)
SODIUM SERPL-SCNC: 136 MEQ/L (ref 135–144)
SPECIMEN DESCRIPTION: NORMAL
TCO2 ARTERIAL: 19 MMOL/L (ref 21–32)
WBC # BLD AUTO: 58.1 K/UL (ref 4.8–10.8)

## 2024-08-29 PROCEDURE — 6370000000 HC RX 637 (ALT 250 FOR IP): Performed by: INTERNAL MEDICINE

## 2024-08-29 PROCEDURE — 2580000003 HC RX 258: Performed by: INTERNAL MEDICINE

## 2024-08-29 PROCEDURE — 6360000002 HC RX W HCPCS

## 2024-08-29 PROCEDURE — 6360000002 HC RX W HCPCS: Performed by: INTERNAL MEDICINE

## 2024-08-29 PROCEDURE — 51798 US URINE CAPACITY MEASURE: CPT

## 2024-08-29 PROCEDURE — 82565 ASSAY OF CREATININE: CPT

## 2024-08-29 PROCEDURE — 84132 ASSAY OF SERUM POTASSIUM: CPT

## 2024-08-29 PROCEDURE — 82803 BLOOD GASES ANY COMBINATION: CPT

## 2024-08-29 PROCEDURE — 36415 COLL VENOUS BLD VENIPUNCTURE: CPT

## 2024-08-29 PROCEDURE — 99291 CRITICAL CARE FIRST HOUR: CPT | Performed by: INTERNAL MEDICINE

## 2024-08-29 PROCEDURE — 51702 INSERT TEMP BLADDER CATH: CPT

## 2024-08-29 PROCEDURE — 82948 REAGENT STRIP/BLOOD GLUCOSE: CPT

## 2024-08-29 PROCEDURE — 2700000000 HC OXYGEN THERAPY PER DAY

## 2024-08-29 PROCEDURE — 83605 ASSAY OF LACTIC ACID: CPT

## 2024-08-29 PROCEDURE — 99233 SBSQ HOSP IP/OBS HIGH 50: CPT

## 2024-08-29 PROCEDURE — 36600 WITHDRAWAL OF ARTERIAL BLOOD: CPT

## 2024-08-29 PROCEDURE — 84295 ASSAY OF SERUM SODIUM: CPT

## 2024-08-29 PROCEDURE — 82330 ASSAY OF CALCIUM: CPT

## 2024-08-29 PROCEDURE — 82435 ASSAY OF BLOOD CHLORIDE: CPT

## 2024-08-29 PROCEDURE — 80053 COMPREHEN METABOLIC PANEL: CPT

## 2024-08-29 PROCEDURE — 71045 X-RAY EXAM CHEST 1 VIEW: CPT

## 2024-08-29 PROCEDURE — 85025 COMPLETE CBC W/AUTO DIFF WBC: CPT

## 2024-08-29 PROCEDURE — 85014 HEMATOCRIT: CPT

## 2024-08-29 RX ORDER — METOPROLOL SUCCINATE 100 MG/1
100 TABLET, EXTENDED RELEASE ORAL DAILY
OUTPATIENT
Start: 2024-08-30

## 2024-08-29 RX ORDER — SODIUM CHLORIDE 0.9 % (FLUSH) 0.9 %
5-40 SYRINGE (ML) INJECTION PRN
OUTPATIENT
Start: 2024-08-29

## 2024-08-29 RX ORDER — MORPHINE SULFATE 2 MG/ML
2 INJECTION, SOLUTION INTRAMUSCULAR; INTRAVENOUS
OUTPATIENT
Start: 2024-08-29

## 2024-08-29 RX ORDER — SODIUM CHLORIDE 0.9 % (FLUSH) 0.9 %
5-40 SYRINGE (ML) INJECTION EVERY 12 HOURS SCHEDULED
OUTPATIENT
Start: 2024-08-29

## 2024-08-29 RX ORDER — SODIUM CHLORIDE 9 MG/ML
INJECTION, SOLUTION INTRAVENOUS PRN
OUTPATIENT
Start: 2024-08-29

## 2024-08-29 RX ORDER — LEVOTHYROXINE SODIUM 150 UG/1
150 TABLET ORAL DAILY
OUTPATIENT
Start: 2024-08-30

## 2024-08-29 RX ORDER — ALBUTEROL SULFATE 0.83 MG/ML
2.5 SOLUTION RESPIRATORY (INHALATION) EVERY 4 HOURS PRN
OUTPATIENT
Start: 2024-08-29

## 2024-08-29 RX ORDER — TRAMADOL HYDROCHLORIDE 50 MG/1
50 TABLET ORAL EVERY 6 HOURS PRN
OUTPATIENT
Start: 2024-08-29

## 2024-08-29 RX ORDER — HALOPERIDOL 5 MG/ML
5 INJECTION INTRAMUSCULAR ONCE
Status: COMPLETED | OUTPATIENT
Start: 2024-08-29 | End: 2024-08-29

## 2024-08-29 RX ORDER — MORPHINE SULFATE 2 MG/ML
2 INJECTION, SOLUTION INTRAMUSCULAR; INTRAVENOUS
Status: DISCONTINUED | OUTPATIENT
Start: 2024-08-29 | End: 2024-08-29 | Stop reason: HOSPADM

## 2024-08-29 RX ORDER — ACETAMINOPHEN 650 MG/1
650 SUPPOSITORY RECTAL EVERY 6 HOURS PRN
OUTPATIENT
Start: 2024-08-29

## 2024-08-29 RX ORDER — HYDRALAZINE HYDROCHLORIDE 20 MG/ML
10 INJECTION INTRAMUSCULAR; INTRAVENOUS EVERY 6 HOURS PRN
OUTPATIENT
Start: 2024-08-29

## 2024-08-29 RX ORDER — ONDANSETRON 2 MG/ML
4 INJECTION INTRAMUSCULAR; INTRAVENOUS EVERY 6 HOURS PRN
OUTPATIENT
Start: 2024-08-29

## 2024-08-29 RX ORDER — OXYCODONE AND ACETAMINOPHEN 5; 325 MG/1; MG/1
1 TABLET ORAL EVERY 6 HOURS PRN
OUTPATIENT
Start: 2024-08-29

## 2024-08-29 RX ORDER — ACETAMINOPHEN 325 MG/1
650 TABLET ORAL EVERY 6 HOURS PRN
OUTPATIENT
Start: 2024-08-29

## 2024-08-29 RX ORDER — MORPHINE SULFATE 2 MG/ML
2 INJECTION, SOLUTION INTRAMUSCULAR; INTRAVENOUS ONCE
Status: COMPLETED | OUTPATIENT
Start: 2024-08-29 | End: 2024-08-29

## 2024-08-29 RX ORDER — ENOXAPARIN SODIUM 100 MG/ML
30 INJECTION SUBCUTANEOUS 2 TIMES DAILY
OUTPATIENT
Start: 2024-08-29

## 2024-08-29 RX ORDER — PANTOPRAZOLE SODIUM 40 MG/1
40 TABLET, DELAYED RELEASE ORAL DAILY
OUTPATIENT
Start: 2024-08-30

## 2024-08-29 RX ORDER — GLUCAGON 1 MG/ML
1 KIT INJECTION PRN
OUTPATIENT
Start: 2024-08-29

## 2024-08-29 RX ORDER — DEXTROSE MONOHYDRATE 100 MG/ML
INJECTION, SOLUTION INTRAVENOUS CONTINUOUS PRN
OUTPATIENT
Start: 2024-08-29

## 2024-08-29 RX ORDER — INSULIN LISPRO 100 [IU]/ML
0-4 INJECTION, SOLUTION INTRAVENOUS; SUBCUTANEOUS
OUTPATIENT
Start: 2024-08-29

## 2024-08-29 RX ORDER — PREDNISONE 10 MG/1
10 TABLET ORAL DAILY
OUTPATIENT
Start: 2024-08-30

## 2024-08-29 RX ORDER — GUAIFENESIN/DEXTROMETHORPHAN 100-10MG/5
5 SYRUP ORAL EVERY 4 HOURS PRN
OUTPATIENT
Start: 2024-08-29

## 2024-08-29 RX ORDER — ONDANSETRON 4 MG/1
4 TABLET, ORALLY DISINTEGRATING ORAL EVERY 8 HOURS PRN
OUTPATIENT
Start: 2024-08-29

## 2024-08-29 RX ORDER — SENNOSIDES A AND B 8.6 MG/1
1 TABLET, FILM COATED ORAL 2 TIMES DAILY
OUTPATIENT
Start: 2024-08-29

## 2024-08-29 RX ORDER — GUAIFENESIN 600 MG/1
600 TABLET, EXTENDED RELEASE ORAL 2 TIMES DAILY
OUTPATIENT
Start: 2024-08-29

## 2024-08-29 RX ORDER — HYDRALAZINE HYDROCHLORIDE 20 MG/ML
10 INJECTION INTRAMUSCULAR; INTRAVENOUS EVERY 6 HOURS PRN
Status: DISCONTINUED | OUTPATIENT
Start: 2024-08-29 | End: 2024-08-29 | Stop reason: HOSPADM

## 2024-08-29 RX ORDER — MORPHINE SULFATE 2 MG/ML
2 INJECTION, SOLUTION INTRAMUSCULAR; INTRAVENOUS
Status: DISCONTINUED | OUTPATIENT
Start: 2024-08-29 | End: 2024-08-29

## 2024-08-29 RX ORDER — INSULIN GLARGINE 100 [IU]/ML
0.15 INJECTION, SOLUTION SUBCUTANEOUS NIGHTLY
OUTPATIENT
Start: 2024-08-29

## 2024-08-29 RX ORDER — FUROSEMIDE 10 MG/ML
40 INJECTION INTRAMUSCULAR; INTRAVENOUS ONCE
Status: COMPLETED | OUTPATIENT
Start: 2024-08-29 | End: 2024-08-29

## 2024-08-29 RX ORDER — INSULIN LISPRO 100 [IU]/ML
0-4 INJECTION, SOLUTION INTRAVENOUS; SUBCUTANEOUS NIGHTLY
OUTPATIENT
Start: 2024-08-29

## 2024-08-29 RX ORDER — MAGNESIUM SULFATE IN WATER 40 MG/ML
2000 INJECTION, SOLUTION INTRAVENOUS PRN
OUTPATIENT
Start: 2024-08-29

## 2024-08-29 RX ORDER — TAMSULOSIN HYDROCHLORIDE 0.4 MG/1
0.8 CAPSULE ORAL DAILY
OUTPATIENT
Start: 2024-08-30

## 2024-08-29 RX ADMIN — MORPHINE SULFATE 2 MG: 2 INJECTION, SOLUTION INTRAMUSCULAR; INTRAVENOUS at 21:09

## 2024-08-29 RX ADMIN — MORPHINE SULFATE 2 MG: 2 INJECTION, SOLUTION INTRAMUSCULAR; INTRAVENOUS at 02:03

## 2024-08-29 RX ADMIN — CEFEPIME 2000 MG: 2 INJECTION, POWDER, FOR SOLUTION INTRAVENOUS at 12:57

## 2024-08-29 RX ADMIN — HALOPERIDOL LACTATE 5 MG: 5 INJECTION, SOLUTION INTRAMUSCULAR at 00:39

## 2024-08-29 RX ADMIN — LEVOTHYROXINE SODIUM 150 MCG: 150 TABLET ORAL at 06:23

## 2024-08-29 RX ADMIN — HYDRALAZINE HYDROCHLORIDE 10 MG: 20 INJECTION INTRAMUSCULAR; INTRAVENOUS at 04:17

## 2024-08-29 RX ADMIN — CALCIUM GLUCONATE 1000 MG: 98 INJECTION, SOLUTION INTRAVENOUS at 12:05

## 2024-08-29 RX ADMIN — MORPHINE SULFATE 2 MG: 2 INJECTION, SOLUTION INTRAMUSCULAR; INTRAVENOUS at 04:59

## 2024-08-29 RX ADMIN — INSULIN HUMAN 10 UNITS: 100 INJECTION, SOLUTION PARENTERAL at 12:02

## 2024-08-29 RX ADMIN — MORPHINE SULFATE 2 MG: 2 INJECTION, SOLUTION INTRAMUSCULAR; INTRAVENOUS at 09:35

## 2024-08-29 RX ADMIN — DEXTROSE MONOHYDRATE 250 ML: 100 INJECTION, SOLUTION INTRAVENOUS at 12:02

## 2024-08-29 RX ADMIN — MORPHINE SULFATE 2 MG: 2 INJECTION, SOLUTION INTRAMUSCULAR; INTRAVENOUS at 07:15

## 2024-08-29 RX ADMIN — MORPHINE SULFATE 2 MG: 2 INJECTION, SOLUTION INTRAMUSCULAR; INTRAVENOUS at 17:23

## 2024-08-29 RX ADMIN — FUROSEMIDE 40 MG: 10 INJECTION, SOLUTION INTRAMUSCULAR; INTRAVENOUS at 09:35

## 2024-08-29 RX ADMIN — MORPHINE SULFATE 2 MG: 2 INJECTION, SOLUTION INTRAMUSCULAR; INTRAVENOUS at 14:55

## 2024-08-29 ASSESSMENT — ENCOUNTER SYMPTOMS
COUGH: 1
VOICE CHANGE: 0
BACK PAIN: 1
SHORTNESS OF BREATH: 1
NAUSEA: 0
VOMITING: 0
TROUBLE SWALLOWING: 0

## 2024-08-29 ASSESSMENT — PAIN DESCRIPTION - LOCATION
LOCATION: GENERALIZED
LOCATION: GENERALIZED

## 2024-08-29 ASSESSMENT — PAIN DESCRIPTION - PAIN TYPE: TYPE: OTHER (COMMENT);ACUTE PAIN

## 2024-08-29 ASSESSMENT — PAIN DESCRIPTION - DESCRIPTORS
DESCRIPTORS: DISCOMFORT
DESCRIPTORS: PATIENT UNABLE TO DESCRIBE

## 2024-08-29 ASSESSMENT — PAIN - FUNCTIONAL ASSESSMENT: PAIN_FUNCTIONAL_ASSESSMENT: PREVENTS OR INTERFERES WITH ALL ACTIVE AND SOME PASSIVE ACTIVITIES

## 2024-08-29 ASSESSMENT — PAIN DESCRIPTION - ONSET: ONSET: ON-GOING

## 2024-08-29 ASSESSMENT — PAIN DESCRIPTION - FREQUENCY: FREQUENCY: OTHER (COMMENT)

## 2024-08-29 NOTE — PROGRESS NOTES
6990-6070: Bedside report obtained from María RN, after which this RN assumed care of pt. During report, pt lethargic with RR 30s, unable to answer orientation questions without SOB. Dr Salvador notified via [employer] and then called this RN via telephone to discuss pt, ABGs ordered.    5588-0609: Shift assessments completed and documented, see flowsheets. A&OX1, confused, family at bedside. Dr Hu called via telephone to confirm if lovenox okay to give tonight r/t bronchoscopy planned for tomorrow, stated to give it. Medications administered per MAR. Bed alarm on. Call light within reach. No further needs verbalized by pt or family at this time.    4258-3132: Pt restless, attempting to move out of bed, yelling, stating he cannot breathe despite family at bedside attempting to calm and redirect. Dr Salvador spoken to face-to-face, orders fr benadryl acknowledged, see MAR for admin.    0910-8733: Pt still restless, yelling out, confused, attempting to climb out of bed-- Dr Salvador notified again, haldol order acknowledged, given per MAR.    3230-8544: Pt agitated, yelling, climbing out of bed, taken oxygen off, stating \"I can't breathe! Get me up I can't breathe!\" despite redirection attempts by staff and pt brother. Jessy Oneal NP at bedside, morphine order acknowledged, see MAR for admin. Dr Salvador spoken to via telephone r/t pt again, no new orders.    4873-6289: Pt agitated again, Jm BONILLA and Dr Salvador both discussed pt with this RN via telephone and face-to-face. Orders for morphine acknowledged, see MAR. Pt began to state \"I want to be let go\" in front of this RN and pt brother, Live. Dr Salvador informed of potential change in CODE status needed, and pt brother and Dr Salvador discussing pt case at this time.    0612: Dr Salvador notified via [employer] of pt critical K, BUN and WBC.

## 2024-08-29 NOTE — PROGRESS NOTES
Wound ostomy consult was placed for patient, up to floor with Wound NP at this time in attempt to assess patient. Unit RN, Meredith, deferred assessment stating patient goals of care have shifted to comfort care. RN to let this wound ostomy RN know of any changes in plan of care or further needs. Will sign off at this time, please reconsult if needed.    FRANCY ChoN, RN, Wound/Ostomy Nurse on 8/29/2024 at 10:59 AM

## 2024-08-29 NOTE — INTERDISCIPLINARY ROUNDS
Spiritual Care Services     Summary of Visit:   participated in ICU rounds. Patient confused this morning. Palliative involved in patient's care and patient and family considering possible hospice consult. ACP consult has been placed but  will defer ACP conversation due to patient's currently confused state.    Encounter Summary  Encounter Overview/Reason: Interdisciplinary rounds, Palliative Care, Advance Care Planning  Service Provided For: Patient  Referral/Consult From: Clergy/  Support System: Family members  Last Encounter : 08/28/24  Complexity of Encounter: Moderate  Begin Time: 2155  End Time : 2200  Total Time Calculated: 5 min     Crisis  Type: Emotional distress  Spiritual/Emotional needs  Type: Spiritual Support, Spiritual Distress  Rituals, Rites and Sacraments  Type: Blessings  Grief, Loss, and Adjustments  Type: End of Life, Anticipatory Grief  Ethics/Mediation  Type: Other (comment) (Prayer/Spiritual )     Palliative Care  Type: Palliative Care, Interdisciplinary Rounds  Advance Care Planning  Type:  (Deferred ACP conversation)    Spiritual Assessment/Intervention/Outcomes:    Assessment: Fearful    Intervention: Prayer (assurance of)/[place]    Outcome: Comfort, Deescalated, Encouraged      Care Plan:    Plan and Referrals  Plan/Referrals: No future visits requested  Does the patient have a [facility] PCP?:  (no idea)     to provide additional spiritual or ACP support as needed or requesed      Spiritual Care Services   Electronically signed by Chaplain Young on 8/29/2024 at 11:09 AM.    To reach a  for emotional and spiritual support, place an EPIC consult request.   If a  is needed immediately, dial “0” and ask to page the on-call .

## 2024-08-29 NOTE — CONSULTS
[facility]           Radiation Oncology      [address] [place] [address]        O: [phone]        F: [phone]       [facility]                   Dr. Raúl Pavon MD PhD    CONSULT NOTE     Date of Service: 2024  Patient ID: Gerardo Esquivel   : 1955  MRN: [id number]   Acct Number: [id number]       Gerardo Esquivel  69 y.o.   1955    REFERRING PROVIDER: No ref. provider found    PCP:  Sandra Barrera MD    DIAGNOSIS:  1. General weakness    2. Leukocytosis, unspecified type    3. Fall, initial encounter    4. Pneumonia of left lower lobe due to infectious organism    5. KERRIE (acute kidney injury) (HCC)    6. Hx of cancer of lung        STAGING: Cancer Staging   Malignant neoplasm of lower lobe of left lung (HCC)  Staging form: Lung, AJCC 8th Edition  - Clinical: Stage IVB (cT3, cN2, cM1c) - Signed by Raúl Pavon MD on 2024      HISTORY OF PRESENT ILLNESS: Mr. Gerardo Esquivel  is a 69 y.o. year old male  This is a 69-year-old gentleman, former smoker, with history of COPD, CAD, CKD, hypertension, hyperlipidemia, hypothyroidism, type 2 diabetes, obstructive sleep apnea, and clinical stage IV non-small cell lung cancer emanating from the left lower lobe, adenocarcinoma histology, with recent worsening of respiratory function with hemoptysis requiring hospitalization.  He presents to discuss the role and rationale of palliative radiation therapy to achieve some local regional control as a bridge to initiating systemic therapy.    His oncologic history dates back to earlier this summer when on 2024 CT lung screen revealed multiple unchanged pleural-based soft tissue densities with associated calcifications.  An addendum to the CT lung screen month 2024 revealed a left perihilar mass measuring 2.6 x 2.4 cm.  Repeat CT angiogram on 2024 revealed a left lower lobe mass measuring 3.2 x 4.1 cm encasing the main bronchus to the  Planning to understand personal values, life goals, and preferences regarding future medical care with respect to potential modifications/establishing of advanced directives and any modifications of current goals of care.      A combined total of 60 minutes was spent in preparing this consultation as well as in meeting with the patient in person.  Please excuse any typos in this consultation note as voice dictation was used.    ORDERS: Referral back to pulmonology for consideration of EBUS while in the hospital    FOLLOW-UP: Return to the cancer center for CT simulation if patient can tolerate    Raúl Pavon MD PHD  Radiation Oncologist, [facility]  Medical Director    Department of Radiation Oncology  [facility]  [address] [place]  [address]

## 2024-08-29 NOTE — CONSULTS
Referral visit completed with pt and pt bothers George and sister Ana at bedside. Pt is lethargic and not able to particulate in meeting. Hospice Philosophy, & Hospice Patient Information Booklet reviewed. All 3 siblings in agreement with hospice philosophy, consents signed at this time by Live Perez and Yves. Appropriateness obtained from Dr. Calderón at 1500 with diagnosis given and meds reviewed and reconciled. Pt to be inpatient level of care at [facility] for respiratory management. Spoke with bedside nurse Meredith and updated on outcome of meeting, Meredith to obtain D/C orders for center. Call to physicians ambulance and transport arranged for 1800 this evening.

## 2024-08-29 NOTE — CARE COORDINATION
This LSW completed rounds with ICU team.  Patient had family at bedside.  Palliative Care is following, patient has had decline and comfort care is focus now.  ANNETTEW/QUINTON to follow  Electronically signed by TIFFANIE Mueller on 8/29/24 at 11:06 AM EDT

## 2024-08-29 NOTE — CONSULTS
Pulmonary and Critical Care Medicine  Consult Note  Encounter Date: 2024 8:26 AM    Mr. Gerardo Esquivel is a 69 y.o. male  : 1955  Requesting Provider: Tone Ramsey MD    Reason for request: Acute respiratory failure            HISTORY OF PRESENT ILLNESS:    Patient is 69 y.o. presents with shortness of breath, he was transferred yesterday to ICU from floor for intention of bronchoscopy today for airway clearance, he was supposed to start urgent radiation therapy for known underlying adenocarcinoma however could not tolerate laying flat while at [employer], patient currently encephalopathic, he mumbles answers, he was admitted with worsening hemoptysis, no fever, urine output 600 cc.  No reported vomiting or aspiration event.  He had bronchoscopy done in July and EBUS, biopsy showed adenocarcinoma          Past Medical History:        Diagnosis Date    CAD (coronary artery disease)     Chronic cough     CKD (chronic kidney disease), stage III (HCC)     COPD (chronic obstructive pulmonary disease) (HCC)     Depression     H/O pneumothorax     x 2    Hodgkin disease (HCC)     Hyperlipidemia     Hypertension     Hypothyroid     Lung mass     MRSA infection 2014    Left Groin     Pulmonary embolism (HCC)     Sleep apnea     Thyroid cancer (HCC)     Type II or unspecified type diabetes mellitus without mention of complication, not stated as uncontrolled        Past Surgical History:        Procedure Laterality Date    ABDOMEN SURGERY      hodgkin     BRONCHOSCOPY N/A 2024    EBUS WITH BIOPSYS performed by Cassidy Hu MD at [facility]    CATARACT REMOVAL Bilateral     CHOLECYSTECTOMY      COLONOSCOPY      COLONOSCOPY N/A 2016    COLONOSCOPY performed by Saundra Blount MD at [facility]    COLONOSCOPY N/A 2021    COLORECTAL CANCER SCREENING, HIGH RISK WITH POLYPECTOMY performed by Saundra Blount MD at [facility]    ENDOSCOPY, COLON, DIAGNOSTIC      HERNIA REPAIR       umbilical    SKIN BIOPSY      SPLENECTOMY, TOTAL      THORACOSCOPY W/ TALC PLEURODESIS Right     x 2    TOOTH EXTRACTION      TOTAL THYROIDECTOMY      CANCER       Social History:     reports that he quit smoking about 16 years ago. His smoking use included cigarettes. He started smoking about 51 years ago. He has a 70 pack-year smoking history. He has never used smokeless tobacco. He reports that he does not drink alcohol and does not use drugs.    Family History:       Problem Relation Age of Onset    Stroke Mother     Heart Disease Father     Colon Cancer Neg Hx        Allergies:  Doxycycline, Enoxaparin, Fenofibrate, Metformin and related, and Nateglinide        MEDICATIONS during current hospitalization:    Continuous Infusions:   sodium chloride      dextrose         Scheduled Meds:   vancomycin  1,250 mg IntraVENous Q24H    senna  1 tablet Oral BID    polyethylene glycol  17 g Oral Once    tamsulosin  0.4 mg Oral Once    tamsulosin  0.8 mg Oral Daily    methylPREDNISolone  40 mg IntraVENous Daily    sodium chloride flush  5-40 mL IntraVENous 2 times per day    levothyroxine  150 mcg Oral Daily    metoprolol succinate  100 mg Oral Daily    pantoprazole  40 mg Oral Daily    [Held by provider] predniSONE  10 mg Oral Daily    enoxaparin  30 mg SubCUTAneous BID    guaiFENesin  600 mg Oral BID    cefepime  2,000 mg IntraVENous Q12H    insulin glargine  0.15 Units/kg SubCUTAneous Nightly    insulin lispro  0-4 Units SubCUTAneous TID WC    insulin lispro  0-4 Units SubCUTAneous Nightly    vancomycin (VANCOCIN) intermittent dosing (placeholder)   Other RX Placeholder       PRN Meds:hydrALAZINE, morphine, oxyCODONE-acetaminophen, sodium chloride flush, sodium chloride, traMADol, acetaminophen **OR** acetaminophen, ondansetron **OR** ondansetron, potassium chloride **OR** potassium alternative oral replacement **OR** potassium chloride, magnesium sulfate, guaiFENesin-dextromethorphan, glucose, dextrose bolus **OR**  dextrose bolus, glucagon (rDNA), dextrose, albuterol, ipratropium        REVIEW OF SYSTEMS: Not obtainable, patient encephalopathic    PHYSICAL EXAM:    Vitals:  BP (!) 142/73   Pulse 96   Temp 96.9 °F (36.1 °C) (Axillary)   Resp 27   Ht 1.778 m (5' 10\")   Wt 111.1 kg (245 lb)   SpO2 98%   BMI 35.15 kg/m²     General: Encephalopathic, laying in bed, tachypnea  Head: normocephalic, atraumatic  Eyes:No gross abnormalities.  ENT:  MMM no lesions  Neck:  supple and no masses  Chest : Diminished air movement, bilateral rales, no wheezing  Heart:: Heart sounds are normal.  Regular rate and rhythm without murmur, gallop or rub.  ABD:  symmetric, soft, non-tender  Musculoskeletal : no cyanosis, no clubbing, and 2+ edema  Neuro:   Encephalopathic open his eyes occasionally, did not follow command  Skin: No rashes or nodules noted.  Lymph node:  no cervical nodes  Urology: Yes Bray   Psychiatric: Calm        Data Review  Recent Labs     08/27/24  1621 08/28/24  0608 08/28/24  0641 08/28/24  1944 08/29/24  0449 08/29/24  0456   WBC 51.7*  --  45.3*  --  58.1*  --    HGB 12.4*   < > 11.2* 13.3* 12.2* 13.6   HCT 40.3*  --  35.4*  --  38.8*  --      --  127*  --  139  --     < > = values in this interval not displayed.      Recent Labs     08/27/24  1621 08/28/24  0608 08/28/24  0641 08/28/24  1944 08/29/24  0449 08/29/24  0456   *  --  134*  --  136  --    K 5.6*  --  4.9  --  6.2*  --    CL 95  --  103  --  101  --    CO2 23  --  19*  --  19*  --    BUN 76*  --  72*  --  84*  --    CREATININE 1.89*   < > 1.63* 2.0* 1.97* 2.0*   GLUCOSE 134*  --  119*  --  219*  --     < > = values in this interval not displayed.       MV Settings:          ABGs:   Recent Labs     08/28/24  0608 08/28/24  1944 08/29/24  0456   PHART 7.334* 7.295* 7.384   HUF2NFI 38 45 30*   PO2ART 92 89 86   VYW9DIE 20.3* 21.8 17.7*   BEART -6* -5* -7*   M2BIGSPW 97 96 97   VGG3UMV 21 23 19*     O2 Device: Nasal cannula  O2 Flow Rate

## 2024-08-29 NOTE — FLOWSHEET NOTE
Shift summary    9329-9308 report at bedside. Pt alert to self only. Belly breathing- air hunger noted. Sats in upper 90s. Stated \"I can't pee\". Bladder scan done- 614mL.     Discussion with pt brother Live and girlfriend Krys- pt has no children. Had discussion with hospitalist overnight- see note- they would like to focus on comfort right now.     Morphine given for air hunger with relief- resp rate cut in half, pt less restless.     Morgan placed for comfort as goals of care/code status have shifted. Discussed with [facility]. Electronically signed by Meredith Wheeler RN on 8/29/2024 at 8:37 AM    0930 ICU rounds done see orders. I did speak with Tanisha DAY pall care.     Pt much less restless after morgan insertion. Family at bedside, updated.     1100 resting with eyes closed. Family at bedside, communion done.     1200 no further change to assessment. Resting comfortably in bed. A x 1. Denies pain at this time.     Plan for hospice meeting at 1400.     Electronically signed by Meredith Wheeler RN on 8/29/2024 at 2:52 PM    1455 medicated for SOB/air hunger.    1530 resting with eyes closed, appears more comfortable/less SOB.     1630 pt has declined any food/drink this shift even from family.     1723 medicated for SOB/air hunger. Awaiting transport for hospice. Electronically signed by Meredith Wheeler RN on 8/29/2024 at 5:32 PM

## 2024-08-29 NOTE — CARE COORDINATION
Patient with dyspnea despite no significant hypoxemia on supplemental oxygen overnight.  Significant anxiety and reported air hunger.  Requested medication early in the shift for sleep and anxiety.  Administered Benadryl initially for sleep assistance and anxiety with limited benefit.  Became disoriented and borderline agitated several hours later, requiring one-time 5 mg Haldol IM, again with only brief benefit.  Patient's brother can present at bedside, reports he has had intermittent hallucinations over the past several days and repeatedly alternating CODE STATUS wishes.  Later in the shift this morning, patient endorsing wish to \"stop everything and let me go\", but based on timing still likely under influence of aforementioned medications and thus not able to make informed medical decisions at this time.  This author had extended care discussion with patient's brother can in the conference room, after which can elected to transition patient from full code to DNR CCA, and intends to have other members of family who would wish to see the patient come to the hospital within the next day or so, as he anticipates they will likely be transitioning towards more comfort based measures.    Implementing morphine 2 mg IV every 2 hours as needed pain and air hunger.  Would recommend against subsequent use of Benadryl or Haldol given the lack of significant benefit when trialed overnight this shift, and is low-dose 2 mg morphine administered 2 times a few hours apart had notably more benefit, attributed primarily to decrease in air hunger.    Electronically signed by Mary Salvador DO on 8/29/2024 at 5:31 AM

## 2024-08-29 NOTE — DISCHARGE SUMMARY
Hospital Medicine Discharge Summary    Gerardo Esquivel  :  1955  MRN:  [id number]    Admit date:  2024  Discharge date:  2024    Admitting Physician:  Tnoe Ramsey MD  Primary Care Physician:  Sandra Barrera MD      Discharge Diagnoses:    Metastatic adenocarcinoma    Chief Complaint   Patient presents with    Fall    Extremity Weakness    Fatigue     Hospital Course:   Patient with metastatic adenocarcinoma presented with a post obstructive pneumonia.  Underwent urgent radiation therapy but unfortunately the disease is advanced with a poor prognosis .  Family opted for hospice care.       Exam on discharge:   /67   Pulse 85   Temp 97.5 °F (36.4 °C) (Oral)   Resp 18   Ht 1.778 m (5' 10\")   Wt 111.1 kg (245 lb)   SpO2 100%   BMI 35.15 kg/m²   General appearance: ill appearing.  HEENT:  Conjunctivae/corneas clear.  Neck:  Trachea midline.  Respiratory:  rhonchi throughout.  Cardiovascular: Regular rate and rhythm   Abdomen: Soft, non-tender, non-distended with normal bowel sounds.  Musculoskeletal: +2 edema  Neuro: Non Focal.   Patient was seen by the following consultants   Consults:  IP CONSULT TO ONCOLOGY  IP CONSULT TO PULMONOLOGY  IP CONSULT TO PHARMACY  IP CONSULT TO PALLIATIVE CARE  IP CONSULT TO SPIRITUAL SERVICES  IP CONSULT TO HOSPICE  IP CONSULT TO ONCOLOGY  IP CONSULT TO PHARMACY  IP CONSULT TO SPIRITUAL SERVICES  IP CONSULT TO HOSPICE    Significant Diagnostic Studies:    Refer to chart     Please refer to chart if no studies are shown here    IR PICC WO SQ PORT/PUMP > 5 YEARS    Result Date: 2024  EXAMINATION: IR PICC WO SQ PORT/PUMP > 5 YEARS DATE AND TIME:2024 3:58 PM CLINICAL HISTORY: Reason for exam:->antibiotics  antibiotics COMPARISON: None available. Radiation dose: 6.62 mGy. Procedure was monitored by Interventional Radiologist from outside the procedure room. The procedure was approved by the Interventional Radiologist. Interventional Radiologist was

## 2024-08-29 NOTE — PLAN OF CARE
Problem: Discharge Planning  Goal: Discharge to home or other facility with appropriate resources  Outcome: Progressing  Flowsheets (Taken 8/28/2024 0900 by Nereida Paul, RN)  Discharge to home or other facility with appropriate resources: Identify barriers to discharge with patient and caregiver     Problem: Pain  Goal: Verbalizes/displays adequate comfort level or baseline comfort level  Outcome: Progressing     Problem: Safety - Adult  Goal: Free from fall injury  Outcome: Progressing     Problem: Chronic Conditions and Co-morbidities  Goal: Patient's chronic conditions and co-morbidity symptoms are monitored and maintained or improved  Outcome: Progressing  Flowsheets (Taken 8/28/2024 0900 by Nereida Paul, RN)  Care Plan - Patient's Chronic Conditions and Co-Morbidity Symptoms are Monitored and Maintained or Improved: Monitor and assess patient's chronic conditions and comorbid symptoms for stability, deterioration, or improvement     Problem: Nutrition Deficit:  Goal: Optimize nutritional status  8/28/2024 2252 by Seb Harvey RN  Outcome: Progressing  8/28/2024 1431 by Elo Burgos RD, LD  Flowsheets (Taken 8/28/2024 1431)  Nutrient intake appropriate for improving, restoring, or maintaining nutritional needs:   Assess nutritional status and recommend course of action   Monitor oral intake, labs, and treatment plans   Recommend appropriate diets, oral nutritional supplements, and vitamin/mineral supplements     Problem: Skin/Tissue Integrity  Goal: Absence of new skin breakdown  Description: 1.  Monitor for areas of redness and/or skin breakdown  2.  Assess vascular access sites hourly  3.  Every 4-6 hours minimum:  Change oxygen saturation probe site  4.  Every 4-6 hours:  If on nasal continuous positive airway pressure, respiratory therapy assess nares and determine need for appliance change or resting period.  Outcome: Progressing

## 2024-08-29 NOTE — ONCOLOGY
Hematology/Oncology  Attending Progress Note        CHIEF COMPLAINT/HPI:  SOB and hypoxia better today.    REVIEW OF SYSTEMS:    Unremarkable except for symptoms mentioned in HPI.    Current Inpatient Medications:    Current Facility-Administered Medications: hydrALAZINE (APRESOLINE) injection 10 mg, 10 mg, IntraVENous, Q6H PRN  morphine (PF) injection 2 mg, 2 mg, IntraVENous, Q2H PRN  vancomycin (VANCOCIN) 1,250 mg in sodium chloride 0.9 % 250 mL IVPB (ADDAVIAL), 1,250 mg, IntraVENous, Q24H  oxyCODONE-acetaminophen (PERCOCET) 5-325 MG per tablet 1 tablet, 1 tablet, Oral, Q6H PRN  senna (SENOKOT) tablet 8.6 mg, 1 tablet, Oral, BID  polyethylene glycol (GLYCOLAX) packet 17 g, 17 g, Oral, Once  tamsulosin (FLOMAX) capsule 0.4 mg, 0.4 mg, Oral, Once  tamsulosin (FLOMAX) capsule 0.8 mg, 0.8 mg, Oral, Daily  methylPREDNISolone sodium succ (SOLU-MEDROL) 40 mg in bacteriostatic water 1 mL injection, 40 mg, IntraVENous, Daily  sodium chloride flush 0.9 % injection 5-40 mL, 5-40 mL, IntraVENous, 2 times per day  sodium chloride flush 0.9 % injection 5-40 mL, 5-40 mL, IntraVENous, PRN  0.9 % sodium chloride infusion, , IntraVENous, PRN  levothyroxine (SYNTHROID) tablet 150 mcg, 150 mcg, Oral, Daily  metoprolol succinate (TOPROL XL) extended release tablet 100 mg, 100 mg, Oral, Daily  pantoprazole (PROTONIX) tablet 40 mg, 40 mg, Oral, Daily  [Held by provider] predniSONE (DELTASONE) tablet 10 mg, 10 mg, Oral, Daily  traMADol (ULTRAM) tablet 50 mg, 50 mg, Oral, Q6H PRN  enoxaparin Sodium (LOVENOX) injection 30 mg, 30 mg, SubCUTAneous, BID  acetaminophen (TYLENOL) tablet 650 mg, 650 mg, Oral, Q6H PRN **OR** acetaminophen (TYLENOL) suppository 650 mg, 650 mg, Rectal, Q6H PRN  ondansetron (ZOFRAN-ODT) disintegrating tablet 4 mg, 4 mg, Oral, Q8H PRN **OR** ondansetron (ZOFRAN) injection 4 mg, 4 mg, IntraVENous, Q6H PRN  potassium chloride (KLOR-CON M) extended release tablet 40 mEq, 40 mEq, Oral, PRN **OR** potassium bicarb-citric  limits. Patchy left perihilar and left lower lobe airspace disease is noted.  There is a small left pleural effusion. The right lung is clear.  There is no pneumothorax.     1. Patchy left perihilar and left lower lobe airspace disease 2. Small left pleural effusion. 3. The right lung is clear.     XR CHEST PORTABLE    Result Date: 8/25/2024  EXAMINATION: ONE XRAY VIEW OF THE CHEST 8/25/2024 12:01 pm COMPARISON: None. HISTORY: ORDERING SYSTEM PROVIDED HISTORY: SOB, LUNG  MASS  COPD TECHNOLOGIST PROVIDED HISTORY: Reason for exam:->SOB Reason for exam:->LUNG  MASS  COPD What reading provider will be dictating this exam?->CRC FINDINGS: Upper abdominal surgical material.  Low lung volumes and chronic interstitial coarsening.  Bibasilar subsegmental atelectasis or scarring.  Nonspecific hazy opacity of the left lung base.  Atherosclerotic vascular calcifications. Cardiac silhouette borderline enlarged, accentuated by portable projection. Trace bilateral pleural effusions/thickening.     Chronic findings without appreciable acute process.     MRI BRAIN W WO CONTRAST    Result Date: 8/20/2024  EXAMINATION: MRI OF THE BRAIN WITHOUT AND WITH CONTRAST  8/18/2024 10:05 am TECHNIQUE: Multiplanar multisequence MRI of the head/brain was performed without and with the administration of intravenous contrast. COMPARISON: None. HISTORY: ORDERING SYSTEM PROVIDED HISTORY: Malignant neoplasm of upper lobe of right lung (HCC) TECHNOLOGIST PROVIDED HISTORY: STAT Creatinine as needed:->No What reading provider will be dictating this exam?->CRC FINDINGS: 20 mL MultiHance utilized There are no areas of restricted diffusion to suggest acute intracranial ischemia.  No intracranial hemorrhage or edema.  No hydrocephalus.  No abnormal extra-axial fluid collections.  No abnormal parenchymal enhancement or leptomeningeal enhancement.  No evidence of calvarial lesion.  The paranasal sinuses and mastoid air cells are clear.  Midline structures  including the pituitary gland, corpus callosum, and brainstem are unremarkable.     No evidence of intracranial metastases.     MRI BRAIN W WO CONTRAST    Result Date: 8/18/2024  EXAMINATION: MRI OF THE BRAIN WITHOUT AND WITH CONTRAST  8/18/2024 10:05 am TECHNIQUE: Multiplanar multisequence MRI of the head/brain was performed without and with the administration of intravenous contrast. COMPARISON: None. HISTORY: ORDERING SYSTEM PROVIDED HISTORY: Malignant neoplasm of upper lobe of right lung (HCC) TECHNOLOGIST PROVIDED HISTORY: STAT Creatinine as needed:->No What reading provider will be dictating this exam?->CRC FINDINGS: 20 mL MultiHance utilized There are no areas of restricted diffusion to suggest acute intracranial ischemia.  No intracranial hemorrhage or edema.  No hydrocephalus.  No abnormal extra-axial fluid collections.  No abnormal parenchymal enhancement or leptomeningeal enhancement.  No evidence of calvarial lesion.  The paranasal sinuses and mastoid air cells are clear.  Midline structures including the pituitary gland, corpus callosum, and brainstem are unremarkable. IMPRESSION: No evidence of intracranial metastases.   Interpreted by: Enrique Lyons DO Signed by: Enrique Lyons DO 8/20/24 Final result    PET/CT skull base to mid thigh    Result Date: 8/5/2024  HISTORY:   Initial staging left lung carcinoma. TECHNIQUE: 11.8mCi of FDG were injected with a fasting blood sugar of 189 mg/dl followed by dedicated PET whole body images 60minutes following left hand intravenous administration. Metabolic FDG images were coregistered with axial CT.   FINDINGS Neck/chest:  Hypermetabolic left perihilar (lower lobe) approximate 5.0 cm mass ( maximum SUV 6) with contiguous extension into the ipsilateral left hilum, similar size lymph node aggregate. Subcarinal hypermetabolic 1.0-2.0 cm lymph node aggregate right of midline. Multiple hypermetabolic pleural-based calcified and noncalcified nodules ( right to

## 2024-08-29 NOTE — DISCHARGE INSTR - COC
Continuity of Care Form    Patient Name: Gerardo Esquivel   :  1955  MRN:  [id number]    Admit date:  2024  Discharge date:  ***    Code Status Order: Limited   Advance Directives:   Advance Care Flowsheet Documentation             Admitting Physician:  Tone Ramsey MD  PCP: Sandra Barrera MD    Discharging Nurse: ***  Discharging Hospital Unit/Room#: IC06/IC06-01  Discharging Unit Phone Number: ***    Emergency Contact:   Extended Emergency Contact Information  Primary Emergency Contact: Dinah Esquivel  Mobile Phone: [phone]  Relation: Brother/Sister  Preferred language: English   needed? No  Secondary Emergency Contact: Live Esquivel   [facility]  Home Phone: [phone]  Work Phone: [phone]  Mobile Phone: [phone]  Relation: Brother/Sister    Past Surgical History:  Past Surgical History:   Procedure Laterality Date    ABDOMEN SURGERY      hodgkin     BRONCHOSCOPY N/A 2024    EBUS WITH BIOPSYS performed by Cassidy Hu MD at [facility]    CATARACT REMOVAL Bilateral     CHOLECYSTECTOMY      COLONOSCOPY      COLONOSCOPY N/A 2016    COLONOSCOPY performed by Saundra Blount MD at [facility]    COLONOSCOPY N/A 2021    COLORECTAL CANCER SCREENING, HIGH RISK WITH POLYPECTOMY performed by Saundra Blount MD at [facility]    ENDOSCOPY, COLON, DIAGNOSTIC      HERNIA REPAIR      umbilical    SKIN BIOPSY      SPLENECTOMY, TOTAL      THORACOSCOPY W/ TALC PLEURODESIS Right     x 2    TOOTH EXTRACTION      TOTAL THYROIDECTOMY      CANCER       Immunization History:   Immunization History   Administered Date(s) Administered    COVID-19, MODERNA, ( formula), (age 12y+), IM, 50mcg/0.5mL 2023    COVID-19, PFIZER PURPLE top, DILUTE for use, (age 12 y+), 30mcg/0.3mL 2021, 2021, 2021       Active Problems:  Patient Active Problem List   Diagnosis Code    Pneumonia J18.9    DKA, type 2, not at goal (HCC) E11.10     Adenomatous polyp of sigmoid colon D12.5    Adenomatous polyp of descending colon D12.4    Adenomatous polyp of colon D12.6    Adenomatous polyp of transverse colon D12.3    PE (pulmonary thromboembolism) (HCC) I26.99    Lung mass R91.8    Pain R52    Pneumonia due to infectious agent J18.9    Palliative care encounter Z51.5    Goals of care, counseling/discussion Z71.89    Advanced care planning/counseling discussion Z71.89    Cancer related pain G89.3    Malignant neoplasm of lower lobe of left lung (HCC) C34.32    Encounter for hospice care discussion Z71.89       Isolation/Infection:   Isolation            Contact          Patient Infection Status       Infection Onset Added Last Indicated Last Indicated By Review Planned Expiration Resolved Resolved By    MRSA 10/22/17 10/25/17 10/25/17 Meagan Esparza RN        MRSA right index finger on 10/22/2017. Please refer to the Discontinuing Isolation policy for guidelines for removal. Electronically signed by Meagan Esparza RN on 10/25/2017 at 8:29 AM    Resolved    COVID-19 10/17/22 10/17/22 10/17/22 COVID-19, Rapid   10/31/22 Infection                        Nurse Assessment:  Last Vital Signs: /67   Pulse 85   Temp 97.5 °F (36.4 °C) (Oral)   Resp 18   Ht 1.778 m (5' 10\")   Wt 111.1 kg (245 lb)   SpO2 100%   BMI 35.15 kg/m²     Last documented pain score (0-10 scale): Pain Level:  (air hunger)  Last Weight:   Wt Readings from Last 1 Encounters:   24 111.1 kg (245 lb)     Mental Status:  {IP PT MENTAL STATUS:23880}    IV Access:  { TRENT IV ACCESS:437019203}    Nursing Mobility/ADLs:  Walking   {CHP DME ADLs:442700423}  Transfer  {CHP DME ADLs:414639263}  Bathing  {CHP DME ADLs:012871311}  Dressing  {CHP DME ADLs:395315912}  Toileting  {CHP DME ADLs:414972012}  Feeding  {CHP DME ADLs:334223861}  Med Admin  {CHP DME ADLs:010087081}  Med Delivery   { TRENT MED Delivery:213012224}    Wound Care Documentation and Therapy:         Elimination:  Continence:   Bowel: {YES / NO:}  Bladder: {YES / NO:}  Urinary Catheter: {Urinary Catheter:453568504}   Colostomy/Ileostomy/Ileal Conduit: {YES / NO:}       Date of Last BM: ***    Intake/Output Summary (Last 24 hours) at 2024 1509  Last data filed at 2024 1441  Gross per 24 hour   Intake 2568.73 ml   Output 1700 ml   Net 868.73 ml     I/O last 3 completed shifts:  In: 3324.9 [P.O.:240; I.V.:1077; IV Piggyback:]  Out: 1716 [Urine:1716]    Safety Concerns:     { TRENT Safety Concerns:972972019}    Impairments/Disabilities:      { TRENT Impairments/Disabilities:744995300}    Nutrition Therapy:  Current Nutrition Therapy:   { TRENT Diet List:452976610}    Routes of Feeding: {[facility] Other Feedings:067173687}  Liquids: {Slp liquid thickness:40301}  Daily Fluid Restriction: {[facility] DME Yes amt example:114614091}  Last Modified Barium Swallow with Video (Video Swallowing Test): {Done Not Done Date:}    Treatments at the Time of Hospital Discharge:   Respiratory Treatments: ***  Oxygen Therapy:  {Therapy; copd oxygen:07477}  Ventilator:    {[facility] Vent List:782342886}    Rehab Therapies: {THERAPEUTIC INTERVENTION:6084586140}  Weight Bearing Status/Restrictions: {[facility] Weight Bearin}  Other Medical Equipment (for information only, NOT a DME order):  {EQUIPMENT:567258617}  Other Treatments: ***    Patient's personal belongings (please select all that are sent with patient):  {[facility] DME Belongings:994426188}    RN SIGNATURE:  {Esignature:980166538}    CASE MANAGEMENT/SOCIAL WORK SECTION    Inpatient Status Date: ***    Readmission Risk Assessment Score:  Readmission Risk              Risk of Unplanned Readmission:  21           Discharging to Facility/ Agency   Name:   Address:  Phone:  Fax:    Dialysis Facility (if applicable)   Name:  Address:  Dialysis Schedule:  Phone:  Fax:    / signature: {Esignature:140654206}    PHYSICIAN

## 2024-08-29 NOTE — PROGRESS NOTES
Spiritual Care Services     Summary of Visit: Patient was spiritually distressed realizing he's near the end of his life and will soon be passing. He needed some reassurance of his Protestant oliverio. Prayed with the patient and he drifted off to sleep.     Encounter Summary  Encounter Overview/Reason: Crisis  Service Provided For: Patient  Referral/Consult From: Clergy/  Support System: Family members  Last Encounter : 08/28/24  Complexity of Encounter: Moderate  Begin Time: 2155  End Time : 2200  Total Time Calculated: 5 min     Crisis  Type: Emotional distress  Spiritual/Emotional needs  Type: Spiritual Support, Spiritual Distress  Rituals, Rites and Sacraments  Type: Blessings  Grief, Loss, and Adjustments  Type: End of Life, Anticipatory Grief  Ethics/Mediation  Type: Other (comment) (Prayer/Spiritual )             Spiritual Assessment/Intervention/Outcomes:    Assessment: Fearful    Intervention: Prayer (assurance of)/[place]    Outcome: Comfort, Deescalated, Encouraged      Care Plan:    Plan and Referrals  Plan/Referrals: No future visits requested  Does the patient have a [facility] PCP?:  (no idea)          Spiritual Care Services   Electronically signed by Chaplain Autumn on 8/28/2024 at 10:15 PM.    To reach a  for emotional and spiritual support, place an EPIC consult request.   If a  is needed immediately, dial “0” and ask to page the on-call .

## 2024-08-29 NOTE — PROGRESS NOTES
Palliative Care Progress Note  Patient: Gerardo Esquivel  Gender: male  YOB: 1955  Unit/Bed: IC06/IC06-01  CodeStatus: DNR-CCA  Inpatient Treatment Team: Treatment Team:   Tone Ramsey MD Zaizafoun, Manaf, MD Sidloski, DO Candy Cao Mary Jo C Dobritch, Emily M, RN Visnyai, Lisa, RN  Admit Date:  8/27/2024    Chief Complaint: Dyspnea    History of Presenting Illness:      Gerardo Esquivel is a 69 y.o. male on hospital day 2 with a history of CAD, CKD, COPD, HTN, HLD, hypothyroidism, DMII, Metastatic Lung CA..    Patient was brought to the emergency room with dyspnea, increased weakness and worsening hemoptysis. Patient was admitted in the setting of Sepsis, pneumonia, lung ca.    Palliative care was consulted by Dr. Ramsey for goals of care.     Upon entering the room I find the patient alert and oriented x3, he is on NC. He is c/o of \"not feeling good\". He denies CP, denies nausea/vomiting. Denies fever/chills. His SOB is ongoing but denies it being currently worse. He reports 9/10 right shoulder and back pain. He reports constipation, last BM 4 days ago. No family at bedside. He is does live at home with long time girlfriend. No HPOA. Reports he would like to make his girlfriend HPOA. Reports increased falls and functional decline since Lung CA dx.     Most recent notable labs:   Sodium 134, potassium 4.9 BUN 72 creatinine 1.6.  GFR is 45.8 albumin 2.9 WBC 45.3 hemoglobin 11.2 hematocrit 35.4    8/29/24:  Patient lethargic, alert and oriented x 1, change of status overnight. Patient currently on NC, increased respirations. Morphine 2mg IV PRN ordered. Family at bedside.       Review of Systems:       Review of Systems   Constitutional:  Positive for activity change, appetite change and fatigue.   HENT:  Negative for trouble swallowing and voice change.    Respiratory:  Positive for cough and shortness of breath.    Gastrointestinal:  Negative for nausea and vomiting.   Musculoskeletal:      Depression     H/O pneumothorax     x 2    Hodgkin disease (HCC)     Hyperlipidemia     Hypertension     Hypothyroid     Lung mass     MRSA infection 2014    Left Groin     Pulmonary embolism (HCC)     Sleep apnea     Thyroid cancer (HCC)     Type II or unspecified type diabetes mellitus without mention of complication, not stated as uncontrolled        PSHx:  Past Surgical History:   Procedure Laterality Date    ABDOMEN SURGERY      hodgkin     BRONCHOSCOPY N/A 2024    EBUS WITH BIOPSYS performed by Cassidy Hu MD at [facility] OR    CATARACT REMOVAL Bilateral     CHOLECYSTECTOMY      COLONOSCOPY      COLONOSCOPY N/A 2016    COLONOSCOPY performed by Saundra Blount MD at [facility]    COLONOSCOPY N/A 2021    COLORECTAL CANCER SCREENING, HIGH RISK WITH POLYPECTOMY performed by Saundra Blount MD at [facility]    ENDOSCOPY, COLON, DIAGNOSTIC      HERNIA REPAIR      umbilical    SKIN BIOPSY      SPLENECTOMY, TOTAL      THORACOSCOPY W/ TALC PLEURODESIS Right     x 2    TOOTH EXTRACTION      TOTAL THYROIDECTOMY      CANCER       Social Hx:  Social History     Socioeconomic History    Marital status:      Spouse name: None    Number of children: None    Years of education: None    Highest education level: None   Occupational History     Comment: exposures to diesel fuels   Tobacco Use    Smoking status: Former     Current packs/day: 0.00     Average packs/day: 2.0 packs/day for 35.0 years (70.0 ttl pk-yrs)     Types: Cigarettes     Start date:      Quit date:      Years since quittin.6    Smokeless tobacco: Never   Vaping Use    Vaping status: Never Used   Substance and Sexual Activity    Alcohol use: No    Drug use: No    Sexual activity: Yes     Partners: Female     Social Determinants of Health     Food Insecurity: No Food Insecurity (2024)    Hunger Vital Sign     Worried About Running Out of Food in the Last Year: Never true     Ran Out of Food in the  outpatient setting.     Hospice: hospice eligible in the setting of advanced lung ca however not currently in goals of care     Advance Care Planning: Discussed goals of care with patient. Explained in extensive detail nuances between full code, DNR CCA and DNR CC. Patient has made the decision to be FULL CODE.  Consult Spiritual care. Patient has girlfriend of 10 years. Wishes to elect her at [facility].   Attempted to reach Krys Almeida at [phone], no answer left voicemail.     Goals of Care Discussion: Disease process and goals of treatment were discussed in basic terms. Gerardo's goal is to optimize available comfort care measures. We discussed the palliative care philosophy in light of those goals. We discussed all care options contingent on treatment response and QOL. Much active listening, presence, and emotional support were given.    I have discussed/reviewed the patient's case with nursing.      8/29/24:  Change of status, increased respirations, and shortness of breath  Overall functional decline, overall poor prognosis.   Code status/goals of care discussion held.     Discussed [place] Hierarchy of Surrogate Decision Makers (In ABSENCE of an appointed healthcare agent in Advance Directive / Healthcare Power of ) [place] Code 2133.08   Legal, Court-Appointed Guardian for patient   2. Spouse of patient (except where there is pending divorce, dissolution, legal separation, or annulment proceeding has been filed)   3. Adult child of the patient (if more than one, majority of the reasonably available children)   4. Parent of the patient   5. Adult sibling of the patient (if more than one, majority of the reasonably available siblings)      Patient is not legally . He has 3 living siblings.   Spoke to girlfriend, brother via phone and sister at bedside.   Brother, Live and Sister Dinah wish to have no intubation, no CPR and no shock. Wish to focus on comfort. Discussed hospice approach. Agreeable to

## 2024-08-30 NOTE — PROGRESS NOTES
1900: Bedside report obtained from Meredith REYES, after which this RN assumed care of pt.     2000: Shift assessments completed and documented, see flowsheets. A&OX1, breathing labored with accessory muscle use. Family at bedside. Medications refused by family per MAR r/t pt going to hospice tonight, awaiting transport, and only wanting comfort measures at this time. Bed alarm on. Call light within reach. No further needs verbalized by family or pt at this time.    2110: Ambulance transport here for pt to go to hospice. Pt off floor with family and belongings at this time after handoff report given to EMS staff. Charge RN notified.

## 2024-09-01 LAB — BACTERIA BLD CULT: NORMAL

## 2024-09-04 LAB — BACTERIA BLD CULT: NORMAL

## 2024-09-21 LAB
AFB STAIN: NORMAL
FINAL REPORT: NORMAL
PRELIMINARY: NORMAL

## 2024-11-25 ENCOUNTER — APPOINTMENT (OUTPATIENT)
Dept: CARDIOLOGY | Facility: CLINIC | Age: 69
End: 2024-11-25
Payer: MEDICARE

## (undated) DEVICE — GLOVE ORANGE PI 7 1/2   MSG9075

## (undated) DEVICE — SYRINGE MED 30ML SLIP TIP BLNT FILL AND LUERLOCK DISP

## (undated) DEVICE — FORCEPS BX L240CM JAW DIA2.8MM L CAP W/ NDL MIC MESH TOOTH

## (undated) DEVICE — FORCEPS BX L100CM DIA1.8MM WRK CHN 2MM PULM S STL RAD JAW 4

## (undated) DEVICE — SINGLE USE SUCTION VALVE MAJ-209: Brand: SINGLE USE SUCTION VALVE (STERILE)

## (undated) DEVICE — ENDO CARRY-ON PROCEDURE KIT: Brand: ENDO CARRY-ON PROCEDURE KIT

## (undated) DEVICE — TOWEL,OR,DSP,ST,BLUE,STD,4/PK,20PK/CS: Brand: MEDLINE

## (undated) DEVICE — STOPCOCK 3-WAY 45 PSI LOW OFF ROT COLAR

## (undated) DEVICE — GLOVE ORTHO 8   MSG9480

## (undated) DEVICE — SINGLE PORT MANIFOLD: Brand: NEPTUNE 2

## (undated) DEVICE — DRESSING,GAUZE,PETROLATUM,CURAD,1"X8",ST: Brand: CURAD

## (undated) DEVICE — SHEET,DRAPE,53X77,STERILE: Brand: MEDLINE

## (undated) DEVICE — ENDOSCOPIC TRAY TRNSPRT 20.5X16.5X4.1 IN RECYCL SUGAR PULP

## (undated) DEVICE — SNARE ENDOSCP AD L240CM LOOP W10MM SHTH DIA2.4MM RND INSUL

## (undated) DEVICE — NEEDLE ASPIR 19GA HISTOLOGY FLX VIZISHOT 2

## (undated) DEVICE — CONTAINER,SPECIMEN,OR STERILE,4OZ: Brand: MEDLINE

## (undated) DEVICE — HYPODERMIC SAFETY NEEDLE: Brand: MAGELLAN

## (undated) DEVICE — BITE BLOCK ENDOSCP AD 60 FR W/ ADJ STRP PLAS GRN BLOX

## (undated) DEVICE — TRAP POLYP BALEEN

## (undated) DEVICE — TUBING, SUCTION, 9/32" X 12', STRAIGHT: Brand: MEDLINE INDUSTRIES, INC.

## (undated) DEVICE — SYRINGE MED 10ML SLIP TIP BLNT FILL AND LUERLOCK DISP

## (undated) DEVICE — LABEL MED MINI W/ MARKER

## (undated) DEVICE — SYRINGE MEDICAL 3ML CLEAR PLASTIC STANDARD NON CONTROL LUERLOCK TIP DISPOSABLE

## (undated) DEVICE — SINGLE USE BIOPSY VALVE MAJ-210: Brand: SINGLE USE BIOPSY VALVE (STERILE)

## (undated) DEVICE — Device: Brand: ENDO SMARTCAP

## (undated) DEVICE — ADAPTER TBNG DIA15MM SWVL FBROPT BRONCHSCP TERM 2 AXIS PEEP

## (undated) DEVICE — SYRINGE, LUER SLIP, STERILE, 5ML: Brand: MEDLINE INDUSTRIES, INC.

## (undated) DEVICE — TUBE SET 96 MM 64 MM H2O PERISTALTIC STD AUX CHANNEL

## (undated) DEVICE — COVER,TABLE,44X90,STERILE: Brand: MEDLINE

## (undated) DEVICE — TUBING, SUCTION, 1/4" X 10', STRAIGHT: Brand: MEDLINE

## (undated) DEVICE — BRUSH ENDO CLN L90.5IN SHTH DIA1.7MM BRIST DIA5-7MM 2-6MM

## (undated) DEVICE — GOWN,AURORA,NONREINFORCED,LARGE: Brand: MEDLINE

## (undated) DEVICE — SPONGE GZ W4XL4IN RAYON POLY CVR W/NONWOVEN FAB STRL 2/PK

## (undated) DEVICE — MAJ-1414 SINGLE USE ADPATER BIOPSY VALV: Brand: SINGLE USE ADAPTOR BIOPSY VALVE